# Patient Record
Sex: MALE | Race: WHITE | Employment: OTHER | URBAN - METROPOLITAN AREA
[De-identification: names, ages, dates, MRNs, and addresses within clinical notes are randomized per-mention and may not be internally consistent; named-entity substitution may affect disease eponyms.]

---

## 2023-12-08 ENCOUNTER — HOSPITAL ENCOUNTER (INPATIENT)
Facility: HOSPITAL | Age: 73
LOS: 2 days | Discharge: HOME | DRG: 194 | End: 2023-12-10
Attending: EMERGENCY MEDICINE | Admitting: INTERNAL MEDICINE
Payer: MEDICARE

## 2023-12-08 ENCOUNTER — OFFICE VISIT (OUTPATIENT)
Dept: URGENT CARE | Facility: CLINIC | Age: 73
End: 2023-12-08
Payer: MEDICARE

## 2023-12-08 ENCOUNTER — APPOINTMENT (EMERGENCY)
Dept: RADIOLOGY | Facility: HOSPITAL | Age: 73
DRG: 194 | End: 2023-12-08
Payer: MEDICARE

## 2023-12-08 VITALS
HEART RATE: 85 BPM | RESPIRATION RATE: 18 BRPM | OXYGEN SATURATION: 97 % | DIASTOLIC BLOOD PRESSURE: 59 MMHG | TEMPERATURE: 96.7 F | SYSTOLIC BLOOD PRESSURE: 80 MMHG

## 2023-12-08 DIAGNOSIS — R55 SYNCOPE: Primary | ICD-10-CM

## 2023-12-08 DIAGNOSIS — R68.89 FLU-LIKE SYMPTOMS: Primary | ICD-10-CM

## 2023-12-08 DIAGNOSIS — A41.9 SEPTIC SHOCK (HCC): ICD-10-CM

## 2023-12-08 DIAGNOSIS — E86.0 DEHYDRATION: ICD-10-CM

## 2023-12-08 DIAGNOSIS — R65.21 SEPTIC SHOCK (HCC): ICD-10-CM

## 2023-12-08 DIAGNOSIS — N17.9 AKI (ACUTE KIDNEY INJURY) (HCC): ICD-10-CM

## 2023-12-08 DIAGNOSIS — J10.1 INFLUENZA A: ICD-10-CM

## 2023-12-08 PROBLEM — J11.1 FLU: Status: ACTIVE | Noted: 2023-12-08

## 2023-12-08 LAB
2HR DELTA HS TROPONIN: -1 NG/L
ALBUMIN SERPL BCP-MCNC: 3.8 G/DL (ref 3.5–5)
ALP SERPL-CCNC: 67 U/L (ref 34–104)
ALT SERPL W P-5'-P-CCNC: 50 U/L (ref 7–52)
ANION GAP SERPL CALCULATED.3IONS-SCNC: 11 MMOL/L
AST SERPL W P-5'-P-CCNC: 85 U/L (ref 13–39)
BASOPHILS # BLD AUTO: 0.01 THOUSANDS/ÂΜL (ref 0–0.1)
BASOPHILS NFR BLD AUTO: 0 % (ref 0–1)
BILIRUB SERPL-MCNC: 0.59 MG/DL (ref 0.2–1)
BUN SERPL-MCNC: 57 MG/DL (ref 5–25)
CALCIUM SERPL-MCNC: 8.6 MG/DL (ref 8.4–10.2)
CARDIAC TROPONIN I PNL SERPL HS: 11 NG/L
CARDIAC TROPONIN I PNL SERPL HS: 12 NG/L
CHLORIDE SERPL-SCNC: 103 MMOL/L (ref 96–108)
CO2 SERPL-SCNC: 23 MMOL/L (ref 21–32)
CREAT SERPL-MCNC: 2.39 MG/DL (ref 0.6–1.3)
EOSINOPHIL # BLD AUTO: 0.02 THOUSAND/ÂΜL (ref 0–0.61)
EOSINOPHIL NFR BLD AUTO: 0 % (ref 0–6)
ERYTHROCYTE [DISTWIDTH] IN BLOOD BY AUTOMATED COUNT: 13.3 % (ref 11.6–15.1)
FLUAV RNA RESP QL NAA+PROBE: POSITIVE
FLUBV RNA RESP QL NAA+PROBE: NEGATIVE
GFR SERPL CREATININE-BSD FRML MDRD: 26 ML/MIN/1.73SQ M
GLUCOSE SERPL-MCNC: 53 MG/DL (ref 65–140)
GLUCOSE SERPL-MCNC: 69 MG/DL (ref 65–140)
HCT VFR BLD AUTO: 41.2 % (ref 36.5–49.3)
HGB BLD-MCNC: 13.7 G/DL (ref 12–17)
IMM GRANULOCYTES # BLD AUTO: 0.01 THOUSAND/UL (ref 0–0.2)
IMM GRANULOCYTES NFR BLD AUTO: 0 % (ref 0–2)
LYMPHOCYTES # BLD AUTO: 1.1 THOUSANDS/ÂΜL (ref 0.6–4.47)
LYMPHOCYTES NFR BLD AUTO: 18 % (ref 14–44)
MAGNESIUM SERPL-MCNC: 2 MG/DL (ref 1.9–2.7)
MCH RBC QN AUTO: 29.5 PG (ref 26.8–34.3)
MCHC RBC AUTO-ENTMCNC: 33.3 G/DL (ref 31.4–37.4)
MCV RBC AUTO: 89 FL (ref 82–98)
MONOCYTES # BLD AUTO: 0.69 THOUSAND/ÂΜL (ref 0.17–1.22)
MONOCYTES NFR BLD AUTO: 11 % (ref 4–12)
NEUTROPHILS # BLD AUTO: 4.21 THOUSANDS/ÂΜL (ref 1.85–7.62)
NEUTS SEG NFR BLD AUTO: 71 % (ref 43–75)
NRBC BLD AUTO-RTO: 0 /100 WBCS
PLATELET # BLD AUTO: 176 THOUSANDS/UL (ref 149–390)
PMV BLD AUTO: 10 FL (ref 8.9–12.7)
POTASSIUM SERPL-SCNC: 3.4 MMOL/L (ref 3.5–5.3)
PROT SERPL-MCNC: 7.4 G/DL (ref 6.4–8.4)
RBC # BLD AUTO: 4.64 MILLION/UL (ref 3.88–5.62)
RSV RNA RESP QL NAA+PROBE: NEGATIVE
SARS-COV-2 AG UPPER RESP QL IA: NEGATIVE
SARS-COV-2 RNA RESP QL NAA+PROBE: NEGATIVE
SL AMB POCT GLUCOSE BLD: 109
SODIUM SERPL-SCNC: 137 MMOL/L (ref 135–147)
VALID CONTROL: NORMAL
WBC # BLD AUTO: 6.04 THOUSAND/UL (ref 4.31–10.16)

## 2023-12-08 PROCEDURE — 87811 SARS-COV-2 COVID19 W/OPTIC: CPT | Performed by: FAMILY MEDICINE

## 2023-12-08 PROCEDURE — 71045 X-RAY EXAM CHEST 1 VIEW: CPT

## 2023-12-08 PROCEDURE — G1004 CDSM NDSC: HCPCS

## 2023-12-08 PROCEDURE — 36415 COLL VENOUS BLD VENIPUNCTURE: CPT | Performed by: EMERGENCY MEDICINE

## 2023-12-08 PROCEDURE — 85025 COMPLETE CBC W/AUTO DIFF WBC: CPT | Performed by: EMERGENCY MEDICINE

## 2023-12-08 PROCEDURE — 87636 SARSCOV2 & INF A&B AMP PRB: CPT | Performed by: FAMILY MEDICINE

## 2023-12-08 PROCEDURE — 70450 CT HEAD/BRAIN W/O DYE: CPT

## 2023-12-08 PROCEDURE — 96360 HYDRATION IV INFUSION INIT: CPT

## 2023-12-08 PROCEDURE — 99285 EMERGENCY DEPT VISIT HI MDM: CPT | Performed by: EMERGENCY MEDICINE

## 2023-12-08 PROCEDURE — 84484 ASSAY OF TROPONIN QUANT: CPT | Performed by: EMERGENCY MEDICINE

## 2023-12-08 PROCEDURE — 99222 1ST HOSP IP/OBS MODERATE 55: CPT | Performed by: INTERNAL MEDICINE

## 2023-12-08 PROCEDURE — 93005 ELECTROCARDIOGRAM TRACING: CPT

## 2023-12-08 PROCEDURE — 80053 COMPREHEN METABOLIC PANEL: CPT | Performed by: EMERGENCY MEDICINE

## 2023-12-08 PROCEDURE — 82948 REAGENT STRIP/BLOOD GLUCOSE: CPT

## 2023-12-08 PROCEDURE — 83735 ASSAY OF MAGNESIUM: CPT | Performed by: EMERGENCY MEDICINE

## 2023-12-08 PROCEDURE — 0241U HB NFCT DS VIR RESP RNA 4 TRGT: CPT | Performed by: EMERGENCY MEDICINE

## 2023-12-08 PROCEDURE — 99285 EMERGENCY DEPT VISIT HI MDM: CPT

## 2023-12-08 PROCEDURE — 99213 OFFICE O/P EST LOW 20 MIN: CPT | Performed by: FAMILY MEDICINE

## 2023-12-08 PROCEDURE — 82948 REAGENT STRIP/BLOOD GLUCOSE: CPT | Performed by: FAMILY MEDICINE

## 2023-12-08 RX ORDER — ONDANSETRON 2 MG/ML
4 INJECTION INTRAMUSCULAR; INTRAVENOUS ONCE
Status: COMPLETED | OUTPATIENT
Start: 2023-12-08 | End: 2023-12-08

## 2023-12-08 RX ORDER — ACETAMINOPHEN 325 MG/1
650 TABLET ORAL EVERY 6 HOURS PRN
Status: DISCONTINUED | OUTPATIENT
Start: 2023-12-08 | End: 2023-12-10 | Stop reason: HOSPADM

## 2023-12-08 RX ORDER — OSELTAMIVIR PHOSPHATE 6 MG/ML
75 FOR SUSPENSION ORAL EVERY 12 HOURS SCHEDULED
Status: DISCONTINUED | OUTPATIENT
Start: 2023-12-08 | End: 2023-12-08

## 2023-12-08 RX ORDER — HEPARIN SODIUM 5000 [USP'U]/ML
5000 INJECTION, SOLUTION INTRAVENOUS; SUBCUTANEOUS EVERY 8 HOURS SCHEDULED
Status: DISCONTINUED | OUTPATIENT
Start: 2023-12-08 | End: 2023-12-10 | Stop reason: HOSPADM

## 2023-12-08 RX ORDER — OSELTAMIVIR PHOSPHATE 30 MG/1
30 CAPSULE ORAL EVERY 12 HOURS SCHEDULED
Status: DISCONTINUED | OUTPATIENT
Start: 2023-12-08 | End: 2023-12-10 | Stop reason: HOSPADM

## 2023-12-08 RX ORDER — SODIUM CHLORIDE 9 MG/ML
100 INJECTION, SOLUTION INTRAVENOUS CONTINUOUS
Status: DISCONTINUED | OUTPATIENT
Start: 2023-12-08 | End: 2023-12-09

## 2023-12-08 RX ORDER — MAGNESIUM HYDROXIDE/ALUMINUM HYDROXICE/SIMETHICONE 120; 1200; 1200 MG/30ML; MG/30ML; MG/30ML
30 SUSPENSION ORAL EVERY 6 HOURS PRN
Status: DISCONTINUED | OUTPATIENT
Start: 2023-12-08 | End: 2023-12-10 | Stop reason: HOSPADM

## 2023-12-08 RX ORDER — ONDANSETRON 2 MG/ML
4 INJECTION INTRAMUSCULAR; INTRAVENOUS EVERY 6 HOURS PRN
Status: DISCONTINUED | OUTPATIENT
Start: 2023-12-08 | End: 2023-12-10 | Stop reason: HOSPADM

## 2023-12-08 RX ADMIN — ONDANSETRON 4 MG: 2 INJECTION INTRAMUSCULAR; INTRAVENOUS at 20:45

## 2023-12-08 RX ADMIN — OSELTAMIVIR PHOSPHATE 30 MG: 30 CAPSULE ORAL at 21:56

## 2023-12-08 RX ADMIN — SODIUM CHLORIDE 75 ML/HR: 0.9 INJECTION, SOLUTION INTRAVENOUS at 23:49

## 2023-12-08 RX ADMIN — HEPARIN SODIUM 5000 UNITS: 5000 INJECTION INTRAVENOUS; SUBCUTANEOUS at 23:48

## 2023-12-08 RX ADMIN — SODIUM CHLORIDE 1000 ML: 0.9 INJECTION, SOLUTION INTRAVENOUS at 18:38

## 2023-12-08 NOTE — PROGRESS NOTES
[facility]        NAME: Cony Lopez is a 68 y.o. male  : 1950    MRN: [id number]  DATE: 2023  TIME: 6:47 PM    Assessment and Plan   Flu-like symptoms [R68.89]  1. Flu-like symptoms  Covid/Flu-Office Collect    Poct Covid 19 Rapid Antigen Test      2. Septic shock (HCC)  POCT blood glucose    Transfer to other facility        Likely experiencing pneumonia versus viral infection such as COVID or influenza which is currently affecting his mentation; very fatigued with lapsing consciousness. Had a syncopal episode in the office during his evaluation. As such, will transfer to the ED via EMS for further evaluation and management. Blood sugar 109 in the setting of DM2. BP check prior to transfer was 80/59. Patient Instructions     Follow up with PCP in 3-5 days. Proceed to  ER if symptoms worsen. Chief Complaint     Chief Complaint   Patient presents with    Cold Like Symptoms     Patient reports flu like symptoms that began Monday. Has been having fevers with severe fatigue and body aches. Also reports some nausea with diarrhea. History of Present Illness     60-year-old male with DM2 presents today with about 4 days of flulike symptoms including fatigue, chills, generalized myalgias, dizziness, headaches, nausea and a dry cough. Denies any nasal symptoms, chest pain, dyspnea or abdominal pain. His wife was diagnosed with acute bronchitis prior to the onset of his symptoms. Review of Systems   Review of Systems   Constitutional:  Positive for chills and fatigue. Negative for fever. HENT:  Negative for congestion and rhinorrhea. Respiratory:  Positive for cough (dry). Negative for shortness of breath. Cardiovascular:  Negative for chest pain. Gastrointestinal:  Positive for nausea. Negative for abdominal pain. Musculoskeletal:  Positive for myalgias. Neurological:  Positive for dizziness and headaches.      Current Medications     No current facility-administered medications for this visit. No current outpatient medications on file. Facility-Administered Medications Ordered in Other Visits:     sodium chloride 0.9 % bolus 1,000 mL, 1,000 mL, Intravenous, Once, Zeina Brar DO, Last Rate: 1,000 mL/hr at 12/08/23 1838, 1,000 mL at 12/08/23 0977    Current Allergies     Allergies as of 12/08/2023    (No Known Allergies)            The following portions of the patient's history were reviewed and updated as appropriate: allergies, current medications, past family history, past medical history, past social history, past surgical history and problem list.     Past Medical History:   Diagnosis Date    Diabetes mellitus ([address])     Hypertension        History reviewed. No pertinent surgical history. History reviewed. No pertinent family history. Medications have been verified. Objective   BP (!) 80/59   Pulse 85   Temp (!) 96.7 °F (35.9 °C)   Resp 18   SpO2 97%   No LMP for male patient. Physical Exam     Physical Exam  Vitals and nursing note reviewed. Constitutional:       General: He is in acute distress. Appearance: He is obese. He is ill-appearing. He is not diaphoretic. Comments: Very lethargic   HENT:      Head: Normocephalic and atraumatic. Eyes:      General:         Right eye: No discharge. Left eye: No discharge. Conjunctiva/sclera: Conjunctivae normal.   Cardiovascular:      Rate and Rhythm: Normal rate and regular rhythm. Pulmonary:      Effort: No respiratory distress. Breath sounds: Normal breath sounds. No wheezing, rhonchi or rales. Skin:     General: Skin is warm.

## 2023-12-09 PROBLEM — J32.9 SINUSITIS: Status: ACTIVE | Noted: 2023-12-09

## 2023-12-09 PROBLEM — I10 PRIMARY HYPERTENSION: Status: ACTIVE | Noted: 2023-12-09

## 2023-12-09 PROBLEM — E11.9 DM (DIABETES MELLITUS) (HCC): Status: ACTIVE | Noted: 2023-12-09

## 2023-12-09 LAB
ALBUMIN SERPL BCP-MCNC: 3.2 G/DL (ref 3.5–5)
ALP SERPL-CCNC: 59 U/L (ref 34–104)
ALT SERPL W P-5'-P-CCNC: 37 U/L (ref 7–52)
ANION GAP SERPL CALCULATED.3IONS-SCNC: 8 MMOL/L
APTT PPP: 37 SECONDS (ref 23–37)
AST SERPL W P-5'-P-CCNC: 60 U/L (ref 13–39)
ATRIAL RATE: 81 BPM
BACTERIA UR QL AUTO: NORMAL /HPF
BASOPHILS # BLD AUTO: 0 THOUSANDS/ÂΜL (ref 0–0.1)
BASOPHILS NFR BLD AUTO: 0 % (ref 0–1)
BILIRUB SERPL-MCNC: 0.62 MG/DL (ref 0.2–1)
BILIRUB UR QL STRIP: NEGATIVE
BUN SERPL-MCNC: 51 MG/DL (ref 5–25)
CALCIUM ALBUM COR SERPL-MCNC: 8.3 MG/DL (ref 8.3–10.1)
CALCIUM SERPL-MCNC: 7.7 MG/DL (ref 8.4–10.2)
CHLORIDE SERPL-SCNC: 104 MMOL/L (ref 96–108)
CLARITY UR: CLEAR
CO2 SERPL-SCNC: 21 MMOL/L (ref 21–32)
COLOR UR: YELLOW
CREAT SERPL-MCNC: 1.78 MG/DL (ref 0.6–1.3)
EOSINOPHIL # BLD AUTO: 0.01 THOUSAND/ÂΜL (ref 0–0.61)
EOSINOPHIL NFR BLD AUTO: 0 % (ref 0–6)
ERYTHROCYTE [DISTWIDTH] IN BLOOD BY AUTOMATED COUNT: 13.2 % (ref 11.6–15.1)
FINE GRAN CASTS URNS QL MICRO: NORMAL /LPF
FLUAV RNA RESP QL NAA+PROBE: POSITIVE
FLUBV RNA RESP QL NAA+PROBE: NEGATIVE
GFR SERPL CREATININE-BSD FRML MDRD: 37 ML/MIN/1.73SQ M
GLUCOSE SERPL-MCNC: 143 MG/DL (ref 65–140)
GLUCOSE SERPL-MCNC: 146 MG/DL (ref 65–140)
GLUCOSE SERPL-MCNC: 212 MG/DL (ref 65–140)
GLUCOSE SERPL-MCNC: 218 MG/DL (ref 65–140)
GLUCOSE UR STRIP-MCNC: NEGATIVE MG/DL
HCT VFR BLD AUTO: 34.2 % (ref 36.5–49.3)
HGB BLD-MCNC: 11.6 G/DL (ref 12–17)
HGB UR QL STRIP.AUTO: NEGATIVE
IMM GRANULOCYTES # BLD AUTO: 0.01 THOUSAND/UL (ref 0–0.2)
IMM GRANULOCYTES NFR BLD AUTO: 0 % (ref 0–2)
INR PPP: 1.06 (ref 0.84–1.19)
KETONES UR STRIP-MCNC: NEGATIVE MG/DL
LEUKOCYTE ESTERASE UR QL STRIP: NEGATIVE
LIPASE SERPL-CCNC: 35 U/L (ref 11–82)
LYMPHOCYTES # BLD AUTO: 1.28 THOUSANDS/ÂΜL (ref 0.6–4.47)
LYMPHOCYTES NFR BLD AUTO: 24 % (ref 14–44)
MAGNESIUM SERPL-MCNC: 1.8 MG/DL (ref 1.9–2.7)
MCH RBC QN AUTO: 29.4 PG (ref 26.8–34.3)
MCHC RBC AUTO-ENTMCNC: 33.9 G/DL (ref 31.4–37.4)
MCV RBC AUTO: 87 FL (ref 82–98)
MONOCYTES # BLD AUTO: 0.57 THOUSAND/ÂΜL (ref 0.17–1.22)
MONOCYTES NFR BLD AUTO: 11 % (ref 4–12)
NEUTROPHILS # BLD AUTO: 3.48 THOUSANDS/ÂΜL (ref 1.85–7.62)
NEUTS SEG NFR BLD AUTO: 65 % (ref 43–75)
NITRITE UR QL STRIP: NEGATIVE
NON-SQ EPI CELLS URNS QL MICRO: NORMAL /HPF
NRBC BLD AUTO-RTO: 0 /100 WBCS
P AXIS: -5 DEGREES
PH UR STRIP.AUTO: 5 [PH]
PHOSPHATE SERPL-MCNC: 3 MG/DL (ref 2.3–4.1)
PLATELET # BLD AUTO: 152 THOUSANDS/UL (ref 149–390)
PMV BLD AUTO: 9.4 FL (ref 8.9–12.7)
POTASSIUM SERPL-SCNC: 3.7 MMOL/L (ref 3.5–5.3)
PR INTERVAL: 168 MS
PROT SERPL-MCNC: 5.9 G/DL (ref 6.4–8.4)
PROT UR STRIP-MCNC: ABNORMAL MG/DL
PROTHROMBIN TIME: 14.1 SECONDS (ref 11.6–14.5)
QRS AXIS: -56 DEGREES
QRSD INTERVAL: 88 MS
QT INTERVAL: 360 MS
QTC INTERVAL: 418 MS
RBC # BLD AUTO: 3.94 MILLION/UL (ref 3.88–5.62)
RBC #/AREA URNS AUTO: NORMAL /HPF
SARS-COV-2 RNA RESP QL NAA+PROBE: NEGATIVE
SODIUM SERPL-SCNC: 133 MMOL/L (ref 135–147)
SP GR UR STRIP.AUTO: 1.02 (ref 1–1.03)
T WAVE AXIS: 11 DEGREES
UROBILINOGEN UR QL STRIP.AUTO: 0.2 E.U./DL
VENTRICULAR RATE: 81 BPM
WBC # BLD AUTO: 5.35 THOUSAND/UL (ref 4.31–10.16)
WBC #/AREA URNS AUTO: NORMAL /HPF

## 2023-12-09 PROCEDURE — 81001 URINALYSIS AUTO W/SCOPE: CPT | Performed by: INTERNAL MEDICINE

## 2023-12-09 PROCEDURE — NC001 PR NO CHARGE: Performed by: INTERNAL MEDICINE

## 2023-12-09 PROCEDURE — 83735 ASSAY OF MAGNESIUM: CPT | Performed by: INTERNAL MEDICINE

## 2023-12-09 PROCEDURE — 83690 ASSAY OF LIPASE: CPT | Performed by: INTERNAL MEDICINE

## 2023-12-09 PROCEDURE — 99223 1ST HOSP IP/OBS HIGH 75: CPT | Performed by: INTERNAL MEDICINE

## 2023-12-09 PROCEDURE — 85610 PROTHROMBIN TIME: CPT | Performed by: INTERNAL MEDICINE

## 2023-12-09 PROCEDURE — 85025 COMPLETE CBC W/AUTO DIFF WBC: CPT | Performed by: INTERNAL MEDICINE

## 2023-12-09 PROCEDURE — 82948 REAGENT STRIP/BLOOD GLUCOSE: CPT

## 2023-12-09 PROCEDURE — 85730 THROMBOPLASTIN TIME PARTIAL: CPT | Performed by: INTERNAL MEDICINE

## 2023-12-09 PROCEDURE — 99232 SBSQ HOSP IP/OBS MODERATE 35: CPT

## 2023-12-09 PROCEDURE — 80053 COMPREHEN METABOLIC PANEL: CPT | Performed by: INTERNAL MEDICINE

## 2023-12-09 PROCEDURE — 84100 ASSAY OF PHOSPHORUS: CPT | Performed by: INTERNAL MEDICINE

## 2023-12-09 RX ORDER — POTASSIUM CHLORIDE 20 MEQ/1
20 TABLET, EXTENDED RELEASE ORAL ONCE
Status: COMPLETED | OUTPATIENT
Start: 2023-12-09 | End: 2023-12-09

## 2023-12-09 RX ORDER — INSULIN LISPRO 100 [IU]/ML
1-5 INJECTION, SOLUTION INTRAVENOUS; SUBCUTANEOUS
Status: DISCONTINUED | OUTPATIENT
Start: 2023-12-09 | End: 2023-12-10 | Stop reason: HOSPADM

## 2023-12-09 RX ORDER — INSULIN LISPRO 100 [IU]/ML
1-6 INJECTION, SOLUTION INTRAVENOUS; SUBCUTANEOUS
Status: DISCONTINUED | OUTPATIENT
Start: 2023-12-09 | End: 2023-12-10 | Stop reason: HOSPADM

## 2023-12-09 RX ORDER — LANOLIN ALCOHOL/MO/W.PET/CERES
400 CREAM (GRAM) TOPICAL 2 TIMES DAILY
Status: DISCONTINUED | OUTPATIENT
Start: 2023-12-09 | End: 2023-12-10 | Stop reason: HOSPADM

## 2023-12-09 RX ORDER — GUAIFENESIN 600 MG/1
600 TABLET, EXTENDED RELEASE ORAL EVERY 12 HOURS SCHEDULED
Status: DISCONTINUED | OUTPATIENT
Start: 2023-12-09 | End: 2023-12-10 | Stop reason: HOSPADM

## 2023-12-09 RX ORDER — SODIUM CHLORIDE, SODIUM GLUCONATE, SODIUM ACETATE, POTASSIUM CHLORIDE, MAGNESIUM CHLORIDE, SODIUM PHOSPHATE, DIBASIC, AND POTASSIUM PHOSPHATE .53; .5; .37; .037; .03; .012; .00082 G/100ML; G/100ML; G/100ML; G/100ML; G/100ML; G/100ML; G/100ML
100 INJECTION, SOLUTION INTRAVENOUS CONTINUOUS
Status: DISCONTINUED | OUTPATIENT
Start: 2023-12-09 | End: 2023-12-10

## 2023-12-09 RX ADMIN — OSELTAMIVIR PHOSPHATE 30 MG: 30 CAPSULE ORAL at 09:04

## 2023-12-09 RX ADMIN — HEPARIN SODIUM 5000 UNITS: 5000 INJECTION INTRAVENOUS; SUBCUTANEOUS at 14:10

## 2023-12-09 RX ADMIN — POTASSIUM CHLORIDE 20 MEQ: 1500 TABLET, EXTENDED RELEASE ORAL at 06:45

## 2023-12-09 RX ADMIN — GUAIFENESIN 600 MG: 600 TABLET, EXTENDED RELEASE ORAL at 11:02

## 2023-12-09 RX ADMIN — INSULIN LISPRO 2 UNITS: 100 INJECTION, SOLUTION INTRAVENOUS; SUBCUTANEOUS at 17:28

## 2023-12-09 RX ADMIN — Medication 400 MG: at 17:34

## 2023-12-09 RX ADMIN — INSULIN LISPRO 2 UNITS: 100 INJECTION, SOLUTION INTRAVENOUS; SUBCUTANEOUS at 22:32

## 2023-12-09 RX ADMIN — HEPARIN SODIUM 5000 UNITS: 5000 INJECTION INTRAVENOUS; SUBCUTANEOUS at 22:32

## 2023-12-09 RX ADMIN — HEPARIN SODIUM 5000 UNITS: 5000 INJECTION INTRAVENOUS; SUBCUTANEOUS at 06:45

## 2023-12-09 RX ADMIN — GUAIFENESIN 600 MG: 600 TABLET, EXTENDED RELEASE ORAL at 20:26

## 2023-12-09 RX ADMIN — OSELTAMIVIR PHOSPHATE 30 MG: 30 CAPSULE ORAL at 20:26

## 2023-12-09 RX ADMIN — Medication 400 MG: at 09:45

## 2023-12-09 RX ADMIN — SODIUM CHLORIDE, SODIUM GLUCONATE, SODIUM ACETATE, POTASSIUM CHLORIDE, MAGNESIUM CHLORIDE, SODIUM PHOSPHATE, DIBASIC, AND POTASSIUM PHOSPHATE 100 ML/HR: .53; .5; .37; .037; .03; .012; .00082 INJECTION, SOLUTION INTRAVENOUS at 09:38

## 2023-12-09 NOTE — ED PROVIDER NOTES
History  Chief Complaint   Patient presents with    Syncope     Pt. Was at [facility]   to get checked for covid or flu he states been sick since Monday with chills, body aches and diarrhea. Pt. Was about to get up to go to bathroom and began feeling lightheaded and dizzy  fell backwards  on to buttocks and then said slid on wall and blacked out. Flu Symptoms     31-year-old male with past history of diabetes, hypertension, presents to the ED from a local urgent care after a syncopal episode. Patient states that he has had increasing weakness, diarrhea and intermittent nausea/vomiting over the past 5 days. Patient felt very dehydrated and weak. Patient went to a local urgent care to be tested for influenza and COVID. While waiting in the urgent care waiting room, patient started to get dizzy and when he went to get up he felt lightheaded and fell on his buttocks and then lowered himself to the floor. Patient denies hitting his head. Patient is extremely weak appearing upon arrival to the emergency department via EMS. Patient states that he has not been able to eat anything over the past 5 days and feels like he is dehydrated. History provided by:  EMS personnel and patient  Syncope  Associated symptoms: fever, nausea and vomiting    Associated symptoms: no chest pain, no palpitations, no seizures and no shortness of breath    Flu Symptoms  Presenting symptoms: diarrhea, fatigue, fever, nausea and vomiting    Presenting symptoms: no cough, no shortness of breath and no sore throat    Associated symptoms: chills    Associated symptoms: no ear pain        None       Past Medical History:   Diagnosis Date    Diabetes mellitus ([address])     Hypertension        History reviewed. No pertinent surgical history. History reviewed. No pertinent family history. I have reviewed and agree with the history as documented.     E-Cigarette/Vaping    E-Cigarette Use Never User      E-Cigarette/Vaping Substances Social History     Tobacco Use    Smoking status: Never    Smokeless tobacco: Never   Vaping Use    Vaping Use: Never used   Substance Use Topics    Alcohol use: Yes     Comment: occasionally    Drug use: Never       Review of Systems   Constitutional:  Positive for chills, fatigue and fever. HENT:  Negative for ear pain and sore throat. Eyes:  Negative for pain and visual disturbance. Respiratory:  Negative for cough and shortness of breath. Cardiovascular:  Positive for syncope. Negative for chest pain and palpitations. Gastrointestinal:  Positive for diarrhea, nausea and vomiting. Negative for abdominal pain. Genitourinary:  Negative for dysuria and hematuria. Musculoskeletal:  Negative for arthralgias and back pain. Skin:  Negative for color change and rash. Neurological:  Negative for seizures and syncope. All other systems reviewed and are negative. Physical Exam  Physical Exam  Vitals and nursing note reviewed. Constitutional:       General: He is not in acute distress. Appearance: He is well-developed. HENT:      Head: Normocephalic and atraumatic. Mouth/Throat:      Comments: Mucous membranes are very dry. Eyes:      Conjunctiva/sclera: Conjunctivae normal.   Cardiovascular:      Rate and Rhythm: Normal rate and regular rhythm. Heart sounds: No murmur heard. Pulmonary:      Effort: Pulmonary effort is normal. No respiratory distress. Breath sounds: Normal breath sounds. Abdominal:      Palpations: Abdomen is soft. Tenderness: There is no abdominal tenderness. Musculoskeletal:         General: No swelling. Cervical back: Neck supple. Skin:     General: Skin is warm and dry. Capillary Refill: Capillary refill takes less than 2 seconds. Neurological:      General: No focal deficit present. Mental Status: He is alert and oriented to person, place, and time.       Comments: Generalized weakness and ill-appearing without any focal neurodeficits. Psychiatric:         Mood and Affect: Mood normal.         Vital Signs  ED Triage Vitals [12/08/23 1757]   Temperature Pulse Respirations Blood Pressure SpO2   98.5 °F (36.9 °C) 81 18 140/83 95 %      Temp Source Heart Rate Source Patient Position - Orthostatic VS BP Location FiO2 (%)   Oral -- -- -- --      Pain Score       --           Vitals:    12/08/23 1757   BP: 140/83   Pulse: 81         Visual Acuity      ED Medications  Medications   oseltamivir (TAMIFLU) oral suspension 75 mg (has no administration in time range)   ondansetron (ZOFRAN) injection 4 mg (has no administration in time range)   sodium chloride 0.9 % bolus 1,000 mL (1,000 mL Intravenous New Bag 12/8/23 1838)       Diagnostic Studies  Results Reviewed       Procedure Component Value Units Date/Time    HS Troponin I 4hr [[id number]]     Lab Status: No result Specimen: Blood     FLU/RSV/COVID - if FLU/RSV clinically relevant [[id number]]  (Abnormal) Collected: 12/08/23 1908    Lab Status: Final result Specimen: Nares from Nose Updated: 12/08/23 1957     SARS-CoV-2 Negative     INFLUENZA A PCR Positive     INFLUENZA B PCR Negative     RSV PCR Negative    Narrative:      FOR PEDIATRIC PATIENTS - copy/paste COVID Guidelines URL to browser: [email or web address]    SARS-CoV-2 assay is a Nucleic Acid Amplification assay intended for the  qualitative detection of nucleic acid from SARS-CoV-2 in nasopharyngeal  swabs. Results are for the presumptive identification of SARS-CoV-2 RNA. Positive results are indicative of infection with SARS-CoV-2, the virus  causing COVID-19, but do not rule out bacterial infection or co-infection  with other viruses. Laboratories within the [facility] and its  territories are required to report all positive results to the appropriate  public health authorities.  Negative results do not preclude SARS-CoV-2  infection and should not be used as the sole basis for treatment or other  patient management decisions. Negative results must be combined with  clinical observations, patient history, and epidemiological information. This test has not been FDA cleared or approved. This test has been authorized by FDA under an Emergency Use Authorization  (EUA). This test is only authorized for the duration of time the  declaration that circumstances exist justifying the authorization of the  emergency use of an in vitro diagnostic tests for detection of SARS-CoV-2  virus and/or diagnosis of COVID-19 infection under section 564(b)(1) of  the Act, 21 U. S.C. 114DVF-3(R)(0), unless the authorization is terminated  or revoked sooner. The test has been validated but independent review by FDA  and CLIA is pending. Test performed using [employer]: This RT-PCR assay targets N2,  a region unique to SARS-CoV-2. A conserved region in the E-gene was chosen  for pan-Sarbecovirus detection which includes SARS-CoV-2. According to CMS-2020-01-R, this platform meets the definition of high-throughput technology.     HS Troponin I 2hr [[id number]]     Lab Status: No result Specimen: Blood     HS Troponin 0hr (reflex protocol) [[id number]]  (Normal) Collected: 12/08/23 1908    Lab Status: Final result Specimen: Blood from Arm, Right Updated: 12/08/23 1936     hs TnI 0hr 12 ng/L     Comprehensive metabolic panel [[id number]]  (Abnormal) Collected: 12/08/23 1908    Lab Status: Final result Specimen: Blood from Arm, Right Updated: 12/08/23 1930     Sodium 137 mmol/L      Potassium 3.4 mmol/L      Chloride 103 mmol/L      CO2 23 mmol/L      ANION GAP 11 mmol/L      BUN 57 mg/dL      Creatinine 2.39 mg/dL      Glucose 53 mg/dL      Calcium 8.6 mg/dL      AST 85 U/L      ALT 50 U/L      Alkaline Phosphatase 67 U/L      Total Protein 7.4 g/dL      Albumin 3.8 g/dL      Total Bilirubin 0.59 mg/dL      eGFR 26 ml/min/1.73sq m     Narrative:      [facility] guidelines for Chronic Kidney Disease (CKD):     Stage 1 with normal or high GFR (GFR > 90 mL/min/1.73 square meters)    Stage 2 Mild CKD (GFR = 60-89 mL/min/1.73 square meters)    Stage 3A Moderate CKD (GFR = 45-59 mL/min/1.73 square meters)    Stage 3B Moderate CKD (GFR = 30-44 mL/min/1.73 square meters)    Stage 4 Severe CKD (GFR = 15-29 mL/min/1.73 square meters)    Stage 5 End Stage CKD (GFR <15 mL/min/1.73 square meters)  Note: GFR calculation is accurate only with a steady state creatinine    Magnesium [[id number]]  (Normal) Collected: 12/08/23 1908    Lab Status: Final result Specimen: Blood from Arm, Right Updated: 12/08/23 1930     Magnesium 2.0 mg/dL     CBC and differential [[id number]] Collected: 12/08/23 1908    Lab Status: Final result Specimen: Blood from Arm, Right Updated: 12/08/23 1915     WBC 6.04 Thousand/uL      RBC 4.64 Million/uL      Hemoglobin 13.7 g/dL      Hematocrit 41.2 %      MCV 89 fL      MCH 29.5 pg      MCHC 33.3 g/dL      RDW 13.3 %      MPV 10.0 fL      Platelets 096 Thousands/uL      nRBC 0 /100 WBCs      Neutrophils Relative 71 %      Immat GRANS % 0 %      Lymphocytes Relative 18 %      Monocytes Relative 11 %      Eosinophils Relative 0 %      Basophils Relative 0 %      Neutrophils Absolute 4.21 Thousands/µL      Immature Grans Absolute 0.01 Thousand/uL      Lymphocytes Absolute 1.10 Thousands/µL      Monocytes Absolute 0.69 Thousand/µL      Eosinophils Absolute 0.02 Thousand/µL      Basophils Absolute 0.01 Thousands/µL     UA w Reflex to Microscopic w Reflex to Culture [[id number]]     Lab Status: No result Specimen: Urine                    CT head without contrast   Final Result by Andres Martin DO (12/08 1915)      No acute intracranial abnormality. Advanced mucosal thickening within the paranasal sinuses. Poor dentition with multiple cavities and periapical lucencies.                   Workstation performed: [id number]         XR chest 1 view portable    (Results Pending)              Procedures  ECG 12 Lead Documentation Only    Date/Time: 12/8/2023 6:11 PM    Performed by: Elida Rather, DO  Authorized by: Elida Rather, DO    Indications / Diagnosis:  Syncope  ECG reviewed by me, the ED Provider: yes    Patient location:  ED  Previous ECG:     Previous ECG:  Unavailable    Comparison to cardiac monitor: Yes    Interpretation:     Interpretation: abnormal    Comments:      Sinus rhythm, rate 81, left axis deviation noted, left anterior fascicular block noted, Q waves noted in lead II, 3, aVF suggesting inferior infarct of undetermined age, no acute ST elevations noted, no previous EKG available for comparison. ED Course                               SBIRT 22yo+      Flowsheet Row Most Recent Value   Initial Alcohol Screen: US AUDIT-C     1. How often do you have a drink containing alcohol? 0 Filed at: 12/08/2023 1759   2. How many drinks containing alcohol do you have on a typical day you are drinking? 0 Filed at: 12/08/2023 1759   3a. Male UNDER 65: How often do you have five or more drinks on one occasion? 0 Filed at: 12/08/2023 1759   3b. FEMALE Any Age, or MALE 65+: How often do you have 4 or more drinks on one occassion? 0 Filed at: 12/08/2023 1759   Audit-C Score 0 Filed at: 12/08/2023 1759   KATHY: How many times in the past year have you. .. Used an illegal drug or used a prescription medication for non-medical reasons? Never Filed at: 12/08/2023 1759                      Medical Decision Making  Obtain blood work, EKG, CT head, viral swabs  Give IV fluids, antiemetics and continue to monitor patient for any worsening symptoms. Tested positive for influenza A. Blood work showed elevated BUN and creatinine suggesting dehydration and MOJGAN. Tamiflu as well as IV fluid hydration started in the ED. At this time patient will be admitted for further evaluation and management. CT head did not show any acute intracranial abnormalities.   Patient agrees with admission plans. Amount and/or Complexity of Data Reviewed  Labs: ordered. Decision-making details documented in ED Course. Radiology: ordered. Decision-making details documented in ED Course. ECG/medicine tests: ordered and independent interpretation performed. Decision-making details documented in ED Course. Risk  Prescription drug management. Decision regarding hospitalization. Disposition  Final diagnoses:   Syncope   Influenza A   Dehydration   MOJGAN (acute kidney injury) ([address])     Time reflects when diagnosis was documented in both MDM as applicable and the Disposition within this note       Time User Action Codes Description Comment    12/8/2023  8:05 PM Tl Gambino Add [R55] Syncope     12/8/2023  8:05 PM Rocky Brar Add [J10.1] Influenza A     12/8/2023  8:05 PM Tl Gambino Add [E86.0] Dehydration     12/8/2023  8:06 PM Rocky Brar Add [N17.9] MOJGAN (acute kidney injury) [facility])           ED Disposition       ED Disposition   Admit    Condition   Stable    Date/Time   Fri Dec 8, 2023 2005    Comment   Case was discussed with Dr. Nicholas Castellon and the patient's admission status was agreed to be Admission Status: inpatient status to the service of Dr. Nicholas Castellon . Follow-up Information    None         Patient's Medications    No medications on file       No discharge procedures on file.     PDMP Review       None            ED Provider  Electronically Signed by             Tl Gambino DO  12/08/23 2032

## 2023-12-09 NOTE — CONSULTS
[address] 68 y.o. male MRN: [id number]  Unit/Bed#: [address] [place] [address] Encounter: [id number]    ASSESSMENT and PLAN:  Acute kidney injury (POA):  Unclear baseline creatinine. Creatinine was 1.19 back in November 2017. Admission creatinine of 2.39 on 12/8/23  Given history of poor oral intake, nausea, vomiting, and diarrhea in the setting of valsartan and HCTZ use, I suspect the etiology is prerenal azotemia complicated by autoregulatory failure. He does have a intermittent history of heavy NSAID use. His urinalysis is bland but does have fine granular casts suggestive of possible ATN. Continue IV fluids but change to Isolyte at 100 cc/h. Trend creatinine. If no improvement, will need to check renal ultrasound. Will need to obtain records from Dr. Mirta Moss regarding baseline creatinine. Hypertension:  BP is acceptable  Home medications: Valsartan 320 mg once a day, Hydrochlorothiazide 25 mg once a day  Continue to hold valsartan and HCTZ. Monitor BP. Hypokalemia:  K 3.4 on admission. Received potassium replacement. Follow-up labs today. Syncope: Likely volume depletion. Influenza A: per SLIM. Diabetes mellitus: per SLIM. SUMMARY OF RECOMMENDATIONS:  Change IVF to Isolyte at 100 cc/h. Follow-up labs today. Avoid NSAIDs. If no improvement in renal function, will need to check renal ultrasound. Will need to obtain records from Dr. Mirta Moss regarding baseline creatinine. The highlighted and/or bolded points in my assessment, plan, and disposition were discussed with [facility] and they agree with those points and the plan. Previous records were personally reviewed by me to obtain a baseline creatinine.    The images (CXR) were personally reviewed by me in PACS    HISTORY OF PRESENT ILLNESS:  Requesting Physician: Erum Benitez MD  Reason for Consult: MOJGAN Escobar is a 68 y.o. male who was [facility] to [facility] on 12/8/23 after presenting with a syncopal episose at urgent care. A renal consultation is requested today for assistance in the management of MOJGAN. Marty Isaac has a history of hypertension, diabetes mellitus, hyperlipidemia. He is unaware of a history of chronic kidney disease. Unfortunately, there is a paucity of labs in the [facility]. Ko's system as well is in [facility]. From what I can gather, his creatinine was 1.19 back in November 2017. He reports that he follows with Dr. Chantelle Gee in [place], [place] and had labs done through him about 6 months ago. He now presents to [address] [place] [address] after experiencing a syncopal episode while at urgent care. He reports that he started having symptoms of cough, congestion, and flulike symptoms roughly about a week ago. His symptoms were associated with nausea, vomiting, chills, and diarrhea. He reports his oral intake has been poor over the past week. Due to worsening symptoms, he went to urgent care and experienced a syncopal episode while he was there. As such, he was sent to the hospital and was subsequently admitted. He ruled in for influenza A. On admission, his creatinine was noted to be 2.39 and he was started on intravenous fluids. We are now being consulted for assistance with management of MOJGAN. He reports that he intermittently takes ibuprofen 200 mg 4 tablets a day if he has pain. He reports that he has not done this over the past 2 weeks. His outpatient med list includes the following:  Metformin 1000 mg twice a day  Valsartan 320 mg once a day  Hydrochlorothiazide 25 mg once a day  Glipizide 10 mg twice a day  Gabapentin 300 mg twice a day  Atorvastatin 80 mg once a day  Zolpidem 10 mg as needed  Lantus 80 units twice a day  NovoLog 10 units with meals  Mounjaro. He denied any chest pain, fever, abdominal pain.     PAST MEDICAL HISTORY:  Past Medical History:   Diagnosis Date    Diabetes mellitus ([address])     Hypertension      PAST SURGICAL HISTORY:  History reviewed. No pertinent surgical history. ALLERGIES:  No Known Allergies    SOCIAL HISTORY:  Social History     Substance and Sexual Activity   Alcohol Use Yes    Comment: occasionally     Social History     Substance and Sexual Activity   Drug Use Never     Social History     Tobacco Use   Smoking Status Never   Smokeless Tobacco Never     FAMILY HISTORY:  History reviewed. No pertinent family history. MEDICATIONS:    Current Facility-Administered Medications:     acetaminophen (TYLENOL) tablet 650 mg, 650 mg, Oral, Q6H PRN, Nany Callejas MD    aluminum-magnesium hydroxide-simethicone (MAALOX) oral suspension 30 mL, 30 mL, Oral, Q6H PRN, Nany Callejas MD    heparin (porcine) subcutaneous injection 5,000 Units, 5,000 Units, Subcutaneous, Q8H [address], Nany Callejas MD, 5,000 Units at 12/09/23 0645    magnesium hydroxide (MILK OF MAGNESIA) oral suspension 30 mL, 30 mL, Oral, Daily PRN, Nany Callejas MD    ondansetron [facility]) injection 4 mg, 4 mg, Intravenous, Q6H PRN, Nany Callejas MD    oseltamivir (TAMIFLU) capsule 30 mg, 30 mg, Oral, Q12H [address], Nany Callejas MD, 30 mg at 12/08/23 2156    sodium chloride 0.9 % infusion, 100 mL/hr, Intravenous, Continuous, Nany Callejas MD, Last Rate: 100 mL/hr at 12/09/23 0645, 100 mL/hr at 12/09/23 0645    REVIEW OF SYSTEMS:  All the systems were reviewed and were negative except as documented on the HPI.     PHYSICAL EXAM:  Current Weight: Weight - Scale: 129 kg (284 lb 6.3 oz)  First Weight: Weight - Scale: 129 kg (285 lb)  Vitals:    12/08/23 2100 12/08/23 2200 12/08/23 2257 12/09/23 0333   BP: 125/84  (!) 108/48 142/94   BP Location: Right arm      Pulse: 76 74 77 74   Resp: 20   18   Temp:  98.1 °F (36.7 °C) 97.9 °F (36.6 °C) 98.3 °F (36.8 °C)   TempSrc:  Oral     SpO2: 92% 95% 94% 93%   Weight:  129 kg (284 lb 6.3 oz)     Height:  6' 1" (1.854 m)         Intake/Output Summary (Last 24 hours) at 12/9/2023 0814  Last data filed at 12/9/2023 0401  Gross per 24 hour Intake --   Output 300 ml   Net -300 ml     Physical Exam  General: conscious, coherent, cooperative, not in distress. Skin: warm, dry, good turgor. Eyes: pink conjunctivae, no scleral icterus. ENT: moist lips and mucous membranes. Respiratory: equal chest expansion, clear breath sounds. Cardiovascular: distinct heart sounds, normal rate, regular rhythm, no rub  Abdomen: soft, non-tender, non-distended, normoactive bowel sounds  Extremities: no edema. Genitourinary: no rachel catheter. Neuro: awake, alert, oriented to time, place and person. Psych: appropriate affect. Lab Results:   Results from last 7 days   Lab Units 12/08/23  1908   WBC Thousand/uL 6.04   HEMOGLOBIN g/dL 13.7   HEMATOCRIT % 41.2   PLATELETS Thousands/uL 176   POTASSIUM mmol/L 3.4*   CHLORIDE mmol/L 103   CO2 mmol/L 23   BUN mg/dL 57*   CREATININE mg/dL 2.39*   CALCIUM mg/dL 8.6   MAGNESIUM mg/dL 2.0   ALK PHOS U/L 67   ALT U/L 50   AST U/L 85*     Other Studies:   Chest x-ray personally reviewed by me showed no vascular congestion.

## 2023-12-09 NOTE — ASSESSMENT & PLAN NOTE
- monitor vitals - no anti hypertensive needed as patient dehydration   - no hypotension noted since admission Patient

## 2023-12-09 NOTE — ASSESSMENT & PLAN NOTE
- likely pre renal - not hypotensive - denies NSAID use   - cr 2.39 and potassium 3.4    - cw IV hydration as ordered and replace potassium     - Intensive monitoring of renal function, including BUN, creatinine, and electrolytes. - Consider nephrology consultation if no renal function improvement post-hydration.

## 2023-12-09 NOTE — PLAN OF CARE
Problem: PAIN - ADULT  Goal: Verbalizes/displays adequate comfort level or baseline comfort level  Description: Interventions:  - Encourage patient to monitor pain and request assistance  - Assess pain using appropriate pain scale  - Administer analgesics based on type and severity of pain and evaluate response  - Implement non-pharmacological measures as appropriate and evaluate response  - Consider cultural and social influences on pain and pain management  - Notify physician/advanced practitioner if interventions unsuccessful or patient reports new pain  Outcome: Progressing     Problem: INFECTION - ADULT  Goal: Absence or prevention of progression during hospitalization  Description: INTERVENTIONS:  - Assess and monitor for signs and symptoms of infection  - Monitor lab/diagnostic results  - Monitor all insertion sites, i.e. indwelling lines, tubes, and drains  - Monitor endotracheal if appropriate and nasal secretions for changes in amount and color  - [place] appropriate cooling/warming therapies per order  - Administer medications as ordered  - Instruct and encourage patient and family to use good hand hygiene technique  - Identify and instruct in appropriate isolation precautions for identified infection/condition  Outcome: Progressing  Goal: Absence of fever/infection during neutropenic period  Description: INTERVENTIONS:  - Monitor WBC    Outcome: Progressing     Problem: SAFETY ADULT  Goal: Patient will remain free of falls  Description: INTERVENTIONS:  - Educate patient/family on patient safety including physical limitations  - Instruct patient to call for assistance with activity   - Consult OT/PT to assist with strengthening/mobility   - Keep Call bell within reach  - Keep bed low and locked with side rails adjusted as appropriate  - Keep care items and personal belongings within reach  - Initiate and maintain comfort rounds  - Make Fall Risk Sign visible to staff  - Offer Toileting every 2 Hours, in advance of need  - Initiate/Maintain bed alarm  - Obtain necessary fall risk management equipment: call bell  - Apply yellow socks and bracelet for high fall risk patients  - Consider moving patient to room near nurses station  Outcome: Progressing  Goal: Maintain or return to baseline ADL function  Description: INTERVENTIONS:  -  Assess patient's ability to carry out ADLs; assess patient's baseline for ADL function and identify physical deficits which impact ability to perform ADLs (bathing, care of mouth/teeth, toileting, grooming, dressing, etc.)  - Assess/evaluate cause of self-care deficits   - Assess range of motion  - Assess patient's mobility; develop plan if impaired  - Assess patient's need for assistive devices and provide as appropriate  - Encourage maximum independence but intervene and supervise when necessary  - Involve family in performance of ADLs  - Assess for home care needs following discharge   - Consider OT consult to assist with ADL evaluation and planning for discharge  - Provide patient education as appropriate  Outcome: Progressing  Goal: Maintains/Returns to pre admission functional level  Description: INTERVENTIONS:  - Perform AM-PAC 6 Click Basic Mobility/ Daily Activity assessment daily.  - Set and communicate daily mobility goal to care team and patient/family/caregiver. - Collaborate with rehabilitation services on mobility goals if consulted  - Perform Range of Motion 2 times a day. - Reposition patient every 2 hours.   - Dangle patient 2 times a day  - Stand patient 2 times a day  - Ambulate patient 2 times a day  - Out of bed to chair 2 times a day   - Out of bed for meals 2 times a day  - Out of bed for toileting  - Record patient progress and toleration of activity level   Outcome: Progressing     Problem: DISCHARGE PLANNING  Goal: Discharge to home or other facility with appropriate resources  Description: INTERVENTIONS:  - Identify barriers to discharge w/patient and caregiver  - Arrange for needed discharge resources and transportation as appropriate  - Identify discharge learning needs (meds, wound care, etc.)  - Arrange for interpretive services to assist at discharge as needed  - Refer to Case Management Department for coordinating discharge planning if the patient needs post-hospital services based on physician/advanced practitioner order or complex needs related to functional status, cognitive ability, or social support system  Outcome: Progressing     Problem: Knowledge Deficit  Goal: Patient/family/caregiver demonstrates understanding of disease process, treatment plan, medications, and discharge instructions  Description: Complete learning assessment and assess knowledge base.   Interventions:  - Provide teaching at level of understanding  - Provide teaching via preferred learning methods  Outcome: Progressing

## 2023-12-09 NOTE — ASSESSMENT & PLAN NOTE
- monitor vitals - no anti hypertensive needed as patient dehydration   - no hypotension noted since admission

## 2023-12-09 NOTE — ASSESSMENT & PLAN NOTE
- likely pre renal - not hypotensive - denies NSAID use   - cr 2.39 and potassium 3.4    - Continue IV hydration with Isolyte at 100 cc/h  Hold valsartan and HCTZ  Avoid NSAIDs  - Intensive monitoring of renal function, including BUN, creatinine, and electrolytes.   Nephrology consulted, recommendations appreciated

## 2023-12-09 NOTE — ASSESSMENT & PLAN NOTE
No results found for: "HGBA1C"    Recent Labs     12/08/23 2129 12/09/23  1100   POCGLU 69 143*         Blood Sugar Average: Last 72 hrs:  (P) 106    Accu-Cheks ACHS  SSI  Carbohydrate diet  Hypoglycemic protocol

## 2023-12-09 NOTE — ASSESSMENT & PLAN NOTE
Influenza Management:     - Continue administration of Tamiflu as ordered even though it is more than 72 hours since symptoms started      - Vigilant monitoring for symptom exacerbation or development of complications.      - IV hydration , labs daily to monitor creatinine and elevated AST, supportive care with anti emetics and respiratory isolation

## 2023-12-09 NOTE — PLAN OF CARE
Problem: PAIN - ADULT  Goal: Verbalizes/displays adequate comfort level or baseline comfort level  Description: Interventions:  - Encourage patient to monitor pain and request assistance  - Assess pain using appropriate pain scale  - Administer analgesics based on type and severity of pain and evaluate response  - Implement non-pharmacological measures as appropriate and evaluate response  - Consider cultural and social influences on pain and pain management  - Notify physician/advanced practitioner if interventions unsuccessful or patient reports new pain  Outcome: Progressing     Problem: INFECTION - ADULT  Goal: Absence or prevention of progression during hospitalization  Description: INTERVENTIONS:  - Assess and monitor for signs and symptoms of infection  - Monitor lab/diagnostic results  - Monitor all insertion sites, i.e. indwelling lines, tubes, and drains  - Monitor endotracheal if appropriate and nasal secretions for changes in amount and color  - [place] appropriate cooling/warming therapies per order  - Administer medications as ordered  - Instruct and encourage patient and family to use good hand hygiene technique  - Identify and instruct in appropriate isolation precautions for identified infection/condition  Outcome: Progressing  Goal: Absence of fever/infection during neutropenic period  Description: INTERVENTIONS:  - Monitor WBC    Outcome: Progressing     Problem: SAFETY ADULT  Goal: Patient will remain free of falls  Description: INTERVENTIONS:  - Educate patient/family on patient safety including physical limitations  - Instruct patient to call for assistance with activity   - Consult OT/PT to assist with strengthening/mobility   - Keep Call bell within reach  - Keep bed low and locked with side rails adjusted as appropriate  - Keep care items and personal belongings within reach  - Initiate and maintain comfort rounds  - Make Fall Risk Sign visible to staff  - Offer Toileting every  Hours, in advance of need  - Initiate/Maintain alarm  - Obtain necessary fall risk management equipment:   - Apply yellow socks and bracelet for high fall risk patients  - Consider moving patient to room near nurses station  Outcome: Progressing  Goal: Maintain or return to baseline ADL function  Description: INTERVENTIONS:  -  Assess patient's ability to carry out ADLs; assess patient's baseline for ADL function and identify physical deficits which impact ability to perform ADLs (bathing, care of mouth/teeth, toileting, grooming, dressing, etc.)  - Assess/evaluate cause of self-care deficits   - Assess range of motion  - Assess patient's mobility; develop plan if impaired  - Assess patient's need for assistive devices and provide as appropriate  - Encourage maximum independence but intervene and supervise when necessary  - Involve family in performance of ADLs  - Assess for home care needs following discharge   - Consider OT consult to assist with ADL evaluation and planning for discharge  - Provide patient education as appropriate  Outcome: Progressing  Goal: Maintains/Returns to pre admission functional level  Description: INTERVENTIONS:  - Perform AM-PAC 6 Click Basic Mobility/ Daily Activity assessment daily.  - Set and communicate daily mobility goal to care team and patient/family/caregiver. - Collaborate with rehabilitation services on mobility goals if consulted  - Perform Range of Motion  times a day. - Reposition patient every  hours.   - Dangle patient  times a day  - Stand patient  times a day  - Ambulate patient  times a day  - Out of bed to chair  times a day   - Out of bed for meals  times a day  - Out of bed for toileting  - Record patient progress and toleration of activity level   Outcome: Progressing     Problem: DISCHARGE PLANNING  Goal: Discharge to home or other facility with appropriate resources  Description: INTERVENTIONS:  - Identify barriers to discharge w/patient and caregiver  - Arrange for needed discharge resources and transportation as appropriate  - Identify discharge learning needs (meds, wound care, etc.)  - Arrange for interpretive services to assist at discharge as needed  - Refer to Case Management Department for coordinating discharge planning if the patient needs post-hospital services based on physician/advanced practitioner order or complex needs related to functional status, cognitive ability, or social support system  Outcome: Progressing     Problem: Knowledge Deficit  Goal: Patient/family/caregiver demonstrates understanding of disease process, treatment plan, medications, and discharge instructions  Description: Complete learning assessment and assess knowledge base.   Interventions:  - Provide teaching at level of understanding  - Provide teaching via preferred learning methods  Outcome: Progressing

## 2023-12-09 NOTE — ASSESSMENT & PLAN NOTE
- sec to dehydration   - CT head negative   - further work up based on clinical course and recurrence of syncope

## 2023-12-09 NOTE — ASSESSMENT & PLAN NOTE
No results found for: "HGBA1C"    Recent Labs     12/08/23 2129   POCGLU 69       Blood Sugar Average: Last 72 hrs:  (P) 69    - ISS and frequent fingerstick for 24 hours due to episode of hypoglycemia

## 2023-12-09 NOTE — PROGRESS NOTES
[id number] [facility]  Progress Note  Name: Angel Christopher  MRN: [id number]  Unit/Bed#: 9250 [facility] 743 I Date of Admission: 12/8/2023   Date of Service: 12/9/2023 I Hospital Day: 1    Assessment/Plan   * Influenza A  Assessment & Plan  Influenza Management:     - Continue administration of Tamiflu as ordered even though it is more than 72 hours since symptoms started      - Vigilant monitoring for symptom exacerbation or development of complications. - IV hydration , labs daily to monitor creatinine and elevated AST, supportive care with anti emetics and respiratory isolation    MOJGAN (acute kidney injury) ([address])  Assessment & Plan   - likely pre renal - not hypotensive - denies NSAID use   - cr 2.39 and potassium 3.4    - Continue IV hydration with Isolyte at 100 cc/h  Hold valsartan and HCTZ  Avoid NSAIDs  - Intensive monitoring of renal function, including BUN, creatinine, and electrolytes. Nephrology consulted, recommendations appreciated      Dehydration  Assessment & Plan  Dehydration sec to intractable nasuea vomitng sec to flu-Management:     - Initiate hydration therapy and daily labs      - Diligent monitoring of fluid intake and output. - Periodic reassessment of hydration status. Primary hypertension  Assessment & Plan  - monitor vitals - no anti hypertensive needed as patient dehydration   - no hypotension noted since admission     DM (diabetes mellitus) ([address])  Assessment & Plan  No results found for: "HGBA1C"    Recent Labs     12/08/23 2129 12/09/23  1100   POCGLU 69 143*         Blood Sugar Average: Last 72 hrs:  (P) 106    Accu-Cheks ACHS  SSI  Carbohydrate diet  Hypoglycemic protocol    Sinusitis  Assessment & Plan  CT head incidental finding - paranasal advanced mucosal thickening.   Augmentin ordered     Syncope  Assessment & Plan  - sec to dehydration   - CT head negative   - further work up based on clinical course and recurrence of syncope               VTE Pharmacologic Prophylaxis: VTE Score: 4 Moderate Risk (Score 3-4) - Pharmacological DVT Prophylaxis Ordered: heparin. Mobility:   Basic Mobility Inpatient Raw Score: 23  JH-HLM Goal: 7: Walk 25 feet or more  JH-HLM Achieved: 6: Walk 10 steps or more  HLM Goal achieved. Continue to encourage appropriate mobility. Patient Centered Rounds: I performed bedside rounds with nursing staff today. Discussions with Specialists or Other Care Team Provider: RN nephrology    Education and Discussions with Family / Patient: Patient declined call to . Total Time Spent on Date of Encounter in care of patient: 35 mins. This time was spent on one or more of the following: performing physical exam; counseling and coordination of care; obtaining or reviewing history; documenting in the medical record; reviewing/ordering tests, medications or procedures; communicating with other healthcare professionals and discussing with patient's family/caregivers. Current Length of Stay: 1 day(s)  Current Patient Status: Inpatient   Certification Statement: The patient will continue to require additional inpatient hospital stay due to MOJGAN  Discharge Plan: Anticipate discharge in 24-48 hrs to home. Code Status: Level 1 - Full Code    Subjective:   Patient seen and examined at bedside. Reports improvement in symptoms and no overnight acute event. Denies any pain, shortness of breath, lightheadedness, dizziness, nausea or vomiting. Objective:     Vitals:   Temp (24hrs), Av °F (36.7 °C), Min:96.7 °F (35.9 °C), Max:98.5 °F (36.9 °C)    Temp:  [96.7 °F (35.9 °C)-98.5 °F (36.9 °C)] 98.2 °F (36.8 °C)  HR:  [74-85] 78  Resp:  [18-20] 18  BP: ()/(48-94) 119/56  SpO2:  [90 %-97 %] 90 %  Body mass index is 37.52 kg/m². Input and Output Summary (last 24 hours):      Intake/Output Summary (Last 24 hours) at 2023 1405  Last data filed at 2023 1001  Gross per 24 hour   Intake --   Output 600 ml   Net -600 ml Physical Exam:   Physical Exam  Vitals and nursing note reviewed. Constitutional:       General: He is not in acute distress. Appearance: He is well-developed. He is obese. HENT:      Head: Normocephalic. Nose: No congestion. Eyes:      Conjunctiva/sclera: Conjunctivae normal.   Cardiovascular:      Rate and Rhythm: Normal rate and regular rhythm. Heart sounds: No murmur heard. Pulmonary:      Effort: Pulmonary effort is normal. No respiratory distress. Abdominal:      General: Bowel sounds are normal.      Palpations: Abdomen is soft. Tenderness: There is no abdominal tenderness. Musculoskeletal:         General: Normal range of motion. Cervical back: Normal range of motion and neck supple. Skin:     General: Skin is warm and dry. Capillary Refill: Capillary refill takes less than 2 seconds. Neurological:      Mental Status: He is alert and oriented to person, place, and time.    Psychiatric:         Mood and Affect: Mood normal.          Additional Data:     Labs:  Results from last 7 days   Lab Units 12/09/23  0841   WBC Thousand/uL 5.35   HEMOGLOBIN g/dL 11.6*   HEMATOCRIT % 34.2*   PLATELETS Thousands/uL 152   NEUTROS PCT % 65   LYMPHS PCT % 24   MONOS PCT % 11   EOS PCT % 0     Results from last 7 days   Lab Units 12/09/23  0841   SODIUM mmol/L 133*   POTASSIUM mmol/L 3.7   CHLORIDE mmol/L 104   CO2 mmol/L 21   BUN mg/dL 51*   CREATININE mg/dL 1.78*   ANION GAP mmol/L 8   CALCIUM mg/dL 7.7*   ALBUMIN g/dL 3.2*   TOTAL BILIRUBIN mg/dL 0.62   ALK PHOS U/L 59   ALT U/L 37   AST U/L 60*   GLUCOSE RANDOM mg/dL 146*     Results from last 7 days   Lab Units 12/09/23  0841   INR  1.06     Results from last 7 days   Lab Units 12/09/23  1100 12/08/23  2129   POC GLUCOSE mg/dl 143* 69               Lines/Drains:  Invasive Devices       Peripheral Intravenous Line  Duration             Peripheral IV 12/08/23 Right;Upper;Ventral (anterior) Arm <1 day Imaging: No pertinent imaging reviewed. Recent Cultures (last 7 days):         Last 24 Hours Medication List:   Current Facility-Administered Medications   Medication Dose Route Frequency Provider Last Rate    acetaminophen  650 mg Oral Q6H PRN Jarvis Mistry MD      aluminum-magnesium hydroxide-simethicone  30 mL Oral Q6H PRN Jarvis Mistry MD      guaiFENesin  600 mg Oral Q12H [address] LISA Messer      heparin (porcine)  5,000 Units Subcutaneous [facility] Jarvis Mistry MD      insulin lispro  1-5 Units Subcutaneous HS LISA Hays      insulin lispro  1-6 Units Subcutaneous TID AC LISA Hays      magnesium hydroxide  30 mL Oral Daily PRN Jarvis Mistry MD      magnesium Oxide  400 mg Oral BID LISA Messer      multi-electrolyte  100 mL/hr Intravenous Continuous Yesica MD Narinder 100 mL/hr (12/09/23 8255)    ondansetron  4 mg Intravenous Q6H PRN Jarvis Mistry MD      oseltamivir  30 mg Oral Q12H Bonnie Long MD          Today, Patient Was Seen By: LISA Messer    **Please Note: This note may have been constructed using a voice recognition system. **

## 2023-12-09 NOTE — ASSESSMENT & PLAN NOTE
Dehydration sec to intractable nasuea vomitng sec to flu-Management:     - Initiate hydration therapy and daily labs      - Diligent monitoring of fluid intake and output. - Periodic reassessment of hydration status.

## 2023-12-09 NOTE — H&P
[id number] [facility]  H&P  Name: David Looney 68 y.o. male I MRN: [id number]  Unit/Bed#: [facility] [place] I Date of Admission: 12/8/2023   Date of Service: 12/9/2023 I Hospital Day: 1      Assessment/Plan   * Influenza A  Assessment & Plan  Influenza Management:     - Continue administration of Tamiflu as ordered even though it is more than 72 hours since symptoms started      - Vigilant monitoring for symptom exacerbation or development of complications. - IV hydration , labs daily to monitor creatinine and elevated AST, supportive care with anti emetics and respiratory isolation    Dehydration  Assessment & Plan  Dehydration sec to intractable nasuea vomitng sec to flu-Management:     - Initiate hydration therapy and daily labs      - Diligent monitoring of fluid intake and output. - Periodic reassessment of hydration status. MOJGAN (acute kidney injury) ([address])  Assessment & Plan   - likely pre renal - not hypotensive - denies NSAID use   - cr 2.39 and potassium 3.4    - cw IV hydration as ordered and replace potassium     - Intensive monitoring of renal function, including BUN, creatinine, and electrolytes. - Consider nephrology consultation if no renal function improvement post-hydration. Syncope  Assessment & Plan  - sec to dehydration   - CT head negative   - further work up based on clinical course and recurrence of syncope    Primary hypertension  Assessment & Plan  - monitor vitals - no anti hypertensive needed as patient dehydration   - no hypotension noted since admission     DM (diabetes mellitus) ([address])  Assessment & Plan  No results found for: "HGBA1C"    Recent Labs     12/08/23  2129   POCGLU 69       Blood Sugar Average: Last 72 hrs:  (P) 69    - ISS and frequent fingerstick for 24 hours due to episode of hypoglycemia        Sinusitis  Assessment & Plan  CT head incidental finding - paranasal advanced mucosal thickening.   Augmentin ordered            VTE Pharmacologic Prophylaxis: VTE Score: 4 Moderate Risk (Score 3-4) - Pharmacological DVT Prophylaxis Ordered: heparin. Code Status: Level 1 - Full Code    Discussion with family: Patient declined call to . Anticipated Length of Stay: Patient will be admitted on an observation basis with an anticipated length of stay of less than 2 midnights secondary to dehydration sec to flu . Total Time Spent on Date of Encounter in care of patient: 45 mins. This time was spent on one or more of the following: performing physical exam; counseling and coordination of care; obtaining or reviewing history; documenting in the medical record; reviewing/ordering tests, medications or procedures; communicating with other healthcare professionals and discussing with patient's family/caregivers. Chief Complaint: Flu like symptoms, fatigue and syncope in urgent care    History of Present Illness:  Manjeet Dia is a 68 y.o. male     The patient, Manjeet Dia, a 36-year-old male, presented with a chief complaint of persistent nausea, vomiting, non bloody diarrhea, and flu-like symptoms for the past week. He notes that while he has had the flu previously, the severity this time is notably worse. He reports not taking flu shot this year. Shruthi Polk has a significant past medical history, including HTN, CAD, DM, Dyslipidemia, obesity - ex smoker, successful smoking cessation 40 years prior and minimal alcohol consumption. Patient went to ER and tested postiive for FLU. He had an episode of dizzines and syncope in urgent care and hence sent to ER. The episode of syncope lasted for few seconds and being attributed to dehydration and hypoglycemia as evidenced by his labs in ER and fingerstick with low BS. Patient’s last meal was last Sunday. He did not hit his head. He overall feels very fatigued. Patient is being admitted for dehydration secondary to flu and evaluation of syncope and MOJGAN.     Review of Systems:  Review of Systems Constitutional:  Positive for fatigue. Respiratory:  Positive for cough and shortness of breath. Gastrointestinal:  Positive for diarrhea, nausea and vomiting. All other systems reviewed and are negative. Past Medical and Surgical History:   Past Medical History:   Diagnosis Date    Diabetes mellitus ([address])     Hypertension        History reviewed. No pertinent surgical history. Meds/Allergies:  Prior to Admission medications    Not on File     I have reviewed home medications using recent Epic encounter. Allergies: No Known Allergies    Social History:  Marital Status: /Civil Union      Patient Pre-hospital Living Situation: Home  Patient Pre-hospital Level of Mobility: walks     Substance Use History:   Social History     Substance and Sexual Activity   Alcohol Use Yes    Comment: occasionally     Social History     Tobacco Use   Smoking Status Never   Smokeless Tobacco Never     Social History     Substance and Sexual Activity   Drug Use Never       Family History:  History reviewed. No pertinent family history. Physical Exam:     Vitals:   Blood Pressure: 142/94 (12/09/23 0333)  Pulse: 74 (12/09/23 0333)  Temperature: 98.3 °F (36.8 °C) (12/09/23 0333)  Temp Source: Oral (12/08/23 2200)  Respirations: 18 (12/09/23 0333)  Height: 6' 1" (185.4 cm) (12/08/23 2200)  Weight - Scale: 129 kg (284 lb 6.3 oz) (12/08/23 2200)  SpO2: 93 % (12/09/23 0333)    Physical Exam  Constitutional:       Appearance: Normal appearance. He is obese. HENT:      Head: Normocephalic and atraumatic. Mouth/Throat:      Mouth: Mucous membranes are moist.   Eyes:      Extraocular Movements: Extraocular movements intact. Pupils: Pupils are equal, round, and reactive to light. Cardiovascular:      Rate and Rhythm: Normal rate. Heart sounds: Normal heart sounds. Pulmonary:      Effort: Pulmonary effort is normal.      Breath sounds: Normal breath sounds.    Musculoskeletal:         General: Normal range of motion. Cervical back: Normal range of motion and neck supple. Skin:     General: Skin is warm. Neurological:      General: No focal deficit present. Mental Status: He is alert and oriented to person, place, and time. Mental status is at baseline. Additional Data:     Lab Results:  Results from last 7 days   Lab Units 12/08/23  1908   WBC Thousand/uL 6.04   HEMOGLOBIN g/dL 13.7   HEMATOCRIT % 41.2   PLATELETS Thousands/uL 176   NEUTROS PCT % 71   LYMPHS PCT % 18   MONOS PCT % 11   EOS PCT % 0     Results from last 7 days   Lab Units 12/08/23  1908   SODIUM mmol/L 137   POTASSIUM mmol/L 3.4*   CHLORIDE mmol/L 103   CO2 mmol/L 23   BUN mg/dL 57*   CREATININE mg/dL 2.39*   ANION GAP mmol/L 11   CALCIUM mg/dL 8.6   ALBUMIN g/dL 3.8   TOTAL BILIRUBIN mg/dL 0.59   ALK PHOS U/L 67   ALT U/L 50   AST U/L 85*   GLUCOSE RANDOM mg/dL 53*         Results from last 7 days   Lab Units 12/08/23  2129   POC GLUCOSE mg/dl 69               Lines/Drains:  Invasive Devices       Peripheral Intravenous Line  Duration             Peripheral IV 12/08/23 Right Hand 1 day    Peripheral IV 12/08/23 Right;Upper;Ventral (anterior) Arm <1 day                        Imaging: Reviewed radiology reports from this admission including: CT head  CT head without contrast   Final Result by Andres Paulnio DO (12/08 1915)      No acute intracranial abnormality. Advanced mucosal thickening within the paranasal sinuses. Poor dentition with multiple cavities and periapical lucencies. Workstation performed: [id number]         XR chest 1 view portable    (Results Pending)       EKG and Other Studies Reviewed on Admission:   EKG: NSR. HR 88.    ** Please Note: This note has been constructed using a voice recognition system.  **

## 2023-12-10 VITALS
DIASTOLIC BLOOD PRESSURE: 73 MMHG | HEART RATE: 79 BPM | BODY MASS INDEX: 37.69 KG/M2 | HEIGHT: 73 IN | SYSTOLIC BLOOD PRESSURE: 152 MMHG | OXYGEN SATURATION: 90 % | RESPIRATION RATE: 16 BRPM | WEIGHT: 284.39 LBS | TEMPERATURE: 97.5 F

## 2023-12-10 LAB
ANION GAP SERPL CALCULATED.3IONS-SCNC: 7 MMOL/L
BUN SERPL-MCNC: 31 MG/DL (ref 5–25)
CALCIUM SERPL-MCNC: 8.3 MG/DL (ref 8.4–10.2)
CHLORIDE SERPL-SCNC: 106 MMOL/L (ref 96–108)
CO2 SERPL-SCNC: 24 MMOL/L (ref 21–32)
CREAT SERPL-MCNC: 1.29 MG/DL (ref 0.6–1.3)
ERYTHROCYTE [DISTWIDTH] IN BLOOD BY AUTOMATED COUNT: 13.1 % (ref 11.6–15.1)
GFR SERPL CREATININE-BSD FRML MDRD: 55 ML/MIN/1.73SQ M
GLUCOSE SERPL-MCNC: 132 MG/DL (ref 65–140)
GLUCOSE SERPL-MCNC: 142 MG/DL (ref 65–140)
GLUCOSE SERPL-MCNC: 196 MG/DL (ref 65–140)
HCT VFR BLD AUTO: 36.9 % (ref 36.5–49.3)
HGB BLD-MCNC: 12.5 G/DL (ref 12–17)
MAGNESIUM SERPL-MCNC: 2 MG/DL (ref 1.9–2.7)
MCH RBC QN AUTO: 29.1 PG (ref 26.8–34.3)
MCHC RBC AUTO-ENTMCNC: 33.9 G/DL (ref 31.4–37.4)
MCV RBC AUTO: 86 FL (ref 82–98)
PLATELET # BLD AUTO: 141 THOUSANDS/UL (ref 149–390)
PMV BLD AUTO: 9.5 FL (ref 8.9–12.7)
POTASSIUM SERPL-SCNC: 4.3 MMOL/L (ref 3.5–5.3)
RBC # BLD AUTO: 4.29 MILLION/UL (ref 3.88–5.62)
SODIUM SERPL-SCNC: 137 MMOL/L (ref 135–147)
WBC # BLD AUTO: 4.39 THOUSAND/UL (ref 4.31–10.16)

## 2023-12-10 PROCEDURE — 82948 REAGENT STRIP/BLOOD GLUCOSE: CPT

## 2023-12-10 PROCEDURE — 99239 HOSP IP/OBS DSCHRG MGMT >30: CPT

## 2023-12-10 PROCEDURE — 80048 BASIC METABOLIC PNL TOTAL CA: CPT | Performed by: INTERNAL MEDICINE

## 2023-12-10 PROCEDURE — 85027 COMPLETE CBC AUTOMATED: CPT

## 2023-12-10 PROCEDURE — 99232 SBSQ HOSP IP/OBS MODERATE 35: CPT | Performed by: INTERNAL MEDICINE

## 2023-12-10 PROCEDURE — 83735 ASSAY OF MAGNESIUM: CPT

## 2023-12-10 RX ORDER — VALSARTAN 320 MG/1
320 TABLET ORAL DAILY
COMMUNITY

## 2023-12-10 RX ORDER — ATORVASTATIN CALCIUM 80 MG/1
40 TABLET, FILM COATED ORAL DAILY
COMMUNITY

## 2023-12-10 RX ORDER — INSULIN GLARGINE 100 [IU]/ML
80 INJECTION, SOLUTION SUBCUTANEOUS
COMMUNITY

## 2023-12-10 RX ORDER — GUAIFENESIN 600 MG/1
600 TABLET, EXTENDED RELEASE ORAL EVERY 12 HOURS SCHEDULED
Qty: 14 TABLET | Refills: 0 | Status: SHIPPED | OUTPATIENT
Start: 2023-12-10 | End: 2023-12-17

## 2023-12-10 RX ORDER — HYDROCHLOROTHIAZIDE 25 MG/1
25 TABLET ORAL DAILY
COMMUNITY

## 2023-12-10 RX ORDER — GLIPIZIDE 10 MG/1
10 TABLET ORAL
COMMUNITY

## 2023-12-10 RX ORDER — ZOLPIDEM TARTRATE 12.5 MG/1
10 TABLET, FILM COATED, EXTENDED RELEASE ORAL
COMMUNITY

## 2023-12-10 RX ORDER — GABAPENTIN 300 MG/1
300 CAPSULE ORAL 2 TIMES DAILY
COMMUNITY

## 2023-12-10 RX ORDER — OSELTAMIVIR PHOSPHATE 30 MG/1
30 CAPSULE ORAL EVERY 12 HOURS SCHEDULED
Qty: 6 CAPSULE | Refills: 0 | Status: SHIPPED | OUTPATIENT
Start: 2023-12-10 | End: 2023-12-13

## 2023-12-10 RX ADMIN — GUAIFENESIN 600 MG: 600 TABLET, EXTENDED RELEASE ORAL at 08:02

## 2023-12-10 RX ADMIN — SODIUM CHLORIDE, SODIUM GLUCONATE, SODIUM ACETATE, POTASSIUM CHLORIDE, MAGNESIUM CHLORIDE, SODIUM PHOSPHATE, DIBASIC, AND POTASSIUM PHOSPHATE 100 ML/HR: .53; .5; .37; .037; .03; .012; .00082 INJECTION, SOLUTION INTRAVENOUS at 02:25

## 2023-12-10 RX ADMIN — HEPARIN SODIUM 5000 UNITS: 5000 INJECTION INTRAVENOUS; SUBCUTANEOUS at 06:32

## 2023-12-10 RX ADMIN — INSULIN LISPRO 2 UNITS: 100 INJECTION, SOLUTION INTRAVENOUS; SUBCUTANEOUS at 12:57

## 2023-12-10 RX ADMIN — Medication 400 MG: at 08:02

## 2023-12-10 RX ADMIN — OSELTAMIVIR PHOSPHATE 30 MG: 30 CAPSULE ORAL at 08:43

## 2023-12-10 NOTE — ASSESSMENT & PLAN NOTE
Influenza Management:     - Continue administration of Tamiflu as ordered       - Vigilant monitoring for symptom exacerbation or development of complications.      - IV hydration , labs daily to monitor creatinine and elevated AST, supportive care with anti emetics and respiratory isolation

## 2023-12-10 NOTE — ASSESSMENT & PLAN NOTE
- likely pre renal - not hypotensive - denies NSAID use   - cr 2.39 and potassium 3.4  Renal function improved.   Creatinine 1.29 today   Discontinue IVF  Resume valsartan and HCTZ upon discharge  Avoid NSAIDs  - Nephrology consulted, recommendations appreciated

## 2023-12-10 NOTE — ASSESSMENT & PLAN NOTE
No results found for: "HGBA1C"    Recent Labs     12/09/23  1100 12/09/23  1555 12/09/23  2033 12/10/23  0711   POCGLU 143* 212* 218* 142*         Blood Sugar Average: Last 72 hrs:  (P) 156.8  Resume home blood glucose medications

## 2023-12-10 NOTE — NURSING NOTE
Jaylen Du LPN review discharge instruction with pt and wife. IV and Masimo discontinue. Pt discharge with personal belonging.

## 2023-12-10 NOTE — PLAN OF CARE
Problem: PAIN - ADULT  Goal: Verbalizes/displays adequate comfort level or baseline comfort level  Description: Interventions:  - Encourage patient to monitor pain and request assistance  - Assess pain using appropriate pain scale  - Administer analgesics based on type and severity of pain and evaluate response  - Implement non-pharmacological measures as appropriate and evaluate response  - Consider cultural and social influences on pain and pain management  - Notify physician/advanced practitioner if interventions unsuccessful or patient reports new pain  12/10/2023 0224 by Wendy Barrientos RN  Outcome: Progressing  12/10/2023 0223 by Wendy Barrientos RN  Outcome: Progressing     Problem: INFECTION - ADULT  Goal: Absence or prevention of progression during hospitalization  Description: INTERVENTIONS:  - Assess and monitor for signs and symptoms of infection  - Monitor lab/diagnostic results  - Monitor all insertion sites, i.e. indwelling lines, tubes, and drains  - Monitor endotracheal if appropriate and nasal secretions for changes in amount and color  - [place] appropriate cooling/warming therapies per order  - Administer medications as ordered  - Instruct and encourage patient and family to use good hand hygiene technique  - Identify and instruct in appropriate isolation precautions for identified infection/condition  12/10/2023 0224 by Wendy Barrientos RN  Outcome: Progressing  12/10/2023 0223 by Wendy Barrientos RN  Outcome: Progressing  Goal: Absence of fever/infection during neutropenic period  Description: INTERVENTIONS:  - Monitor WBC    12/10/2023 0224 by Wendy Barrientos RN  Outcome: Progressing  12/10/2023 0223 by eWndy Barrientos RN  Outcome: Progressing     Problem: SAFETY ADULT  Goal: Patient will remain free of falls  Description: INTERVENTIONS:  - Educate patient/family on patient safety including physical limitations  - Instruct patient to call for assistance with activity   - Consult OT/PT to assist with strengthening/mobility   - Keep Call bell within reach  - Keep bed low and locked with side rails adjusted as appropriate  - Keep care items and personal belongings within reach  - Initiate and maintain comfort rounds  - Make Fall Risk Sign visible to staff  - Offer Toileting every 2 Hours, in advance of need  - Initiate/Maintain bed alarm  - Obtain necessary fall risk management equipment: socks  - Apply yellow socks and bracelet for high fall risk patients  - Consider moving patient to room near nurses station  12/10/2023 0224 by Moira Rosa RN  Outcome: Progressing  12/10/2023 0223 by Moira Rosa RN  Outcome: Progressing  Goal: Maintain or return to baseline ADL function  Description: INTERVENTIONS:  -  Assess patient's ability to carry out ADLs; assess patient's baseline for ADL function and identify physical deficits which impact ability to perform ADLs (bathing, care of mouth/teeth, toileting, grooming, dressing, etc.)  - Assess/evaluate cause of self-care deficits   - Assess range of motion  - Assess patient's mobility; develop plan if impaired  - Assess patient's need for assistive devices and provide as appropriate  - Encourage maximum independence but intervene and supervise when necessary  - Involve family in performance of ADLs  - Assess for home care needs following discharge   - Consider OT consult to assist with ADL evaluation and planning for discharge  - Provide patient education as appropriate  12/10/2023 0224 by Moira Rosa RN  Outcome: Progressing  12/10/2023 0223 by Moira Rosa RN  Outcome: Progressing  Goal: Maintains/Returns to pre admission functional level  Description: INTERVENTIONS:  - Perform AM-PAC 6 Click Basic Mobility/ Daily Activity assessment daily.  - Set and communicate daily mobility goal to care team and patient/family/caregiver.    - Collaborate with rehabilitation services on mobility goals if consulted  - Perform Range of Motion 4 times a day. - Reposition patient every 2 hours. - Dangle patient 4 times a day  - Stand patient 4 times a day  - Ambulate patient 4 times a day  - Out of bed to chair 3 times a day   - Out of bed for meals 3 times a day  - Out of bed for toileting  - Record patient progress and toleration of activity level   12/10/2023 0224 by Aayush Alcala RN  Outcome: Progressing  12/10/2023 0223 by Aayush Alcala RN  Outcome: Progressing     Problem: DISCHARGE PLANNING  Goal: Discharge to home or other facility with appropriate resources  Description: INTERVENTIONS:  - Identify barriers to discharge w/patient and caregiver  - Arrange for needed discharge resources and transportation as appropriate  - Identify discharge learning needs (meds, wound care, etc.)  - Arrange for interpretive services to assist at discharge as needed  - Refer to Case Management Department for coordinating discharge planning if the patient needs post-hospital services based on physician/advanced practitioner order or complex needs related to functional status, cognitive ability, or social support system  12/10/2023 0224 by Aayush Alcala RN  Outcome: Progressing  12/10/2023 0223 by Aayush Alcala RN  Outcome: Progressing     Problem: Knowledge Deficit  Goal: Patient/family/caregiver demonstrates understanding of disease process, treatment plan, medications, and discharge instructions  Description: Complete learning assessment and assess knowledge base.   Interventions:  - Provide teaching at level of understanding  - Provide teaching via preferred learning methods  12/10/2023 0224 by Aayush Alcala RN  Outcome: Progressing  12/10/2023 0223 by Aayush Alcala RN  Outcome: Progressing

## 2023-12-10 NOTE — PLAN OF CARE
Problem: INFECTION - ADULT  Goal: Absence or prevention of progression during hospitalization  Description: INTERVENTIONS:  - Assess and monitor for signs and symptoms of infection  - Monitor lab/diagnostic results  - Monitor all insertion sites, i.e. indwelling lines, tubes, and drains  - Monitor endotracheal if appropriate and nasal secretions for changes in amount and color  - [place] appropriate cooling/warming therapies per order  - Administer medications as ordered  - Instruct and encourage patient and family to use good hand hygiene technique  - Identify and instruct in appropriate isolation precautions for identified infection/condition  Outcome: Progressing     Problem: SAFETY ADULT  Goal: Maintains/Returns to pre admission functional level  Description: INTERVENTIONS:  - Perform AM-PAC 6 Click Basic Mobility/ Daily Activity assessment daily.  - Set and communicate daily mobility goal to care team and patient/family/caregiver. - Collaborate with rehabilitation services on mobility goals if consulted  - Perform Range of Motion 4 times a day. - Reposition patient every 2 hours.   - Dangle patient 4 times a day  - Stand patient 4 times a day  - Ambulate patient 4 times a day  - Out of bed to chair 3 times a day   - Out of bed for meals 3 times a day  - Out of bed for toileting  - Record patient progress and toleration of activity level   Outcome: Progressing     Problem: DISCHARGE PLANNING  Goal: Discharge to home or other facility with appropriate resources  Description: INTERVENTIONS:  - Identify barriers to discharge w/patient and caregiver  - Arrange for needed discharge resources and transportation as appropriate  - Identify discharge learning needs (meds, wound care, etc.)  - Arrange for interpretive services to assist at discharge as needed  - Refer to Case Management Department for coordinating discharge planning if the patient needs post-hospital services based on physician/advanced practitioner order or complex needs related to functional status, cognitive ability, or social support system  Outcome: Progressing     Problem: Knowledge Deficit  Goal: Patient/family/caregiver demonstrates understanding of disease process, treatment plan, medications, and discharge instructions  Description: Complete learning assessment and assess knowledge base.   Interventions:  - Provide teaching at level of understanding  - Provide teaching via preferred learning methods  Outcome: Progressing

## 2023-12-10 NOTE — PROGRESS NOTES
[address] [facility] NOTE   Hung Martin 68 y.o. male MRN: [id number]  Unit/Bed#: [facility] [place] Encounter: [id number]  Reason for Consult: MOJGAN    ASSESSMENT and PLAN:  Acute kidney injury (POA):  Unclear baseline creatinine. Creatinine was 1.19 back in November 2017. Admission creatinine of 2.39 on 12/8/23. Etiology is prerenal azotemia complicated by autoreg failure. He does have a intermittent history of heavy NSAID use. UA is bland. Renal function improved. SCr 1.29 today. Stop IVF. Hypertension:  BP is slightly high. Home medications: Valsartan 320 mg once a day, Hydrochlorothiazide 25 mg once a day. Valsartan and HCT are on hold. Ok to restart BP meds on discharge. Hypokalemia:  K 3.4 on admission. Resolved. K 4.3. Syncope: Likely volume depletion. Influenza A: per SLIM. Diabetes mellitus: per SLIM. DISPOSITION:  Resolving MOJGAN. Stop IVF. May be discharged from renal standpoint. May restart BP medications on discharge. Check BMP 1 week after discharge and follow up with PCP. SUBJECTIVE / 24H INTERVAL HISTORY:  Feeling better. Appetite is improved. No CP or SOB. OBJECTIVE:  Current Weight: Weight - Scale: 129 kg (284 lb 6.3 oz)  Vitals:    12/09/23 0956 12/09/23 2022 12/09/23 2238 12/10/23 0812   BP: 119/56 149/72 155/76 152/73   BP Location: Right arm      Pulse: 78 78 82 79   Resp: 18 18 18 16   Temp: 98.2 °F (36.8 °C) 98.4 °F (36.9 °C) 98.6 °F (37 °C) 97.5 °F (36.4 °C)   TempSrc: Oral      SpO2: 90% 93% 94% 90%   Weight:       Height:           Intake/Output Summary (Last 24 hours) at 12/10/2023 0814  Last data filed at 12/10/2023 7151  Gross per 24 hour   Intake 1631.67 ml   Output 3055 ml   Net -1423.33 ml     General: conscious, cooperative, no distress  Skin: dry  Eyes: pink conjunctivae  ENT: moist mucous membranes  Respiratory: equal chest expansion, clear breath sounds.   Cardiovascular: distinct heart sounds, normal rate, regular rhythm, no rub  Abdomen: soft, non tender, non distended, normal bowel sounds  Extremities: no edema. Genitourinary: no rachel catheter. Neuro: awake, alert.    Psych: appropriate affect    Medications:    Current Facility-Administered Medications:     acetaminophen (TYLENOL) tablet 650 mg, 650 mg, Oral, Q6H PRN, Glen Headley MD    aluminum-magnesium hydroxide-simethicone (MAALOX) oral suspension 30 mL, 30 mL, Oral, Q6H PRN, Glen Headley MD    guaiFENesin (MUCINEX) 12 hr tablet 600 mg, 600 mg, Oral, Q12H [facility], LISA Hays, 600 mg at 12/10/23 0802    heparin (porcine) subcutaneous injection 5,000 Units, 5,000 Units, Subcutaneous, Q8H [facility], Glen Headley MD, 5,000 Units at 12/10/23 8041    insulin lispro (HumaLOG) 100 units/mL subcutaneous injection 1-5 Units, 1-5 Units, Subcutaneous, HS, LISA Hays, 2 Units at 12/09/23 2232    insulin lispro (HumaLOG) 100 units/mL subcutaneous injection 1-6 Units, 1-6 Units, Subcutaneous, TID AC, 2 Units at 12/09/23 1728 **AND** Fingerstick Glucose (POCT), , , TID AC, LISA Hays    magnesium hydroxide (MILK OF MAGNESIA) oral suspension 30 mL, 30 mL, Oral, Daily PRN, Glen Headley MD    magnesium Oxide (MAG-OX) tablet 400 mg, 400 mg, Oral, BID, LISA Hays, 400 mg at 12/10/23 0802    multi-electrolyte (PLASMALYTE-A/ISOLYTE-S PH 7.4) IV solution, 100 mL/hr, Intravenous, Continuous, Radha Sheppard MD, Last Rate: 100 mL/hr at 12/10/23 0225, 100 mL/hr at 12/10/23 0225    ondansetron (ZOFRAN) injection 4 mg, 4 mg, Intravenous, Q6H PRN, Glen Headley MD    oseltamivir (TAMIFLU) capsule 30 mg, 30 mg, Oral, Q12H [facility], Glen Headley MD, 30 mg at 12/09/23 2026    Laboratory Results:  Results from last 7 days   Lab Units 12/10/23  0618 12/09/23  0841 12/08/23  1908   WBC Thousand/uL 4.39 5.35 6.04   HEMOGLOBIN g/dL 12.5 11.6* 13.7   HEMATOCRIT % 36.9 34.2* 41.2   PLATELETS Thousands/uL 141* 152 176   POTASSIUM mmol/L 4.3 3.7 3.4*   CHLORIDE mmol/L 106 104 103   CO2 mmol/L 24 21 23   BUN mg/dL 31* 51* 57*   CREATININE mg/dL 1.29 1.78* 2.39*   CALCIUM mg/dL 8.3* 7.7* 8.6   MAGNESIUM mg/dL 2.0 1.8* 2.0   PHOSPHORUS mg/dL  --  3.0  --

## 2023-12-10 NOTE — DISCHARGE SUMMARY
[id number] [facility]  Discharge- Richi Escobar 1950, 68 y.o. male MRN: [id number]  Unit/Bed#: [address] [place] [address] Encounter: [id number]  Primary Care Provider: No primary care provider on file. Date and time admitted to hospital: 12/8/2023  5:55 PM    * Influenza A  Assessment & Plan  Influenza Management:     - Continue administration of Tamiflu as ordered       - Vigilant monitoring for symptom exacerbation or development of complications. - IV hydration , labs daily to monitor creatinine and elevated AST, supportive care with anti emetics and respiratory isolation    MOJGAN (acute kidney injury) ([address])  Assessment & Plan   - likely pre renal - not hypotensive - denies NSAID use   - cr 2.39 and potassium 3.4  Renal function improved. Creatinine 1.29 today   Discontinue IVF  Resume valsartan and HCTZ upon discharge  Avoid NSAIDs  - Nephrology consulted, recommendations appreciated      Dehydration  Assessment & Plan  Dehydration sec to intractable nasuea vomitng sec to flu-Management:     - Initiate hydration therapy and daily labs      - Diligent monitoring of fluid intake and output. - Periodic reassessment of hydration status. Primary hypertension  Assessment & Plan  - monitor vitals - no anti hypertensive needed as patient dehydration   Resume PTA BP medication    DM (diabetes mellitus) ([address])  Assessment & Plan  No results found for: "HGBA1C"    Recent Labs     12/09/23  1100 12/09/23  1555 12/09/23  2033 12/10/23  0711   POCGLU 143* 212* 218* 142*         Blood Sugar Average: Last 72 hrs:  (P) 156.8  Resume home blood glucose medications      Sinusitis  Assessment & Plan  CT head incidental finding - paranasal advanced mucosal thickening.   Augmentin ordered     Syncope  Assessment & Plan  - sec to dehydration   - CT head negative   Follow-up with PCP upon discharge        Medical Problems       Resolved Problems  Date Reviewed: 12/10/2023   None       Discharging Physician / Practitioner: LISA Jimenez  PCP: No primary care provider on file. Admission Date:   Admission Orders (From admission, onward)       Ordered        12/08/23 2008  INPATIENT ADMISSION  Once                          Discharge Date: 12/10/23    Consultations During Hospital Stay:  Nephrology    Procedures Performed:   CT head without contrast: Advanced mucosal thickening within the paranasal sinuses  Chest x-ray    Significant Findings / Test Results:   As noted above        Reason for Admission: Flulike symptoms, fatigue and syncope    Hospital Course: Silvano Gil is a 68 y.o. male patient who originally presented to the hospital on 12/8/2023 due to above. Tested positive for flu and was started on Tamiflu. MOJGAN noted on admission with creatinine of 2.39. Nephrology was consulted for management. Received IVF with renal function improving to 1.29. Also noted with potassium of 3.4 which resolved and was 4.3 prior to discharge. Patient is being discharged home in stable condition to follow-up with PCP          Condition at Discharge: stable    Discharge Day Visit / Exam:   Subjective: Offers no complaints    Vitals: Blood Pressure: 152/73 (12/10/23 0812)  Pulse: 79 (12/10/23 0812)  Temperature: 97.5 °F (36.4 °C) (12/10/23 0812)  Temp Source: Oral (12/10/23 1515)  Respirations: 16 (12/10/23 0812)  Height: 6' 1" (185.4 cm) (12/08/23 2200)  Weight - Scale: 129 kg (284 lb 6.3 oz) (12/08/23 2200)  SpO2: 90 % (12/10/23 5358)    Exam:   Physical Exam  Vitals and nursing note reviewed. Constitutional:       General: He is not in acute distress. Appearance: He is well-developed. He is obese. HENT:      Head: Normocephalic. Nose: No congestion. Eyes:      Conjunctiva/sclera: Conjunctivae normal.   Cardiovascular:      Rate and Rhythm: Normal rate and regular rhythm. Heart sounds: No murmur heard. Pulmonary:      Effort: Pulmonary effort is normal. No respiratory distress.    Abdominal: General: Bowel sounds are normal.      Palpations: Abdomen is soft. Tenderness: There is no abdominal tenderness. Musculoskeletal:         General: Normal range of motion. Cervical back: Normal range of motion and neck supple. Skin:     General: Skin is warm and dry. Capillary Refill: Capillary refill takes less than 2 seconds. Neurological:      Mental Status: He is alert and oriented to person, place, and time. Psychiatric:         Mood and Affect: Mood normal.     Discussion with Family: Patient    Discharge instructions/Information to patient and family:   See after visit summary for information provided to patient and family. Provisions for Follow-Up Care:  See after visit summary for information related to follow-up care and any pertinent home health orders. Disposition:   Home    Planned Readmission: No     Discharge Statement:  I spent 35 minutes discharging the patient. This time was spent on the day of discharge. I had direct contact with the patient on the day of discharge. Greater than 50% of the total time was spent examining patient, answering all patient questions, arranging and discussing plan of care with patient as well as directly providing post-discharge instructions. Additional time then spent on discharge activities. Discharge Medications:  See after visit summary for reconciled discharge medications provided to patient and/or family.       **Please Note: This note may have been constructed using a voice recognition system**

## 2024-01-03 LAB — HBA1C MFR BLD HPLC: 9.1 %

## 2024-02-06 PROBLEM — E86.0 DEHYDRATION: Status: RESOLVED | Noted: 2023-12-08 | Resolved: 2024-02-06

## 2024-02-07 PROBLEM — J32.9 SINUSITIS: Status: RESOLVED | Noted: 2023-12-09 | Resolved: 2024-02-07

## 2024-02-07 PROBLEM — J10.1 INFLUENZA A: Status: RESOLVED | Noted: 2023-12-08 | Resolved: 2024-02-07

## 2024-05-14 ENCOUNTER — OFFICE VISIT (OUTPATIENT)
Dept: FAMILY MEDICINE CLINIC | Facility: CLINIC | Age: 74
End: 2024-05-14
Payer: MEDICARE

## 2024-05-14 VITALS
SYSTOLIC BLOOD PRESSURE: 90 MMHG | OXYGEN SATURATION: 95 % | WEIGHT: 271.9 LBS | TEMPERATURE: 97.7 F | BODY MASS INDEX: 36.04 KG/M2 | DIASTOLIC BLOOD PRESSURE: 58 MMHG | HEART RATE: 94 BPM | HEIGHT: 73 IN | RESPIRATION RATE: 16 BRPM

## 2024-05-14 DIAGNOSIS — S60.512A ABRASION OF LEFT HAND, INITIAL ENCOUNTER: ICD-10-CM

## 2024-05-14 DIAGNOSIS — E11.9 TYPE 2 DIABETES MELLITUS WITHOUT COMPLICATION, WITH LONG-TERM CURRENT USE OF INSULIN (HCC): Primary | ICD-10-CM

## 2024-05-14 DIAGNOSIS — E87.8 ELECTROLYTE ABNORMALITY: ICD-10-CM

## 2024-05-14 DIAGNOSIS — R55 SYNCOPE, UNSPECIFIED SYNCOPE TYPE: ICD-10-CM

## 2024-05-14 DIAGNOSIS — N18.31 STAGE 3A CHRONIC KIDNEY DISEASE (HCC): ICD-10-CM

## 2024-05-14 DIAGNOSIS — Z13.29 THYROID DISORDER SCREENING: ICD-10-CM

## 2024-05-14 DIAGNOSIS — Z13.0 SCREENING, IRON DEFICIENCY ANEMIA: ICD-10-CM

## 2024-05-14 DIAGNOSIS — E66.01 OBESITY, MORBID (HCC): ICD-10-CM

## 2024-05-14 DIAGNOSIS — M54.2 CERVICAL PAIN (NECK): ICD-10-CM

## 2024-05-14 DIAGNOSIS — E78.2 MIXED HYPERLIPIDEMIA: ICD-10-CM

## 2024-05-14 DIAGNOSIS — Z79.4 TYPE 2 DIABETES MELLITUS WITHOUT COMPLICATION, WITH LONG-TERM CURRENT USE OF INSULIN (HCC): Primary | ICD-10-CM

## 2024-05-14 DIAGNOSIS — T14.8XXA SKIN ABRASION: ICD-10-CM

## 2024-05-14 DIAGNOSIS — M25.532 ACUTE PAIN OF LEFT WRIST: ICD-10-CM

## 2024-05-14 DIAGNOSIS — Z12.5 PROSTATE CANCER SCREENING: ICD-10-CM

## 2024-05-14 DIAGNOSIS — I10 PRIMARY HYPERTENSION: ICD-10-CM

## 2024-05-14 DIAGNOSIS — Z23 ENCOUNTER FOR IMMUNIZATION: ICD-10-CM

## 2024-05-14 DIAGNOSIS — D69.6 PLATELETS DECREASED (HCC): ICD-10-CM

## 2024-05-14 PROBLEM — N17.9 AKI (ACUTE KIDNEY INJURY) (HCC): Status: RESOLVED | Noted: 2023-12-08 | Resolved: 2024-05-14

## 2024-05-14 PROCEDURE — 90715 TDAP VACCINE 7 YRS/> IM: CPT | Performed by: STUDENT IN AN ORGANIZED HEALTH CARE EDUCATION/TRAINING PROGRAM

## 2024-05-14 PROCEDURE — 99204 OFFICE O/P NEW MOD 45 MIN: CPT | Performed by: STUDENT IN AN ORGANIZED HEALTH CARE EDUCATION/TRAINING PROGRAM

## 2024-05-14 PROCEDURE — 90471 IMMUNIZATION ADMIN: CPT | Performed by: STUDENT IN AN ORGANIZED HEALTH CARE EDUCATION/TRAINING PROGRAM

## 2024-05-14 RX ORDER — INSULIN ASPART 100 [IU]/ML
INJECTION, SOLUTION INTRAVENOUS; SUBCUTANEOUS
COMMUNITY

## 2024-05-14 RX ORDER — INSULIN GLARGINE 100 [IU]/ML
INJECTION, SOLUTION SUBCUTANEOUS
COMMUNITY
Start: 2024-03-30

## 2024-05-14 RX ORDER — PEN NEEDLE, DIABETIC 32GX 5/32"
NEEDLE, DISPOSABLE MISCELLANEOUS
COMMUNITY
Start: 2024-03-27

## 2024-05-14 NOTE — PROGRESS NOTES
Clinic Visit Note  Donovan Burnette 74 y.o. male   MRN: [id number]    Assessment and Plan      Diagnoses and all orders for this visit:    Type 2 diabetes mellitus without complication, with long-term current use of insulin (HCC)  Continue insulin therapy as directed, follow-up hemoglobin A1c, based on hemoglobin A1c diabetic management recommendations pending.  -     insulin aspart (NovoLOG FlexPen ReliOn) 100 UNIT/ML injection pen  -     BD Pen Needle Bernice 2nd Gen 32G X 4 MM MISC; use 1 PEN NEEDLE to inject MEDICATION subcutaneously four times a day  -     Lantus SoloStar 100 units/mL SOPN; ADMINISTER 80 UNITS UNDER THE SKIN TWICE DAILY  -     Hemoglobin A1C; Future    Encounter for immunization  -     Tdap Vaccine greater than or equal to 8yo    Skin abrasion  -     Tdap Vaccine greater than or equal to 8yo    Abrasion of left hand, initial encounter  -     Tdap Vaccine greater than or equal to 8yo    Primary hypertension  Blood pressure slightly low, continue to monitor blood pressure in the ambulatory setting, if systolic number consistently lower than 120, plan to reduce hydrochlorothiazide and possible discontinuation, following with cardiology as well.    Syncope, unspecified syncope type  Asymptomatic in office today, continue to monitor    Mixed hyperlipidemia  Continue high intensity statin therapy  -     Lipid panel; Future    Thyroid disorder screening  -     TSH, 3rd generation with Free T4 reflex; Future    Electrolyte abnormality  -     Comprehensive metabolic panel; Future    Screening, iron deficiency anemia  -     CBC and differential; Future    Prostate cancer screening  -     PSA, Total Screen; Future    Obesity, morbid (HCC)  Continue Abdelrahman, patient is currently on 10 mg weekly    Platelets decreased (HCC)  Repeat CBC    Stage 3a chronic kidney disease (HCC)  -     Comprehensive metabolic panel; Future    Cervical pain (neck)  MSK cervical trapezius hypertonicity bilaterally, recommend  physical therapy, stretching/strengthening exercises heat to area.  -     Ambulatory Referral to Physical Therapy; Future    Acute pain of left wrist  Vascular, sensation, muscle strength intact, recommend x-ray imaging to rule out acute pathology, follow-up telephone call  -     XR wrist 3+ vw left; Future    4-week follow-up Medicare annual wellness.    My impressions and treatment recommendations were discussed in detail with the patient who verbalized understanding and had no further questions.  Discharge instructions were provided.    Subjective     Chief Complaint: NP    History of Present Illness:    Patient is a pleasant 74-year-old male coming in for new patient encounter, medications reviewed, medical history reviewed, patient has been following up with cardiologist.    The following portions of the patient's history were reviewed and updated as appropriate: allergies, current medications, past family history, past medical history, past social history, past surgical history and problem list.    REVIEW OF SYSTEMS:  A complete 12-point review of systems is negative other than that noted in the HPI.    Review of Systems   Constitutional:  Negative for chills, fatigue and fever.   HENT:  Negative for congestion and sore throat.    Eyes:  Negative for pain and visual disturbance.   Respiratory:  Negative for shortness of breath and wheezing.    Cardiovascular:  Negative for chest pain and palpitations.   Gastrointestinal:  Negative for abdominal pain, constipation, diarrhea, nausea and vomiting.   Genitourinary:  Negative for dysuria and frequency.   Musculoskeletal:  Negative for back pain and neck pain.   Skin:  Negative for color change and rash.   Neurological:  Negative for dizziness and headaches.   Psychiatric/Behavioral:  Negative for agitation and confusion.    All other systems reviewed and are negative.        Current Outpatient Medications:   •  atorvastatin (LIPITOR) 80 mg tablet, Take 40 mg by  mouth daily, Disp: , Rfl:   •  BD Pen Needle Bernice 2nd Gen 32G X 4 MM MISC, use 1 PEN NEEDLE to inject MEDICATION subcutaneously four times a day, Disp: , Rfl:   •  gabapentin (NEURONTIN) 300 mg capsule, Take 300 mg by mouth 2 (two) times a day, Disp: , Rfl:   •  glipiZIDE (GLUCOTROL) 10 mg tablet, Take 10 mg by mouth 2 (two) times a day before meals, Disp: , Rfl:   •  hydrochlorothiazide (HYDRODIURIL) 25 mg tablet, Take 25 mg by mouth daily, Disp: , Rfl:   •  insulin aspart (NovoLOG FlexPen ReliOn) 100 UNIT/ML injection pen, , Disp: , Rfl:   •  Lantus SoloStar 100 units/mL SOPN, ADMINISTER 80 UNITS UNDER THE SKIN TWICE DAILY, Disp: , Rfl:   •  metFORMIN (GLUCOPHAGE) 1000 MG tablet, Take 1,000 mg by mouth 2 (two) times a day with meals, Disp: , Rfl:   •  tirzepatide (Mounjaro) 7.5 MG/0.5ML, Inject 10 mg under the skin every 7 days, Disp: , Rfl:   •  valsartan (DIOVAN) 320 MG tablet, Take 320 mg by mouth daily, Disp: , Rfl:   •  zolpidem (AMBIEN CR) 12.5 MG CR tablet, Take 10 mg by mouth daily at bedtime as needed for sleep, Disp: , Rfl:   Past Medical History:   Diagnosis Date   • Arthritis    • Diabetes mellitus (HCC)    • Hypertension    • Kidney stone      Past Surgical History:   Procedure Laterality Date   • CHOLECYSTECTOMY     • HERNIA REPAIR     • SHOULDER SURGERY Bilateral    • TONSILLECTOMY       Social History     Socioeconomic History   • Marital status: /Civil Union     Spouse name: Not on file   • Number of children: Not on file   • Years of education: Not on file   • Highest education level: Not on file   Occupational History   • Not on file   Tobacco Use   • Smoking status: Former     Types: Cigarettes     Start date: 1970   • Smokeless tobacco: Never   Vaping Use   • Vaping status: Never Used   Substance and Sexual Activity   • Alcohol use: Yes     Alcohol/week: 2.0 standard drinks of alcohol     Types: 1 Glasses of wine, 1 Cans of beer per week     Comment: occasional   • Drug use: Never   •  "Sexual activity: Not on file   Other Topics Concern   • Not on file   Social History Narrative   • Not on file     Social Determinants of Health     Financial Resource Strain: Not on file   Food Insecurity: Not on file   Transportation Needs: Not on file   Physical Activity: Not on file   Stress: Not on file   Social Connections: Not on file   Intimate Partner Violence: Not on file   Housing Stability: Not on file     Family History   Problem Relation Age of Onset   • Cancer Mother      No Known Allergies    Objective     Vitals:    05/14/24 1351   BP: 90/58   BP Location: Right arm   Patient Position: Sitting   Cuff Size: Large   Pulse: 94   Resp: 16   Temp: 97.7 °F (36.5 °C)   SpO2: 95%   Weight: 123 kg (271 lb 14.4 oz)   Height: 6' 1\" (1.854 m)       Physical Exam:     GENERAL: NAD, pleasant   HEENT:  NC/AT, PERRL, EOMI, no scleral icterus  CARDIAC:  RRR, +S1/S2, no S3/S4 appreciated, no m/g/r  PULMONARY:  CTA B/L, no wheezing/rales/rhonci, non-labored breathing  ABDOMEN:  Soft, NT/ND, no rebound/guarding/rigidity  Extremities:. No edema, cyanosis, or clubbing  Musculoskeletal:  Full range of motion intact in all extremities   NEUROLOGIC: Grossly intact, no focal deficits  SKIN:  No rashes or erythema noted on exposed skin  Psych: Normal affect, mood stable    ==  PLEASE NOTE:  This encounter was completed utilizing the [employer] Speech Voice Recognition Software. Grammatical errors, random word insertions, pronoun errors and incomplete sentences are occasional consequences of the system due to software limitations, ambient noise and hardware issues.These may be missed by proof reading prior to affixing electronic signature. Any questions or concerns about the content, text or information contained within the body of this dictation should be directly addressed to the physician for clarification. Please do not hesitate to call me directly if you have any any questions or concerns.    Luis Miguel Buchanan, "   [facility]   [facility]

## 2024-05-15 ENCOUNTER — TELEPHONE (OUTPATIENT)
Dept: ADMINISTRATIVE | Facility: OTHER | Age: 74
End: 2024-05-15

## 2024-05-15 NOTE — TELEPHONE ENCOUNTER
Upon review of the In Basket request and the patient's chart, initial outreach has been made via telephone call to facility. Please see Contacts section for details.     Thank you  Bernard Willis MA

## 2024-05-15 NOTE — TELEPHONE ENCOUNTER
----- Message from Rebeca DONAHUE sent at 5/14/2024  2:07 PM EDT -----  Regarding: care gap request  05/14/24 2:07 PM    Hello, our patient attached above has had Diabetic Foot Exam, Glomerular Filtration Rate (GFR), Hemoglobin A1c, and Urine Albumin/Creatinine Ratio completed/performed. Please assist in updating the patient chart by making an External outreach to Dr Tina Rodriguez  facility located in [facility]. The date of service is 202.    Thank you,  Rebeca RAYO

## 2024-05-16 ENCOUNTER — APPOINTMENT (OUTPATIENT)
Dept: LAB | Facility: CLINIC | Age: 74
End: 2024-05-16
Payer: MEDICARE

## 2024-05-16 ENCOUNTER — APPOINTMENT (OUTPATIENT)
Dept: RADIOLOGY | Facility: CLINIC | Age: 74
End: 2024-05-16
Payer: MEDICARE

## 2024-05-16 DIAGNOSIS — N18.31 STAGE 3A CHRONIC KIDNEY DISEASE (HCC): ICD-10-CM

## 2024-05-16 DIAGNOSIS — Z13.29 THYROID DISORDER SCREENING: ICD-10-CM

## 2024-05-16 DIAGNOSIS — E11.9 TYPE 2 DIABETES MELLITUS WITHOUT COMPLICATION, WITH LONG-TERM CURRENT USE OF INSULIN (HCC): ICD-10-CM

## 2024-05-16 DIAGNOSIS — E87.8 ELECTROLYTE ABNORMALITY: ICD-10-CM

## 2024-05-16 DIAGNOSIS — E78.2 MIXED HYPERLIPIDEMIA: ICD-10-CM

## 2024-05-16 DIAGNOSIS — M25.532 ACUTE PAIN OF LEFT WRIST: ICD-10-CM

## 2024-05-16 DIAGNOSIS — Z12.5 PROSTATE CANCER SCREENING: ICD-10-CM

## 2024-05-16 DIAGNOSIS — Z13.0 SCREENING, IRON DEFICIENCY ANEMIA: ICD-10-CM

## 2024-05-16 DIAGNOSIS — Z79.4 TYPE 2 DIABETES MELLITUS WITHOUT COMPLICATION, WITH LONG-TERM CURRENT USE OF INSULIN (HCC): ICD-10-CM

## 2024-05-16 LAB
ALBUMIN SERPL BCP-MCNC: 4.2 G/DL (ref 3.5–5)
ALP SERPL-CCNC: 80 U/L (ref 34–104)
ALT SERPL W P-5'-P-CCNC: 23 U/L (ref 7–52)
ANION GAP SERPL CALCULATED.3IONS-SCNC: 9 MMOL/L (ref 4–13)
AST SERPL W P-5'-P-CCNC: 27 U/L (ref 13–39)
BASOPHILS # BLD AUTO: 0.04 THOUSANDS/ÂΜL (ref 0–0.1)
BASOPHILS NFR BLD AUTO: 0 % (ref 0–1)
BILIRUB SERPL-MCNC: 0.58 MG/DL (ref 0.2–1)
BUN SERPL-MCNC: 20 MG/DL (ref 5–25)
CALCIUM SERPL-MCNC: 8.9 MG/DL (ref 8.4–10.2)
CHLORIDE SERPL-SCNC: 102 MMOL/L (ref 96–108)
CHOLEST SERPL-MCNC: 97 MG/DL
CO2 SERPL-SCNC: 27 MMOL/L (ref 21–32)
CREAT SERPL-MCNC: 1.09 MG/DL (ref 0.6–1.3)
EOSINOPHIL # BLD AUTO: 0.3 THOUSAND/ÂΜL (ref 0–0.61)
EOSINOPHIL NFR BLD AUTO: 3 % (ref 0–6)
ERYTHROCYTE [DISTWIDTH] IN BLOOD BY AUTOMATED COUNT: 13.4 % (ref 11.6–15.1)
EST. AVERAGE GLUCOSE BLD GHB EST-MCNC: 192 MG/DL
GFR SERPL CREATININE-BSD FRML MDRD: 66 ML/MIN/1.73SQ M
GLUCOSE P FAST SERPL-MCNC: 198 MG/DL (ref 65–99)
HBA1C MFR BLD: 8.3 %
HCT VFR BLD AUTO: 39.5 % (ref 36.5–49.3)
HDLC SERPL-MCNC: 37 MG/DL
HGB BLD-MCNC: 12.5 G/DL (ref 12–17)
IMM GRANULOCYTES # BLD AUTO: 0.03 THOUSAND/UL (ref 0–0.2)
IMM GRANULOCYTES NFR BLD AUTO: 0 % (ref 0–2)
LDLC SERPL CALC-MCNC: 35 MG/DL (ref 0–100)
LYMPHOCYTES # BLD AUTO: 2.46 THOUSANDS/ÂΜL (ref 0.6–4.47)
LYMPHOCYTES NFR BLD AUTO: 22 % (ref 14–44)
MCH RBC QN AUTO: 28.5 PG (ref 26.8–34.3)
MCHC RBC AUTO-ENTMCNC: 31.6 G/DL (ref 31.4–37.4)
MCV RBC AUTO: 90 FL (ref 82–98)
MONOCYTES # BLD AUTO: 0.6 THOUSAND/ÂΜL (ref 0.17–1.22)
MONOCYTES NFR BLD AUTO: 5 % (ref 4–12)
NEUTROPHILS # BLD AUTO: 7.71 THOUSANDS/ÂΜL (ref 1.85–7.62)
NEUTS SEG NFR BLD AUTO: 70 % (ref 43–75)
NONHDLC SERPL-MCNC: 60 MG/DL
NRBC BLD AUTO-RTO: 0 /100 WBCS
PLATELET # BLD AUTO: 224 THOUSANDS/UL (ref 149–390)
PMV BLD AUTO: 10.3 FL (ref 8.9–12.7)
POTASSIUM SERPL-SCNC: 5.3 MMOL/L (ref 3.5–5.3)
PROT SERPL-MCNC: 7.3 G/DL (ref 6.4–8.4)
PSA SERPL-MCNC: 3.62 NG/ML (ref 0–4)
RBC # BLD AUTO: 4.39 MILLION/UL (ref 3.88–5.62)
SODIUM SERPL-SCNC: 138 MMOL/L (ref 135–147)
TRIGL SERPL-MCNC: 123 MG/DL
TSH SERPL DL<=0.05 MIU/L-ACNC: 1.75 UIU/ML (ref 0.45–4.5)
WBC # BLD AUTO: 11.14 THOUSAND/UL (ref 4.31–10.16)

## 2024-05-16 PROCEDURE — 84443 ASSAY THYROID STIM HORMONE: CPT

## 2024-05-16 PROCEDURE — 73110 X-RAY EXAM OF WRIST: CPT

## 2024-05-16 PROCEDURE — 80053 COMPREHEN METABOLIC PANEL: CPT

## 2024-05-16 PROCEDURE — 83036 HEMOGLOBIN GLYCOSYLATED A1C: CPT

## 2024-05-16 PROCEDURE — 85025 COMPLETE CBC W/AUTO DIFF WBC: CPT

## 2024-05-16 PROCEDURE — 36415 COLL VENOUS BLD VENIPUNCTURE: CPT

## 2024-05-16 PROCEDURE — G0103 PSA SCREENING: HCPCS

## 2024-05-16 PROCEDURE — 80061 LIPID PANEL: CPT

## 2024-05-17 ENCOUNTER — TELEPHONE (OUTPATIENT)
Dept: FAMILY MEDICINE CLINIC | Facility: CLINIC | Age: 74
End: 2024-05-17

## 2024-05-17 NOTE — TELEPHONE ENCOUNTER
----- Message from Luis Miguel Buchanan DO sent at 5/17/2024  7:13 AM EDT -----  Please let patient know labs are stable, hemoglobin A1c of 8.3%, continue medication as indicated.  White blood cell count was slightly elevated, we will just repeat next visit.  Cholesterol panel appropriate, electrolytes, kidney function, liver function, prostate levels, thyroid levels all within range.    Please confirm insulin dosages, Lantus and short acting for me to review if there is any changes that need to be made.

## 2024-05-21 ENCOUNTER — TELEPHONE (OUTPATIENT)
Dept: FAMILY MEDICINE CLINIC | Facility: CLINIC | Age: 74
End: 2024-05-21

## 2024-05-21 NOTE — TELEPHONE ENCOUNTER
Upon review of the In Basket request we  tried to reach out and now states number not in service.     Any additional questions or concerns should be emailed to the Practice Liaisons via the appropriate education email address, please do not reply via In Basket.    Thank you  Bernard Willis MA

## 2024-06-06 ENCOUNTER — EVALUATION (OUTPATIENT)
Dept: PHYSICAL THERAPY | Facility: CLINIC | Age: 74
End: 2024-06-06
Payer: MEDICARE

## 2024-06-06 DIAGNOSIS — R20.2 NUMBNESS AND TINGLING OF BOTH UPPER EXTREMITIES: ICD-10-CM

## 2024-06-06 DIAGNOSIS — M25.511 RIGHT SHOULDER PAIN, UNSPECIFIED CHRONICITY: ICD-10-CM

## 2024-06-06 DIAGNOSIS — R20.0 NUMBNESS AND TINGLING OF BOTH UPPER EXTREMITIES: ICD-10-CM

## 2024-06-06 DIAGNOSIS — M43.6 STIFFNESS OF CERVICAL SPINE: ICD-10-CM

## 2024-06-06 DIAGNOSIS — M54.2 CERVICAL PAIN (NECK): Primary | ICD-10-CM

## 2024-06-06 PROCEDURE — 97161 PT EVAL LOW COMPLEX 20 MIN: CPT

## 2024-06-06 PROCEDURE — 97110 THERAPEUTIC EXERCISES: CPT

## 2024-06-06 NOTE — PROGRESS NOTES
PT Evaluation     Today's date: 2024  Patient name: Donovan Burnette  : 1950  MRN: [id number]  Referring provider: Luis Miguel Buchanan*  Dx:   Encounter Diagnosis     ICD-10-CM    1. Cervical pain (neck)  M54.2 Ambulatory Referral to Physical Therapy      2. Numbness and tingling of both upper extremities  R20.0     R20.2       3. Stiffness of cervical spine  M43.6                      Assessment  Impairments: abnormal or restricted ROM, activity intolerance, impaired physical strength, lacks appropriate home exercise program, pain with function, scapular dyskinesis and poor posture   Functional limitations: difficulty turning head, looking up, and reaching overhead    Assessment details: 2024: Donovan Burnette is a 74 y.o. male who presents with R neck and proximal shoulder pain. On examination, pt demonstrates tenderness to palpation along R upper trapexius impaired cervical AROM, R shoulder AROM compared to contralateral UE, L shoulder strength compared to contralateral side, periscapular strength bilaterally, functional mobility, activity tolerance, muscular endurance, and posture. Due to these impairments, patient has difficulty standing, reaching, lifting, and performing overhead activity affecting his participation in house maintenance tasks including power washing, walking his dog, and moving into his new home. Pt does not demonstrate directional preference at this time, however, will benefit from continued mechanical assessment in upcoming session. Patient's clinical presentation is consistent with their referring diagnosis of Cervical pain (neck)  (primary encounter diagnosis), as well as Numbness and tingling of both upper extremities, Stiffness of cervical spine, and Right shoulder pain, unspecified chronicity. Patient has been educated in pathology, review of impairements, prognosis, activity modification, POC, and HEP. Pt was educated in spine anatomy and concept of peripheralization vs  centralization. Patient would benefit from skilled physical therapy services to address their aforementioned functional limitations and progress towards prior level of function and independence with home exercise program.     Plan: Ambulatory Referral to Physical Therapy        Goals  Short Term Goals to be met in 4 weeks (7/4/2024)  1. Pt to be independent w/ prelimary HEP to demonstrate active participation in recovery.  2. Improve cervical AROM to no greater than mod see for improved ability to look up and turn head needed for driving.   3. AROM R shoulder to be within 10 degrees of L shoulder for improved ability to reach arm overhead for house maintenance tasks.   4. L shoulder strength equal to or greater than 4/5 for improved ability to lift with LUE for less RUE stress with ADLs.     Long Term Goals to be met in 12 weeks (8/29/2024)  1. Pt will demo adherence to updated HEP to demonstrate ability to discharge to independent management of condition.   2. Improve cervical AROM to no greater than min see for improved ability to rotate neck and withstand 3 hr car ride without pain.   3. R shoulder to be within 5 degrees of L shoulder w/o pain to allow ease w/ ADL's and IADL's.   4. Improve BUE strength to greater than or equal to 4+/5 to allow lifting, reaching and carrying for walking his dog, household maintance tasks, and moving furniture/ boxes.   5. Pt will report no greater than 2/10 R neck pain with ADLs for progress in return to PLOF.       Plan  Patient would benefit from: PT eval and skilled physical therapy  Planned modality interventions: cryotherapy, thermotherapy: hydrocollator packs and unattended electrical stimulation    Planned therapy interventions: manual therapy, neuromuscular re-education, therapeutic exercise, therapeutic activities, home exercise program, stretching, patient education and postural training    Frequency: 2-3x/week.  Duration in weeks: 12  Plan of Care beginning date:  2024  Plan of Care expiration date: 2024  Treatment plan discussed with: patient  Plan details: HEP development and progression, monitor patients adherence to activity modification to ensure adequate healing time/ recovery. Implement stretching, A/AA/PROM, joint mobilizations, posture education, STM/MI as needed to reduce muscle tension, muscle reeducation. Progress strengthening for return to ADLs. PLOC discussed and agreed upon with patient.           Subjective Evaluation    History of Present Illness  Mechanism of injury: 2024: Pt states that he has limited cervical range of motion limiting him from rotating his head. He states that the muscle between his neck and shoulder appears to be swollen. Denies mechanism of injury. Stiffness and pain began greater than 6 months ago. He does have numbness and tingling along muscle from R side of neck to shoulder. When asked by PT, he states that he does have tingling into bilateral UE which goes into the fingers starting approximately 3 months ago; pt unable to recall when this started. Pt denies numbness and tingling in BLE. He suspects shoulder involvement. He has difficulty reaching high or keeping arm above his head. He has difficulty driving due to difficulty turning; he feels increase in pain with 2.5 hours of driving. He has difficulty keeping his head up for a while. A typical day consists of walking the dog, moving into his new home (moving furniture/ boxes),and house maintenance tasks including power washing. Pt has a hx of shoulder surgery bilaterally 25 years ago. Pt has hx of diabetes and HTN which is being treat by cardiologist.   Patient Goals  Patient goals for therapy: increased motion and decreased pain  Patient goal: improve mobility in neck  Pain  Current pain ratin  At best pain ratin  At worst pain ratin (standing, holding his head up)  Location: R upper trap ache; R shoulder pain  Quality: dull ache  Aggravating factors:  "overhead activity, lifting and standing (bending)    Hand dominance: right      Diagnostic Tests  No diagnostic tests performed  Treatments  Current treatment: physical therapy      Objective  Objective:   Cervical Screen: cervical AROM limitations    6/6/2024  Flexion  nil  Extension Max*  R rotation Max*  L rotation Mod*  R sidebend Mod*  L sidebend Mod  Retraction Mod*    AROM: standing  R   L      6/6/2024 6/6/2024  Shoulder flexion  122*(shoulder) 135*(R UT)  Shoulder abduction  115*   145  Shoulder ER@90  70*   75  Shoulder IR   L1   T11    STRENGTH:    R   L      6/6/2024 6/6/2024     Shoulder flexion  5/5   4/5  Shoulder abduction  4/5   5/5  Shoulder ER at trunk  4+/5   3+/5  Shoulder IR at trunk  4+/5   4-/5  Middle trap (standing)  4/5   4/5  Lower trap/ latissimus  NT   NT  *pt unable to lie prone for periscapular strength testing    Posture:   6/6/2024 Pt's sitting posture is poor. Forward head posture. Shoulders are anteriorly rounded.     Mechanical assessment:  6/6/2024 pre-test symptoms = 5/10 R UT pain; numbness and tingling present in bilateral UE.   Repeated cervical retraction 5\" x 10 = increase, no worse  Repeated cervical flexion 1 x 10 = increase, no worse   Repeated cervical extension 1 x 10 = NE  Repeated cervical and thoracic extension = increase, worse    Special Tests:   6/6/2024    Empty can R (+ for pain), L (- for pain)    Palpation  6/6/2024:    R UT = TTP from occiput along length of UT to acromion process   L UT = Denies TTP   Central cervical spine = denies pain w/ palpation to spinous process to level of C7   Screen for scapular dyskinesis = R: positive for mild to moderate scapular winging with shoulder abduction, L: no scapular winging noted with shoulder abduction     Precautions:   Past Medical History:   Diagnosis Date    Arthritis     Diabetes mellitus (HCC)     Hypertension     Kidney stone      Past Surgical History:   Procedure Laterality Date    CHOLECYSTECTOMY      " "HERNIA REPAIR      SHOULDER SURGERY Bilateral     TONSILLECTOMY         SOC: 6/6/2024  FOTO: 6/6/2024  POC Expiration: 8/29/2024  Daily Treatment Log  Date: Initial Evaluation  6/6/2024 Next session         Visit#/ auth: 1           Objective Measures              Mechanical assessment    Assess         Manuals                                                                     Neuro Re-Ed             Scapular retraction  5\" x 10           Scap set w/ ER              Low row              Shoulder ext             ER/IR cables (L shoulder)                                         Ther Ex  10'           Cervical extension  1 x 10            SNAG   Add depending on mechanical assessment      Supine cane flex        Pec stretch         SL open books (modified)        wall slides             table slide abd             Shoulder IR w/ cane                                                       EDUCATION: Pathology, review of impairements, prognosis, activity modification, POC, and HEP  KE           Ther Activity                                         Gait Training                                         Modalities                                         HEP:  Access Code: [email or web address]  URL: [email or web address]  Date: 06/06/2024  Prepared by: Kelly Bob    Exercises  - Seated Cervical Extension AROM  - 2 x daily - 1 sets - 10 reps  - Seated Scapular Retraction  - 2 x daily - 1 sets - 10 reps         "

## 2024-06-10 ENCOUNTER — TELEPHONE (OUTPATIENT)
Dept: PHYSICAL THERAPY | Facility: CLINIC | Age: 74
End: 2024-06-10

## 2024-06-10 ENCOUNTER — OFFICE VISIT (OUTPATIENT)
Dept: PHYSICAL THERAPY | Facility: CLINIC | Age: 74
End: 2024-06-10
Payer: MEDICARE

## 2024-06-10 DIAGNOSIS — R20.0 NUMBNESS AND TINGLING OF BOTH UPPER EXTREMITIES: ICD-10-CM

## 2024-06-10 DIAGNOSIS — M43.6 STIFFNESS OF CERVICAL SPINE: ICD-10-CM

## 2024-06-10 DIAGNOSIS — M25.511 RIGHT SHOULDER PAIN, UNSPECIFIED CHRONICITY: ICD-10-CM

## 2024-06-10 DIAGNOSIS — M54.2 CERVICAL PAIN (NECK): Primary | ICD-10-CM

## 2024-06-10 DIAGNOSIS — R20.2 NUMBNESS AND TINGLING OF BOTH UPPER EXTREMITIES: ICD-10-CM

## 2024-06-10 PROCEDURE — 97112 NEUROMUSCULAR REEDUCATION: CPT

## 2024-06-10 PROCEDURE — 97110 THERAPEUTIC EXERCISES: CPT

## 2024-06-10 NOTE — PROGRESS NOTES
Daily Note     Today's date: 6/10/2024  Patient name: Donovan Burnette  : 1950  MRN: [id number]  Referring provider: Luis Miguel Buchanan*  Dx:   Encounter Diagnosis     ICD-10-CM    1. Cervical pain (neck)  M54.2       2. Numbness and tingling of both upper extremities  R20.0     R20.2       3. Stiffness of cervical spine  M43.6       4. Right shoulder pain, unspecified chronicity  M25.511                      Subjective: Pt has been doing his home exercises 2 times per day. Pt states that he is sore across the upper back; feels like it is harder to lift his head up after the exercises. Numbness and tingling has not changed. On arrival to session pt has discomfort along superior aspect of R shoulder. Pt states that he takes his blood pressure 2 times per day; states that it is usually in the green. He believes systolic BP is usually 130. He does have a cardiologist. He states that he took his BP medication just before PT session.       Objective: See treatment diary below      Assessment: Pt reports familiar dizziness following SNAG; states that dizziness typically occurs with physical exertion. Dizziness subsides with rest, however does not fully resolve. Vitals taken and WNL. Pt reports return to baseline. Pt instructed to monitor dizziness and to seek medical attention if it persists. Pt states that MD is aware of this sensation. Pt reports stretching with exercises implemented today due to unfamiliarity with movements; denies pain. Tolerated treatment well. Patient would benefit from continued PT.       Plan: Continue per plan of care.  Progress treatment as tolerated.       Precautions:   Past Medical History:   Diagnosis Date    Arthritis     Diabetes mellitus (HCC)     Hypertension     Kidney stone      Past Surgical History:   Procedure Laterality Date    CHOLECYSTECTOMY      HERNIA REPAIR      SHOULDER SURGERY Bilateral     TONSILLECTOMY         SOC: 2024  FOTO: 2024  POC Expiration:  "8/29/2024  Daily Treatment Log  Date: Initial Evaluation  6/6/2024 6/10/2024  Next session       Visit#/ auth: 1  2         Objective Measures             Mechanical assessment             Vitals   End of session:  HR 85 bpm  SpO2 93%  /60 mmHg    End of session 5 min after initial reading:  BP: 110/60 mmHg      Manuals                                                                     Neuro Re-Ed    10'         Scapular retraction  5\" x 10  5\" x 10         Scap set w/ ER   1 x 10 YTB          Low row   1 x 15 YTB           Shoulder ext             ER/IR cables (L shoulder)                                         Ther Ex  10'  35'         Cervical extension  1 x 10  1 x 10 AROM         UT stretch  10\" x 5      Cervical rotation w/ towel for AAROM  5\" x 5 R/L (Instructed to perform for light stretch; pain-free) - dizziness reported post exercise HOLD     Supine cane flex  5\" x 10 B/L       Pec stretch   20\" x 3 doorway      SL open books (modified)  1 x 10 R/L  Perform in standing next session     wall slides   1 x 10 B/L         table slide abd   1 x 10 HOB elevated w/ physio ball R/L         Shoulder IR w/ cane   1 x 10 B/L                                                   EDUCATION: Pathology, review of impairements, prognosis, activity modification, POC, and HEP  KE   Updated this session       Ther Activity                                         Gait Training                                         Modalities                                         HEP:  Access Code: [email or web address]  URL: [email or web address]  Date: 06/06/2024  Prepared by: Kelly Bob    Exercises  - Seated Cervical Extension AROM  - 2 x daily - 1 sets - 10 reps  - Seated Scapular Retraction  - 2 x daily - 1 sets - 10 reps         "

## 2024-06-10 NOTE — TELEPHONE ENCOUNTER
Called pt to check on his status due to report of dizziness in session. Pt states that he is no longer symptomatic.     Kelly Bob, PT, DPT

## 2024-06-13 ENCOUNTER — OFFICE VISIT (OUTPATIENT)
Dept: PHYSICAL THERAPY | Facility: CLINIC | Age: 74
End: 2024-06-13
Payer: MEDICARE

## 2024-06-13 DIAGNOSIS — M43.6 STIFFNESS OF CERVICAL SPINE: ICD-10-CM

## 2024-06-13 DIAGNOSIS — M25.511 RIGHT SHOULDER PAIN, UNSPECIFIED CHRONICITY: ICD-10-CM

## 2024-06-13 DIAGNOSIS — M54.2 CERVICAL PAIN (NECK): Primary | ICD-10-CM

## 2024-06-13 DIAGNOSIS — R20.2 NUMBNESS AND TINGLING OF BOTH UPPER EXTREMITIES: ICD-10-CM

## 2024-06-13 DIAGNOSIS — R20.0 NUMBNESS AND TINGLING OF BOTH UPPER EXTREMITIES: ICD-10-CM

## 2024-06-13 PROCEDURE — 97110 THERAPEUTIC EXERCISES: CPT

## 2024-06-13 PROCEDURE — 97140 MANUAL THERAPY 1/> REGIONS: CPT

## 2024-06-13 PROCEDURE — 97112 NEUROMUSCULAR REEDUCATION: CPT

## 2024-06-13 NOTE — PROGRESS NOTES
"Daily Note     Today's date: 2024  Patient name: Donovan Burnette  : 1950  MRN: [id number]  Referring provider: Luis Miguel Buchanan*  Dx:   Encounter Diagnosis     ICD-10-CM    1. Cervical pain (neck)  M54.2       2. Numbness and tingling of both upper extremities  R20.0     R20.2       3. Stiffness of cervical spine  M43.6       4. Right shoulder pain, unspecified chronicity  M25.511                      Subjective: pt reports he has not noted much change in his symptoms with PT thus far. He does not report any dizziness on arrival and reports this is something that he does experience on and off with change of position at home.       Objective: See treatment diary below      Assessment: Tolerated treatment well. Pt tolerated additional reps of postural strengthening today with no complaints. Cont with B/L shoulder ERP. Pt dem mild increase in cerv ext ROM post manual therapy, no change in pain post session. Patient would benefit from continued PT      Plan: Continue per plan of care.      Precautions:   Past Medical History:   Diagnosis Date    Arthritis     Diabetes mellitus (HCC)     Hypertension     Kidney stone      Past Surgical History:   Procedure Laterality Date    CHOLECYSTECTOMY      HERNIA REPAIR      SHOULDER SURGERY Bilateral     TONSILLECTOMY         SOC: 2024  FOTO: 2024  POC Expiration: 2024  Daily Treatment Log  Date: Initial Evaluation  2024 6/10/2024 2024       Visit#/ auth: 1  2  3        Objective Measures             Mechanical assessment             Vitals   End of session:  HR 85 bpm  SpO2 93%  /60 mmHg    End of session 5 min after initial reading:  BP: 110/60 mmHg      Manuals              IASTM B/L cerv      10'                                                  Neuro Re-Ed    10'  10'        Scapular retraction  5\" x 10  5\" x 10         Scap set w/ ER   1 x 10 YTB          Low row   1 x 15 YTB   red tubing 2x10         Shoulder ext      red tubing " "2x10        ER/IR cables (L shoulder)                                         Ther Ex  10'  35'  20'        Cervical extension  1 x 10  1 x 10 AROM  AROM 1x10        UT stretch  10\" x 5      Cervical rotation w/ towel for AAROM  5\" x 5 R/L (Instructed to perform for light stretch; pain-free) - dizziness reported post exercise HOLD      Supine cerv AROM rotation 1x10 R/L      Supine cerv retract w/ self OP    3\"x5   Inc soreness      Supine cane flex  5\" x 10 B/L  5\"x10      Pec stretch   20\" x 3 doorway 20\" x 3 doorway     SL open books (modified)  1 x 10 R/L  1x10 R/L      wall slides   1 x 10 B/L  3\"x10 B/L        table slide abd   1 x 10 HOB elevated w/ physio ball R/L         Shoulder IR w/ cane   1 x 10 B/L  1x10 B/L                                                  EDUCATION: Pathology, review of impairements, prognosis, activity modification, POC, and HEP  KE         Ther Activity                                         Gait Training                                         Modalities                                         HEP:  Access Code: [email or web address]  URL: [email or web address]  Date: 06/06/2024  Prepared by: Kelly Bob    Exercises  - Seated Cervical Extension AROM  - 2 x daily - 1 sets - 10 reps  - Seated Scapular Retraction  - 2 x daily - 1 sets - 10 reps           "

## 2024-06-17 ENCOUNTER — OFFICE VISIT (OUTPATIENT)
Dept: PHYSICAL THERAPY | Facility: CLINIC | Age: 74
End: 2024-06-17
Payer: MEDICARE

## 2024-06-17 ENCOUNTER — OFFICE VISIT (OUTPATIENT)
Dept: FAMILY MEDICINE CLINIC | Facility: CLINIC | Age: 74
End: 2024-06-17
Payer: MEDICARE

## 2024-06-17 VITALS
HEIGHT: 73 IN | BODY MASS INDEX: 38.28 KG/M2 | TEMPERATURE: 98.2 F | OXYGEN SATURATION: 97 % | DIASTOLIC BLOOD PRESSURE: 60 MMHG | SYSTOLIC BLOOD PRESSURE: 120 MMHG | HEART RATE: 78 BPM | WEIGHT: 288.8 LBS | RESPIRATION RATE: 18 BRPM

## 2024-06-17 DIAGNOSIS — M54.2 CERVICAL PAIN (NECK): Primary | ICD-10-CM

## 2024-06-17 DIAGNOSIS — R20.0 NUMBNESS AND TINGLING OF BOTH UPPER EXTREMITIES: ICD-10-CM

## 2024-06-17 DIAGNOSIS — E11.9 TYPE 2 DIABETES MELLITUS WITHOUT COMPLICATION, WITH LONG-TERM CURRENT USE OF INSULIN (HCC): ICD-10-CM

## 2024-06-17 DIAGNOSIS — M54.2 CERVICAL PAIN (NECK): ICD-10-CM

## 2024-06-17 DIAGNOSIS — R20.2 NUMBNESS AND TINGLING OF BOTH UPPER EXTREMITIES: ICD-10-CM

## 2024-06-17 DIAGNOSIS — M25.532 LEFT WRIST PAIN: ICD-10-CM

## 2024-06-17 DIAGNOSIS — R55 SYNCOPE, UNSPECIFIED SYNCOPE TYPE: ICD-10-CM

## 2024-06-17 DIAGNOSIS — H91.93 BILATERAL HEARING LOSS, UNSPECIFIED HEARING LOSS TYPE: ICD-10-CM

## 2024-06-17 DIAGNOSIS — M25.511 CHRONIC RIGHT SHOULDER PAIN: ICD-10-CM

## 2024-06-17 DIAGNOSIS — E66.01 OBESITY, MORBID (HCC): ICD-10-CM

## 2024-06-17 DIAGNOSIS — Z79.4 TYPE 2 DIABETES MELLITUS WITHOUT COMPLICATION, WITH LONG-TERM CURRENT USE OF INSULIN (HCC): ICD-10-CM

## 2024-06-17 DIAGNOSIS — M43.6 STIFFNESS OF CERVICAL SPINE: ICD-10-CM

## 2024-06-17 DIAGNOSIS — I10 PRIMARY HYPERTENSION: ICD-10-CM

## 2024-06-17 DIAGNOSIS — Z12.12 ENCOUNTER FOR COLORECTAL CANCER SCREENING: ICD-10-CM

## 2024-06-17 DIAGNOSIS — G89.29 CHRONIC RIGHT SHOULDER PAIN: ICD-10-CM

## 2024-06-17 DIAGNOSIS — N18.31 STAGE 3A CHRONIC KIDNEY DISEASE (HCC): ICD-10-CM

## 2024-06-17 DIAGNOSIS — Z00.00 MEDICARE ANNUAL WELLNESS VISIT, SUBSEQUENT: Primary | ICD-10-CM

## 2024-06-17 DIAGNOSIS — Z12.11 ENCOUNTER FOR COLORECTAL CANCER SCREENING: ICD-10-CM

## 2024-06-17 DIAGNOSIS — M25.511 RIGHT SHOULDER PAIN, UNSPECIFIED CHRONICITY: ICD-10-CM

## 2024-06-17 DIAGNOSIS — D69.6 PLATELETS DECREASED (HCC): ICD-10-CM

## 2024-06-17 PROCEDURE — 97110 THERAPEUTIC EXERCISES: CPT

## 2024-06-17 PROCEDURE — 99214 OFFICE O/P EST MOD 30 MIN: CPT | Performed by: STUDENT IN AN ORGANIZED HEALTH CARE EDUCATION/TRAINING PROGRAM

## 2024-06-17 PROCEDURE — G0439 PPPS, SUBSEQ VISIT: HCPCS | Performed by: STUDENT IN AN ORGANIZED HEALTH CARE EDUCATION/TRAINING PROGRAM

## 2024-06-17 RX ORDER — TIRZEPATIDE 10 MG/.5ML
10 INJECTION, SOLUTION SUBCUTANEOUS
COMMUNITY
Start: 2024-04-08

## 2024-06-17 NOTE — PROGRESS NOTES
Daily Note     Today's date: 2024  Patient name: Donovan Burnette  : 1950  MRN: [id number]  Referring provider: Luis Miguel Buchanan*  Dx:   Encounter Diagnosis     ICD-10-CM    1. Cervical pain (neck)  M54.2       2. Numbness and tingling of both upper extremities  R20.0     R20.2       3. Stiffness of cervical spine  M43.6       4. Right shoulder pain, unspecified chronicity  M25.511                      Subjective: Pt states that he has a follow up appointment with referring provider today at 2:30 pm. He attributes pain to swelling along right upper trap. Pt states that since onset of R shoulder swelling, his pain has been the same. He does not think PT will help to decrease the swelling. Pt states that he transitioned to using R hand to hold dog leash due to L wrist discomfort but this was after onset of R neck/ shoulder discomfort. Pt states that he experiences familiar dizziness with transitioning from supine to sit.       Objective: See treatment diary below      Assessment: Instructed pt to perform cervical AROM within pain-free range of motion. Pt with tendency to flex cervical spine with standing wall slides; instructed pt to keep head upright. Pt instructed to discuss symptoms of familiar dizziness with supine to sit transfer, numbness and tingling in BUE, and R neck discomfort at follow-up appt with MD. Added wall slides to HEP. Plan to add shoulder extension and low rows to HEP once pt demonstrates familiarity with these exercises and less verbal cues are required. Tolerated treatment well. Patient would benefit from continued PT.       Plan: Continue per plan of care.  Progress treatment as tolerated.       Precautions:   Past Medical History:   Diagnosis Date    Arthritis     Diabetes mellitus (HCC)     Hypertension     Kidney stone      Past Surgical History:   Procedure Laterality Date    CHOLECYSTECTOMY      HERNIA REPAIR      SHOULDER SURGERY Bilateral     TONSILLECTOMY         SOC:  "6/6/2024  FOTO: 6/6/2024  POC Expiration: 8/29/2024  Daily Treatment Log  Date: Initial Evaluation  6/6/2024 6/10/2024 6/13/2024  6/17/2024  Next session   Visit#/ auth: 1  2  3   4  NEEDS FOTO   Objective Measures             Mechanical assessment             Vitals   End of session:  HR 85 bpm  SpO2 93%  /60 mmHg    End of session 5 min after initial reading:  BP: 110/60 mmHg  End of session:  HR 78 bpm  SpO2 99%  /58 mmHg    Manuals              IASTM B/L cerv      10'                                                  Neuro Re-Ed    10'  10'   10'     Scapular retraction  5\" x 10  5\" x 10    5\" x 10     Scap set w/ ER   1 x 10 YTB          Low row   1 x 15 YTB   red tubing 2x10  Red tubing 2x12      Shoulder ext      red tubing 2x10  Red tubing 2x12     ER/IR cables (L shoulder)                                         Ther Ex  10'  35'  20'   25'     Cervical extension  1 x 10  1 x 10 AROM  AROM 1x10  AROM 1x10      UT stretch  10\" x 5      Cervical rotation w/ towel for AAROM  5\" x 5 R/L (Instructed to perform for light stretch; pain-free) - dizziness reported post exercise HOLD      Supine cerv AROM rotation 1x10 R/L      Supine cerv retract w/ self OP    3\"x5   Inc soreness      Supine cane flex  5\" x 10 B/L  5\"x10  5\"x10     Pec stretch   20\" x 3 doorway 20\" x 3 doorway 20\" x 3 doorway    SL open books (modified)  1 x 10 R/L  1x10 R/L  Standing w/ hand on hip 1 x 10     wall slides   1 x 10 B/L  3\"x10 B/L  1 x 10 pball R HOB elevated    3\" x 10 wall slide B/L     table slide abd   1 x 10 HOB elevated w/ physio ball R/L   1 x 10 R HOB elevated w/ physio ball R     Shoulder IR w/ cane   1 x 10 B/L  1x10 B/L  1x10 B/L                                                EDUCATION: Pathology, review of impairements, prognosis, activity modification, POC, and HEP  KE    HEP updated and reviewed     Ther Activity                                         Gait Training                                       "   Modalities                                         HEP:  Access Code: [email or web address]  URL: [email or web address]  Date: 06/17/2024  Prepared by: Kelly Bob    Exercises  - Seated Scapular Retraction  - 2 x daily - 1 sets - 10 reps  - Seated Cervical Extension AROM  - 2 x daily - 1 sets - 10 reps  - Standing shoulder flexion wall slides  - 2 x daily - 1 sets - 10 reps

## 2024-06-17 NOTE — PROGRESS NOTES
Ambulatory Visit  Name: Donovan Burnette      : 1950      MRN: [id number]  Encounter Provider: Luis Miguel Buchanan DO  Encounter Date: 2024   Encounter department: [facility]    Assessment & Plan   1. Medicare annual wellness visit, subsequent  2. Primary hypertension  3. Type 2 diabetes mellitus without complication, with long-term current use of insulin (HCC)  4. Stage 3a chronic kidney disease (HCC)  5. Platelets decreased (HCC)  6. Syncope, unspecified syncope type  7. Obesity, morbid (HCC)  8. Bilateral hearing loss, unspecified hearing loss type  -     Ambulatory Referral to Audiology; Future  9. Cervical pain (neck)  10. Encounter for colorectal cancer screening  -     Cologuard  11. Left wrist pain  -     Ambulatory Referral to Orthopedic Surgery; Future  12. Chronic right shoulder pain    Depression Screening and Follow-up Plan: Patient was screened for depression during today's encounter. They screened negative with a PHQ-2 score of 0.      Patient is a pleasant 74-year-old male coming in for Medicare annual wellness visit.    Type 2 diabetes mellitus with long-term current use of insulin, patient has been getting medication from cardiologist, discussed with patient if he wishes I can take over diabetic medication.  Patient is currently working with insurance for appropriate medication coverage.    Leukocytosis, patient notes he will be following up with routine blood work with cardiologist, if not checked, will recheck for next visit.  No signs of infection.    Primary hypertension, blood pressure well-controlled, continue valsartan/hydrochlorothiazide.    Cervical neck pain with chronic right shoulder pain, continue physical therapy for 4-6 weeks, if no improvement evaluation with orthopedics with advanced imaging recommended.    Patient notes ulnar left wrist pain that is not improving, recommend hand surgery evaluation, x-ray imaging does show severe osteoarthritis  of left first carpometacarpal joint.    Mixed hyperlipidemia, cholesterol panel appropriate.    Continue appropriate medication, continue to control hemoglobin A1c, obtain cardiology evaluation, follow-up next visit in 3-4 months, diabetic check.    Preventive health issues were discussed with patient, and age appropriate screening tests were ordered as noted in patient's After Visit Summary. Personalized health advice and appropriate referrals for health education or preventive services given if needed, as noted in patient's After Visit Summary.    History of Present Illness     HPI   Patient Care Team:  Luis Miguel Buchanan DO as PCP - General (Family Medicine)    Review of Systems   Constitutional:  Negative for chills, fatigue and fever.   HENT:  Negative for congestion and sore throat.    Eyes:  Negative for pain and visual disturbance.   Respiratory:  Negative for shortness of breath and wheezing.    Cardiovascular:  Negative for chest pain and palpitations.   Gastrointestinal:  Negative for abdominal pain, constipation, diarrhea, nausea and vomiting.   Genitourinary:  Negative for dysuria and frequency.   Musculoskeletal:  Positive for back pain, neck pain and neck stiffness.   Skin:  Negative for color change and rash.   Neurological:  Negative for dizziness and headaches.   Psychiatric/Behavioral:  Negative for agitation and confusion.    All other systems reviewed and are negative.    Medical History Reviewed by provider this encounter:       Annual Wellness Visit Questionnaire   Donovan is here for his Subsequent Wellness visit. Last Medicare Wellness visit information reviewed, patient interviewed and updates made to the record today.      Health Risk Assessment:   Patient rates overall health as fair. Patient feels that their physical health rating is same. Patient is satisfied with their life. Eyesight was rated as same. Hearing was rated as slightly worse. Patient feels that their emotional and  mental health rating is same. Patients states they are never, rarely angry. Patient states they are always unusually tired/fatigued. Pain experienced in the last 7 days has been a lot. Patient's pain rating has been 8/10. Patient states that he has experienced weight loss or gain in last 6 months.     Depression Screening:   PHQ-2 Score: 0      Fall Risk Screening:   In the past year, patient has experienced: no history of falling in past year      Home Safety:  Patient does not have trouble with stairs inside or outside of their home. Patient has working smoke alarms and has working carbon monoxide detector. Home safety hazards include: none.     Nutrition:   Current diet is Regular.     Medications:   Patient is currently taking over-the-counter supplements. OTC medications include: see medication list. Patient is able to manage medications.     Activities of Daily Living (ADLs)/Instrumental Activities of Daily Living (IADLs):   Walk and transfer into and out of bed and chair?: Yes  Dress and groom yourself?: Yes    Bathe or shower yourself?: Yes    Feed yourself? Yes  Do your laundry/housekeeping?: Yes  Manage your money, pay your bills and track your expenses?: Yes  Make your own meals?: Yes    Do your own shopping?: Yes    Previous Hospitalizations:   Any hospitalizations or ED visits within the last 12 months?: Yes    How many hospitalizations have you had in the last year?: 1-2    Advance Care Planning:   Living will: No    Durable POA for healthcare: No    Advanced directive: No    Advanced directive counseling given: Yes      Cognitive Screening:   Provider or family/friend/caregiver concerned regarding cognition?: No    PREVENTIVE SCREENINGS      Cardiovascular Screening:    General: History Lipid Disorder and Screening Current      Diabetes Screening:     General: History Diabetes and Screening Current      Colorectal Cancer Screening:     General: Risks and Benefits Discussed    Due for: Balaji       Prostate Cancer Screening:    General: Screening Current      Osteoporosis Screening:    General: Screening Not Indicated      Abdominal Aortic Aneurysm (AAA) Screening:    Risk factors include: age between 65-76 yo and tobacco use        General: Screening Not Indicated      Lung Cancer Screening:     General: Screening Not Indicated      Hepatitis C Screening:    General: Screening Not Indicated    Screening, Brief Intervention, and Referral to Treatment (SBIRT)    Screening  Typical number of drinks in a day: 0  Typical number of drinks in a week: 0  Interpretation: Low risk drinking behavior.    Single Item Drug Screening:  How often have you used an illegal drug (including marijuana) or a prescription medication for non-medical reasons in the past year? never    Single Item Drug Screen Score: 0  Interpretation: Negative screen for possible drug use disorder    Brief Intervention  Alcohol & drug use screenings were reviewed. No concerns regarding substance use disorder identified.     Other Counseling Topics:   Car/seat belt/driving safety, skin self-exam, sunscreen and calcium and vitamin D intake and regular weightbearing exercise.     Social Determinants of Health     Food Insecurity: No Food Insecurity (6/17/2024)    Hunger Vital Sign    • Worried About Running Out of Food in the Last Year: Never true    • Ran Out of Food in the Last Year: Never true   Transportation Needs: No Transportation Needs (6/17/2024)    PRAPARE - Transportation    • Lack of Transportation (Medical): No    • Lack of Transportation (Non-Medical): No   Housing Stability: Low Risk  (6/17/2024)    Housing Stability Vital Sign    • Unable to Pay for Housing in the Last Year: No    • Number of Times Moved in the Last Year: 1    • Homeless in the Last Year: No   Utilities: Not At Risk (6/17/2024)    [facility] Utilities    • Threatened with loss of utilities: No     No results found.    Objective     /60 (BP Location: Left arm, Patient  "Position: Sitting, Cuff Size: Large)   Pulse 78   Temp 98.2 °F (36.8 °C) (Temporal)   Resp 18   Ht 6' 1\" (1.854 m)   Wt 131 kg (288 lb 12.8 oz)   SpO2 97%   BMI 38.10 kg/m²     Physical Exam  Vitals and nursing note reviewed.   Constitutional:       General: He is not in acute distress.     Appearance: He is well-developed.   HENT:      Head: Normocephalic and atraumatic.   Eyes:      General: No scleral icterus.     Conjunctiva/sclera: Conjunctivae normal.   Cardiovascular:      Rate and Rhythm: Normal rate and regular rhythm.      Pulses: Normal pulses.      Heart sounds: No murmur heard.  Pulmonary:      Effort: Pulmonary effort is normal. No respiratory distress.      Breath sounds: Normal breath sounds.   Abdominal:      General: Bowel sounds are normal. There is no distension.      Palpations: Abdomen is soft.      Tenderness: There is no abdominal tenderness.   Musculoskeletal:         General: No swelling. Normal range of motion.      Cervical back: Neck supple.   Skin:     General: Skin is warm and dry.      Capillary Refill: Capillary refill takes less than 2 seconds.   Neurological:      General: No focal deficit present.      Mental Status: He is alert and oriented to person, place, and time. Mental status is at baseline.   Psychiatric:         Mood and Affect: Mood normal.         Behavior: Behavior normal.       Administrative Statements           "

## 2024-06-17 NOTE — PATIENT INSTRUCTIONS
Medicare Preventive Visit Patient Instructions  Thank you for completing your Welcome to Medicare Visit or Medicare Annual Wellness Visit today. Your next wellness visit will be due in one year (6/18/2025).  The screening/preventive services that you may require over the next 5-10 years are detailed below. Some tests may not apply to you based off risk factors and/or age. Screening tests ordered at today's visit but not completed yet may show as past due. Also, please note that scanned in results may not display below.  Preventive Screenings:  Service Recommendations Previous Testing/Comments   Colorectal Cancer Screening  Colonoscopy    Fecal Occult Blood Test (FOBT)/Fecal Immunochemical Test (FIT)  Fecal DNA/Cologuard Test  Flexible Sigmoidoscopy Age: 45-75 years old   Colonoscopy: every 10 years (May be performed more frequently if at higher risk)  OR  FOBT/FIT: every 1 year  OR  Cologuard: every 3 years  OR  Sigmoidoscopy: every 5 years  Screening may be recommended earlier than age 45 if at higher risk for colorectal cancer. Also, an individualized decision between you and your healthcare provider will decide whether screening between the ages of 76-85 would be appropriate. Colonoscopy: Not on file  FOBT/FIT: Not on file  Cologuard: Not on file  Sigmoidoscopy: Not on file          Prostate Cancer Screening Individualized decision between patient and health care provider in men between ages of 55-69   Medicare will cover every 12 months beginning on the day after your 50th birthday PSA: 3.62 ng/mL     Screening Current     Hepatitis C Screening Once for adults born between 1945 and 1965  More frequently in patients at high risk for Hepatitis C Hep C Antibody: Not on file        Diabetes Screening 1-2 times per year if you're at risk for diabetes or have pre-diabetes Fasting glucose: 198 mg/dL (5/16/2024)  A1C: 8.3 % (5/16/2024)  Screening Not Indicated  History Diabetes   Cholesterol Screening Once every 5 years  if you don't have a lipid disorder. May order more often based on risk factors. Lipid panel: 05/16/2024  Screening Not Indicated  History Lipid Disorder      Other Preventive Screenings Covered by Medicare:  Abdominal Aortic Aneurysm (AAA) Screening: covered once if your at risk. You're considered to be at risk if you have a family history of AAA or a male between the age of 65-75 who smoking at least 100 cigarettes in your lifetime.  Lung Cancer Screening: covers low dose CT scan once per year if you meet all of the following conditions: (1) Age 55-77; (2) No signs or symptoms of lung cancer; (3) Current smoker or have quit smoking within the last 15 years; (4) You have a tobacco smoking history of at least 20 pack years (packs per day x number of years you smoked); (5) You get a written order from a healthcare provider.  Glaucoma Screening: covered annually if you're considered high risk: (1) You have diabetes OR (2) Family history of glaucoma OR (3)  aged 50 and older OR (4)  American aged 65 and older  Osteoporosis Screening: covered every 2 years if you meet one of the following conditions: (1) Have a vertebral abnormality; (2) On glucocorticoid therapy for more than 3 months; (3) Have primary hyperparathyroidism; (4) On osteoporosis medications and need to assess response to drug therapy.  HIV Screening: covered annually if you're between the age of 15-65. Also covered annually if you are younger than 15 and older than 65 with risk factors for HIV infection. For pregnant patients, it is covered up to 3 times per pregnancy.    Immunizations:  Immunization Recommendations   Influenza Vaccine Annual influenza vaccination during flu season is recommended for all persons aged >= 6 months who do not have contraindications   Pneumococcal Vaccine   * Pneumococcal conjugate vaccine = PCV13 (Prevnar 13), PCV15 (Vaxneuvance), PCV20 (Prevnar 20)  * Pneumococcal polysaccharide vaccine = PPSV23  (Pneumovax) Adults 19-63 yo with certain risk factors or if 65+ yo  If never received any pneumonia vaccine: recommend Prevnar 20 (PCV20)  Give PCV20 if previously received 1 dose of PCV13 or PPSV23   Hepatitis B Vaccine 3 dose series if at intermediate or high risk (ex: diabetes, end stage renal disease, liver disease)   Respiratory syncytial virus (RSV) Vaccine - COVERED BY MEDICARE PART D  * RSVPreF3 (Arexvy) CDC recommends that adults 60 years of age and older may receive a single dose of RSV vaccine using shared clinical decision-making (SCDM)   Tetanus (Td) Vaccine - COST NOT COVERED BY MEDICARE PART B Following completion of primary series, a booster dose should be given every 10 years to maintain immunity against tetanus. Td may also be given as tetanus wound prophylaxis.   Tdap Vaccine - COST NOT COVERED BY MEDICARE PART B Recommended at least once for all adults. For pregnant patients, recommended with each pregnancy.   Shingles Vaccine (Shingrix) - COST NOT COVERED BY MEDICARE PART B  2 shot series recommended in those 19 years and older who have or will have weakened immune systems or those 50 years and older     Health Maintenance Due:      Topic Date Due   • Hepatitis C Screening  Never done   • Colorectal Cancer Screening  Never done     Immunizations Due:      Topic Date Due   • Pneumococcal Vaccine: 65+ Years (1 of 2 - PCV) Never done   • COVID-19 Vaccine (1 - 2023-24 season) Never done   • Influenza Vaccine (Season Ended) 09/01/2024     Advance Directives   What are advance directives?  Advance directives are legal documents that state your wishes and plans for medical care. These plans are made ahead of time in case you lose your ability to make decisions for yourself. Advance directives can apply to any medical decision, such as the treatments you want, and if you want to donate organs.   What are the types of advance directives?  There are many types of advance directives, and each state has  rules about how to use them. You may choose a combination of any of the following:  Living will:  This is a written record of the treatment you want. You can also choose which treatments you do not want, which to limit, and which to stop at a certain time. This includes surgery, medicine, IV fluid, and tube feedings.   Durable power of  for healthcare (DPAHC):  This is a written record that states who you want to make healthcare choices for you when you are unable to make them for yourself. This person, called a proxy, is usually a family member or a friend. You may choose more than 1 proxy.  Do not resuscitate (DNR) order:  A DNR order is used in case your heart stops beating or you stop breathing. It is a request not to have certain forms of treatment, such as CPR. A DNR order may be included in other types of advance directives.  Medical directive:  This covers the care that you want if you are in a coma, near death, or unable to make decisions for yourself. You can list the treatments you want for each condition. Treatment may include pain medicine, surgery, blood transfusions, dialysis, IV or tube feedings, and a ventilator (breathing machine).  Values history:  This document has questions about your views, beliefs, and how you feel and think about life. This information can help others choose the care that you would choose.  Why are advance directives important?  An advance directive helps you control your care. Although spoken wishes may be used, it is better to have your wishes written down. Spoken wishes can be misunderstood, or not followed. Treatments may be given even if you do not want them. An advance directive may make it easier for your family to make difficult choices about your care.   Weight Management   Why it is important to manage your weight:  Being overweight increases your risk of health conditions such as heart disease, high blood pressure, type 2 diabetes, and certain types of  cancer. It can also increase your risk for osteoarthritis, sleep apnea, and other respiratory problems. Aim for a slow, steady weight loss. Even a small amount of weight loss can lower your risk of health problems.  How to lose weight safely:  A safe and healthy way to lose weight is to eat fewer calories and get regular exercise. You can lose up about 1 pound a week by decreasing the number of calories you eat by 500 calories each day.   Healthy meal plan for weight management:  A healthy meal plan includes a variety of foods, contains fewer calories, and helps you stay healthy. A healthy meal plan includes the following:  Eat whole-grain foods more often.  A healthy meal plan should contain fiber. Fiber is the part of grains, fruits, and vegetables that is not broken down by your body. Whole-grain foods are healthy and provide extra fiber in your diet. Some examples of whole-grain foods are whole-wheat breads and pastas, oatmeal, brown rice, and bulgur.  Eat a variety of vegetables every day.  Include dark, leafy greens such as spinach, kale, gilberto greens, and mustard greens. Eat yellow and orange vegetables such as carrots, sweet potatoes, and winter squash.   Eat a variety of fruits every day.  Choose fresh or canned fruit (canned in its own juice or light syrup) instead of juice. Fruit juice has very little or no fiber.  Eat low-fat dairy foods.  Drink fat-free (skim) milk or 1% milk. Eat fat-free yogurt and low-fat cottage cheese. Try low-fat cheeses such as mozzarella and other reduced-fat cheeses.  Choose meat and other protein foods that are low in fat.  Choose beans or other legumes such as split peas or lentils. Choose fish, skinless poultry (chicken or turkey), or lean cuts of red meat (beef or pork). Before you cook meat or poultry, cut off any visible fat.   Use less fat and oil.  Try baking foods instead of frying them. Add less fat, such as margarine, sour cream, regular salad dressing and  mayonnaise to foods. Eat fewer high-fat foods. Some examples of high-fat foods include french fries, doughnuts, ice cream, and cakes.  Eat fewer sweets.  Limit foods and drinks that are high in sugar. This includes candy, cookies, regular soda, and sweetened drinks.  Exercise:  Exercise at least 30 minutes per day on most days of the week. Some examples of exercise include walking, biking, dancing, and swimming. You can also fit in more physical activity by taking the stairs instead of the elevator or parking farther away from stores. Ask your healthcare provider about the best exercise plan for you.      © Copyright [employer] 2018 Information is for End User's use only and may not be sold, redistributed or otherwise used for commercial purposes. All illustrations and images included in CareNotes® are the copyrighted property of [employer] or [employer]

## 2024-06-18 ENCOUNTER — TELEPHONE (OUTPATIENT)
Age: 74
End: 2024-06-18

## 2024-06-18 NOTE — TELEPHONE ENCOUNTER
Patient is being referred to a orthopedics. Please schedule accordingly.    [facility]   [phone]

## 2024-06-20 ENCOUNTER — OFFICE VISIT (OUTPATIENT)
Dept: PHYSICAL THERAPY | Facility: CLINIC | Age: 74
End: 2024-06-20
Payer: MEDICARE

## 2024-06-20 DIAGNOSIS — M54.2 CERVICAL PAIN (NECK): Primary | ICD-10-CM

## 2024-06-20 DIAGNOSIS — R20.0 NUMBNESS AND TINGLING OF BOTH UPPER EXTREMITIES: ICD-10-CM

## 2024-06-20 DIAGNOSIS — M43.6 STIFFNESS OF CERVICAL SPINE: ICD-10-CM

## 2024-06-20 DIAGNOSIS — M25.511 RIGHT SHOULDER PAIN, UNSPECIFIED CHRONICITY: ICD-10-CM

## 2024-06-20 DIAGNOSIS — R20.2 NUMBNESS AND TINGLING OF BOTH UPPER EXTREMITIES: ICD-10-CM

## 2024-06-20 PROCEDURE — 97112 NEUROMUSCULAR REEDUCATION: CPT

## 2024-06-20 PROCEDURE — 97110 THERAPEUTIC EXERCISES: CPT

## 2024-06-20 NOTE — PROGRESS NOTES
Daily Note     Today's date: 2024  Patient name: Donovan Burnette  : 1950  MRN: [id number]  Referring provider: Luis Miguel Buchanan*  Dx:   Encounter Diagnosis     ICD-10-CM    1. Cervical pain (neck)  M54.2       2. Numbness and tingling of both upper extremities  R20.0     R20.2       3. Stiffness of cervical spine  M43.6       4. Right shoulder pain, unspecified chronicity  M25.511                      Subjective: pt reports that tue morning he put together some deck furniture and over did it, he had a lot of pain and soreness in his back. He had no noted increased pain in his shoulder or neck with this. He feels that his pain has not changes with PT thus far.     Objective: See treatment diary below      Assessment: Tolerated treatment well. Pt edu with his impairments that have been present for many years, we would not expect a drastic change in his pain and ROM with 5 visits. Pt edu that the MD was correct in suggesting some additional appts, pt edu RE are usually scheduled after 10 visits/1month of PT to assess progress. Pt agreeable to this. Patient would benefit from continued PT      Plan: Continue per plan of care.      Precautions:   Past Medical History:   Diagnosis Date    Arthritis     Diabetes mellitus (HCC)     Hypertension     Kidney stone      Past Surgical History:   Procedure Laterality Date    CHOLECYSTECTOMY      HERNIA REPAIR      SHOULDER SURGERY Bilateral     TONSILLECTOMY         SOC: 2024  FOTO: 2024  POC Expiration: 2024  Daily Treatment Log  Date: Initial Evaluation  2024 6/10/2024 2024  2024 2024   Visit#/ auth: 1  2  3   4 5 FOTO    Objective Measures             Mechanical assessment             Vitals   End of session:  HR 85 bpm  SpO2 93%  /60 mmHg    End of session 5 min after initial reading:  BP: 110/60 mmHg  End of session:  HR 78 bpm  SpO2 99%  /58 mmHg    Manuals              IASTM B/L cerv      10'                     "                              Neuro Re-Ed    10'  10'   10'  10'    Scapular retraction  5\" x 10  5\" x 10    5\" x 10     Scap set w/ ER   1 x 10 YTB          Low row   1 x 15 YTB   red tubing 2x10  Red tubing 2x12  Red tubing 2x12    Shoulder ext      red tubing 2x10  Red tubing 2x12  Red tubing 2x12    ER/IR cables (L shoulder)                                         Ther Ex  10'  35'  20'   25'  30'    Cervical extension  1 x 10  1 x 10 AROM  AROM 1x10  AROM 1x10   w/ towel 2x10    UT stretch  10\" x 5      Cervical rotation w/ towel for AAROM  5\" x 5 R/L (Instructed to perform for light stretch; pain-free) - dizziness reported post exercise HOLD      Supine cerv AROM rotation 1x10 R/L   W/ self OP on chin 5\"x10 R/L    Supine cerv retract w/ self OP    3\"x5   Inc soreness      Supine cane flex  5\" x 10 B/L  5\"x10  5\"x10     Pec stretch   20\" x 3 doorway 20\" x 3 doorway 20\" x 3 doorway 20\" x 3 doorway    SL open books (modified)  1 x 10 R/L  1x10 R/L  Standing w/ hand on hip 1 x 10  Standing w/ hand on hip 1 x 10    wall slides   1 x 10 B/L  3\"x10 B/L  1 x 10 pball R HOB elevated    3\" x 10 wall slide B/L  3\"x10    table slide abd   1 x 10 HOB elevated w/ physio ball R/L   1 x 10 R HOB elevated w/ physio ball R     Shoulder IR w/ cane   1 x 10 B/L  1x10 B/L  1x10 B/L                                                EDUCATION: Pathology, review of impairements, prognosis, activity modification, POC, and HEP  KE    HEP updated and reviewed  pt edu 10'   HEP updated    Ther Activity                                         Gait Training                                         Modalities                                         Access Code: [email or web address]  URL: [email or web address]  Date: 06/20/2024  Prepared by: Jaclyn Moon    Exercises  - Seated Scapular Retraction  - 2 x daily - 1 sets - 10 reps  - Standing shoulder flexion wall slides  - 2 x daily - 1 sets - 10 reps  - Mid-Lower Cervical Extension SNAG with " Strap  - 1 x daily - 7 x weekly - 2 sets - 10 reps  - Standing Cervical Rotation AROM with Overpressure  - 1 x daily - 7 x weekly - 10 reps - 5sec hold

## 2024-06-24 ENCOUNTER — OFFICE VISIT (OUTPATIENT)
Dept: PHYSICAL THERAPY | Facility: CLINIC | Age: 74
End: 2024-06-24
Payer: MEDICARE

## 2024-06-24 DIAGNOSIS — M54.2 CERVICAL PAIN (NECK): Primary | ICD-10-CM

## 2024-06-24 DIAGNOSIS — R20.2 NUMBNESS AND TINGLING OF BOTH UPPER EXTREMITIES: ICD-10-CM

## 2024-06-24 DIAGNOSIS — R20.0 NUMBNESS AND TINGLING OF BOTH UPPER EXTREMITIES: ICD-10-CM

## 2024-06-24 DIAGNOSIS — M25.511 RIGHT SHOULDER PAIN, UNSPECIFIED CHRONICITY: ICD-10-CM

## 2024-06-24 DIAGNOSIS — M43.6 STIFFNESS OF CERVICAL SPINE: ICD-10-CM

## 2024-06-24 PROCEDURE — 97110 THERAPEUTIC EXERCISES: CPT

## 2024-06-24 NOTE — PROGRESS NOTES
"Daily Note     Today's date: 2024  Patient name: Donovan Burnette  : 1950  MRN: [id number]  Referring provider: Luis Miguel Buchanan*  Dx:   Encounter Diagnosis     ICD-10-CM    1. Cervical pain (neck)  M54.2       2. Numbness and tingling of both upper extremities  R20.0     R20.2       3. Stiffness of cervical spine  M43.6       4. Right shoulder pain, unspecified chronicity  M25.511                      Subjective: pt reports that he has some mild soreness after last session but not more than usual. Feels his shoulders are the same.   Pt arrived to session 10' late, was accommodated for.      Objective: See treatment diary below      Assessment: Tolerated treatment well. Pt re-edu that any improvement in his cerv ROM, which has been limited for years, is going to take more than a couple visits to improve. His compliance with cont stretching at home will aid in improvement of this. No increased pain post session. Patient would benefit from continued PT      Plan: Continue per plan of care.      Precautions:   Past Medical History:   Diagnosis Date    Arthritis     Diabetes mellitus (HCC)     Hypertension     Kidney stone      Past Surgical History:   Procedure Laterality Date    CHOLECYSTECTOMY      HERNIA REPAIR      SHOULDER SURGERY Bilateral     TONSILLECTOMY         SOC: 2024  FOTO: 2024  POC Expiration: 2024  Daily Treatment Log  Date: 2024   Visit#/ auth: 6   3   4 5 FOTO    Objective Measures            Mechanical assessment            Vitals     End of session:  HR 78 bpm  SpO2 99%  /58 mmHg    Manuals              IASTM B/L cerv      10'                                                  Neuro Re-Ed  10'   10'   10'  10'    Std UBE  3' retro        Scapular retraction      5\" x 10     Scap set w/ ER             Low row Grn tubing 2x10    red tubing 2x10  Red tubing 2x12  Red tubing 2x12    Shoulder ext Grn tubing 2x10    red tubing 2x10  " "Red tubing 2x12  Red tubing 2x12    ER/IR cables (L shoulder)                                        Ther Ex 25'   20'   25'  30'    Cervical extension W/ towel 2x10    AROM 1x10  AROM 1x10   w/ towel 2x10    Seated thoracic ext over MB  1x10        UT stretch        Cervical rotation w/ towel for AAROM W/ self OP on chin 5\"x10 R/L   HOLD      Supine cerv AROM rotation 1x10 R/L   W/ self OP on chin 5\"x10 R/L    Supine cerv retract w/ self OP    3\"x5   Inc soreness      Supine cane flex   5\"x10  5\"x10     Pec stretch  20\"x3 corner stretch   20\" x 3 doorway 20\" x 3 doorway 20\" x 3 doorway    SL open books (modified) 1x10 R/L std   1x10 R/L  Standing w/ hand on hip 1 x 10  Standing w/ hand on hip 1 x 10    wall slides 3\"x10    3\"x10 B/L  1 x 10 pball R HOB elevated    3\" x 10 wall slide B/L  3\"x10    table slide abd     1 x 10 R HOB elevated w/ physio ball R     Shoulder IR w/ cane 1x10 B/L    1x10 B/L  1x10 B/L                                                EDUCATION: Pathology, review of impairements, prognosis, activity modification, POC, and HEP    HEP updated and reviewed  pt edu 10'   HEP updated    Ther Activity                                         Gait Training                                         Modalities                                         Access Code: [email or web address]  URL: [email or web address]  Date: 06/20/2024  Prepared by: Jaclyn Moon    Exercises  - Seated Scapular Retraction  - 2 x daily - 1 sets - 10 reps  - Standing shoulder flexion wall slides  - 2 x daily - 1 sets - 10 reps  - Mid-Lower Cervical Extension SNAG with Strap  - 1 x daily - 7 x weekly - 2 sets - 10 reps  - Standing Cervical Rotation AROM with Overpressure  - 1 x daily - 7 x weekly - 10 reps - 5sec hold                 "

## 2024-06-26 LAB — COLOGUARD RESULT REPORTABLE: NEGATIVE

## 2024-06-27 ENCOUNTER — APPOINTMENT (OUTPATIENT)
Dept: PHYSICAL THERAPY | Facility: CLINIC | Age: 74
End: 2024-06-27
Payer: MEDICARE

## 2024-07-01 ENCOUNTER — OFFICE VISIT (OUTPATIENT)
Dept: PHYSICAL THERAPY | Facility: CLINIC | Age: 74
End: 2024-07-01
Payer: MEDICARE

## 2024-07-01 DIAGNOSIS — M43.6 STIFFNESS OF CERVICAL SPINE: ICD-10-CM

## 2024-07-01 DIAGNOSIS — R20.2 NUMBNESS AND TINGLING OF BOTH UPPER EXTREMITIES: ICD-10-CM

## 2024-07-01 DIAGNOSIS — M54.2 CERVICAL PAIN (NECK): Primary | ICD-10-CM

## 2024-07-01 DIAGNOSIS — R20.0 NUMBNESS AND TINGLING OF BOTH UPPER EXTREMITIES: ICD-10-CM

## 2024-07-01 DIAGNOSIS — M25.511 RIGHT SHOULDER PAIN, UNSPECIFIED CHRONICITY: ICD-10-CM

## 2024-07-01 PROCEDURE — 97110 THERAPEUTIC EXERCISES: CPT

## 2024-07-01 PROCEDURE — 97112 NEUROMUSCULAR REEDUCATION: CPT

## 2024-07-01 NOTE — PROGRESS NOTES
"Daily Note     Today's date: 2024  Patient name: Donovan Burnette  : 1950  MRN: [id number]  Referring provider: Luis Miguel Buchanan*  Dx:   Encounter Diagnosis     ICD-10-CM    1. Cervical pain (neck)  M54.2       2. Numbness and tingling of both upper extremities  R20.0     R20.2       3. Stiffness of cervical spine  M43.6       4. Right shoulder pain, unspecified chronicity  M25.511                      Subjective: pt reports that things feel the same in regard to his neck and shoulder, he does get some soreness after his exercises.       Objective: See treatment diary below      Assessment: Tolerated treatment well.Pt dem fatigue post session but not noted increased pain in cerv or shoulder. Cont to progress cerv/thoracic ROM as able per symptom irritability.  Patient would benefit from continued PT      Plan: Continue per plan of care.  Pt scheduled for RE , advised to call when he has his scheduled to try and get another appt in prior to this.      Precautions:   Past Medical History:   Diagnosis Date    Arthritis     Diabetes mellitus (HCC)     Hypertension     Kidney stone      Past Surgical History:   Procedure Laterality Date    CHOLECYSTECTOMY      HERNIA REPAIR      SHOULDER SURGERY Bilateral     TONSILLECTOMY         SOC: 2024  FOTO: 2024  POC Expiration: 2024  Daily Treatment Log  Date: 2024   Visit#/ auth: 6 7    4 5 FOTO    Objective Measures           Mechanical assessment           Vitals     End of session:  HR 78 bpm  SpO2 99%  /58 mmHg    Manuals             IASTM B/L cerv                                                    Neuro Re-Ed  10' 15'    10'  10'    Std UBE  3' retro  3' retro       Scapular retraction     5\" x 10     Scap set w/ ER   1x10          Low row Grn tubing 2x10  [place] 6# 2x10   Red tubing 2x12  Red tubing 2x12    Shoulder ext Grn tubing 2x10  [place] 6# 2x10   Red tubing 2x12  Red tubing 2x12    ER/IR " "cables (L shoulder)    kellen 2# 2x10 ea                                  Ther Ex 25' 20'    25'  30'    Cervical extension W/ towel 2x10  W/ towel 2x10   AROM 1x10   w/ towel 2x10    Seated thoracic ext over MB  1x10  1x10       UT stretch        Cervical rotation w/ towel for AAROM W/ self OP on chin 5\"x10 R/L  W/ self OP on chin 5\"x10 R/L     W/ self OP on chin 5\"x10 R/L            Supine cane flex    5\"x10     Pec stretch  20\"x3 corner stretch  20\"x3 corner stretch   20\" x 3 doorway 20\" x 3 doorway    SL open books (modified) 1x10 R/L std    Standing w/ hand on hip 1 x 10  Standing w/ hand on hip 1 x 10    wall slides 3\"x10  3\"x10   1 x 10 pball R HOB elevated    3\" x 10 wall slide B/L  3\"x10    table slide abd     1 x 10 R HOB elevated w/ physio ball R     Shoulder IR w/ cane 1x10 B/L    1x10 B/L                                                EDUCATION: Pathology, review of impairements, prognosis, activity modification, POC, and HEP    HEP updated and reviewed  pt edu 10'   HEP updated    Ther Activity                                         Gait Training                                         Modalities                                         Access Code: [email or web address]  URL: [email or web address]  Date: 06/20/2024  Prepared by: Jaclyn Moon    Exercises  - Seated Scapular Retraction  - 2 x daily - 1 sets - 10 reps  - Standing shoulder flexion wall slides  - 2 x daily - 1 sets - 10 reps  - Mid-Lower Cervical Extension SNAG with Strap  - 1 x daily - 7 x weekly - 2 sets - 10 reps  - Standing Cervical Rotation AROM with Overpressure  - 1 x daily - 7 x weekly - 10 reps - 5sec hold                   "

## 2024-07-09 ENCOUNTER — APPOINTMENT (OUTPATIENT)
Dept: PHYSICAL THERAPY | Facility: CLINIC | Age: 74
End: 2024-07-09
Payer: MEDICARE

## 2024-07-11 ENCOUNTER — EVALUATION (OUTPATIENT)
Dept: PHYSICAL THERAPY | Facility: CLINIC | Age: 74
End: 2024-07-11
Payer: MEDICARE

## 2024-07-11 DIAGNOSIS — M54.2 CERVICAL PAIN (NECK): Primary | ICD-10-CM

## 2024-07-11 DIAGNOSIS — M25.511 RIGHT SHOULDER PAIN, UNSPECIFIED CHRONICITY: ICD-10-CM

## 2024-07-11 DIAGNOSIS — R20.0 NUMBNESS AND TINGLING OF BOTH UPPER EXTREMITIES: ICD-10-CM

## 2024-07-11 DIAGNOSIS — M43.6 STIFFNESS OF CERVICAL SPINE: ICD-10-CM

## 2024-07-11 DIAGNOSIS — R20.2 NUMBNESS AND TINGLING OF BOTH UPPER EXTREMITIES: ICD-10-CM

## 2024-07-11 PROCEDURE — 97110 THERAPEUTIC EXERCISES: CPT

## 2024-07-11 PROCEDURE — 97112 NEUROMUSCULAR REEDUCATION: CPT

## 2024-07-11 NOTE — PROGRESS NOTES
PT Re-Evaluation     Today's date: 2024  Patient name: Donovan Burnette  : 1950  MRN: [id number]  Referring provider: Luis Miguel Buchanan*  Dx:   Encounter Diagnosis     ICD-10-CM    1. Cervical pain (neck)  M54.2       2. Numbness and tingling of both upper extremities  R20.0     R20.2       3. Stiffness of cervical spine  M43.6       4. Right shoulder pain, unspecified chronicity  M25.511                        Assessment  Impairments: abnormal or restricted ROM, activity intolerance, impaired physical strength, lacks appropriate home exercise program, pain with function, scapular dyskinesis and poor posture   Functional limitations: difficulty turning head, looking up, and reaching overhead    Assessment details: 2024: Donovan Burnette is a 74 y.o. male who presents with R neck and proximal shoulder pain. On examination, pt demonstrates tenderness to palpation along R upper trapexius impaired cervical AROM, R shoulder AROM compared to contralateral UE, L shoulder strength compared to contralateral side, periscapular strength bilaterally, functional mobility, activity tolerance, muscular endurance, and posture. Due to these impairments, patient has difficulty standing, reaching, lifting, and performing overhead activity affecting his participation in house maintenance tasks including power washing, walking his dog, and moving into his new home. Pt does not demonstrate directional preference at this time, however, will benefit from continued mechanical assessment in upcoming session. Patient's clinical presentation is consistent with their referring diagnosis of Cervical pain (neck)  (primary encounter diagnosis), as well as Numbness and tingling of both upper extremities, Stiffness of cervical spine, and Right shoulder pain, unspecified chronicity. Patient has been educated in pathology, review of impairements, prognosis, activity modification, POC, and HEP. Pt was educated in spine anatomy and  concept of peripheralization vs centralization. Patient would benefit from skilled physical therapy services to address their aforementioned functional limitations and progress towards prior level of function and independence with home exercise program.     7/11/2024: Donovan Burnette is a 74 y.o. male who presents with continued report of R neck/ proximal shoulder soreness. Discomfort is constant. Last MD visit 6/17/2024. Per chart review MD instructing pt to continue physical therapy for 4-6 weeks at that time and if no improvement to have evaluation with orthopedics with advanced imaging. On examination, pt demonstrates improved R shoulder AROM and BUE strength. Despite improvement pt continues to report constant 7/10 discomfort and impaired cervical AROM, shoulder AROM, BUE strength, functional mobility, activity tolerance, muscular endurance, and posture. Due to these impairments, patient has pain at rest and difficulty standing, reaching, lifting, and performing overhead activity affecting his participation in house maintenance tasks including power washing, walking his dog, and moving into his new home. Patient's clinical presentation is consistent with their referring diagnosis of Cervical pain (neck)  (primary encounter diagnosis), as well as Numbness and tingling of both upper extremities, Stiffness of cervical spine, and Right shoulder pain, unspecified chronicity. Patient has been educated in progress, remaining impairments, prognosis, activity modification, POC, and HEP. Pt instructed to contact MD about constant pain for additional testing as pain reported today is greater than on IE and constant. Patient would benefit from skilled physical therapy services to address their aforementioned functional limitations and progress towards prior level of function and independence with home exercise program.     Plan: Ambulatory Referral to Physical Therapy    Goals  Short Term Goals to be met in 4 weeks  (7/4/2024)  1. Pt to be independent w/ prelimary HEP to demonstrate active participation in recovery. (Progressing 7/11/2024)  2. Improve cervical AROM to no greater than mod see for improved ability to look up and turn head needed for driving. (Ongoing 7/11/2024)  3. AROM R shoulder to be within 10 degrees of L shoulder for improved ability to reach arm overhead for house maintenance tasks. (MET 7/11/2024)  4. L shoulder strength equal to or greater than 4/5 for improved ability to lift with LUE for less RUE stress with ADLs. (MET 7/11/2024)    Long Term Goals to be met in 12 weeks (8/29/2024)  1. Pt will demo adherence to updated HEP to demonstrate ability to discharge to independent management of condition. (Ongoing 7/11/2024)  2. Improve cervical AROM to no greater than min see for improved ability to rotate neck and withstand 3 hr car ride without pain. (Ongoing 7/11/2024)  3. R shoulder to be within 5 degrees of L shoulder w/o pain to allow ease w/ ADL's and IADL's.  (MET 7/11/2024)  4. Improve BUE strength to greater than or equal to 4+/5 to allow lifting, reaching and carrying for walking his dog, household maintance tasks, and moving furniture/ boxes. (Progressing 7/11/2024)  5. Pt will report no greater than 2/10 R neck pain with ADLs for progress in return to PLOF. (Not met 7/11/2024)        Plan  Patient would benefit from: PT eval and skilled physical therapy  Planned modality interventions: cryotherapy, thermotherapy: hydrocollator packs and unattended electrical stimulation    Planned therapy interventions: manual therapy, neuromuscular re-education, therapeutic exercise, therapeutic activities, home exercise program, stretching, patient education and postural training    Frequency: 2-3x/week.  Duration in weeks: 12  Plan of Care beginning date: 6/6/2024  Plan of Care expiration date: 8/29/2024  Treatment plan discussed with: patient  Plan details: HEP development and progression, monitor patients  adherence to activity modification to ensure adequate healing time/ recovery. Implement stretching, A/AA/PROM, joint mobilizations, posture education, STM/MI as needed to reduce muscle tension, muscle reeducation. Progress strengthening for return to ADLs. PLOC discussed and agreed upon with patient.         Subjective Evaluation    History of Present Illness  Mechanism of injury: 6/6/2024: Pt states that he has limited cervical range of motion limiting him from rotating his head. He states that the muscle between his neck and shoulder appears to be swollen. Denies mechanism of injury. Stiffness and pain began greater than 6 months ago. He does have numbness and tingling along muscle from R side of neck to shoulder. When asked by PT, he states that he does have tingling into bilateral UE which goes into the fingers starting approximately 3 months ago; pt unable to recall when this started. Pt denies numbness and tingling in BLE. He suspects shoulder involvement. He has difficulty reaching high or keeping arm above his head. He has difficulty driving due to difficulty turning; he feels increase in pain with 2.5 hours of driving. He has difficulty keeping his head up for a while. A typical day consists of walking the dog, moving into his new home (moving furniture/ boxes),and house maintenance tasks including power washing. Pt has a hx of shoulder surgery bilaterally 25 years ago. Pt has hx of diabetes and HTN which is being treat by cardiologist.     7/11/2024: Pt states that lack of PT attendance has been due to being busy with other doctors visits for himself and his spouse. At his last MD visit 6/17/2024 they suggested pt continue with PT for additional 4-6 weeks and if no improvement, to return to MD. Pt states that the sensation his R neck/ shoulder is soreness not necessarily pain. It is constant. It is always tight. His last MRI was in the 80's around the time of his shoulder surgery. He does not feel his pain  "or neck ROM has improved since starting PT. He does states that for the past 10 days he has not been keeping up with HEP.     Patient Goals  Patient goals for therapy: increased motion and decreased pain  Patient goal: improve mobility in neck  Pain  Current pain ratin  At best pain ratin  At worst pain ratin  Location: R upper trap ache; R shoulder pain  Quality: dull ache (Soreness)    Hand dominance: right      Diagnostic Tests  No diagnostic tests performed  Treatments  Current treatment: physical therapy      Objective  Objective:   Cervical Screen: cervical AROM limitations    2024  Flexion  Nil  Nil  Extension Max*  Max*(strain)  R rotation Max*  Max  L rotation Mod*  Mod  R sidebend Mod*  Mod   L sidebend Mod  Mod  Retraction Mod*  Max    AROM: standing  R   L  R      2024  Shoulder flexion  122*(shoulder) 135*(R UT) 140  Shoulder abduction  115*   145  140  Shoulder ER@90  70*   75  75  Shoulder IR   L1   T11  T12    STRENGTH:    R   L  R  L      2024     Shoulder flexion  5/5   4/5  4+/5  5/5    Shoulder abduction  4/5   5/5  4+/5  5/5  Shoulder ER at trunk  4+/5   3+/5  4/5  4/5  Shoulder IR at trunk  4+/5   4-/5  4/5  4/5  Middle trap (standing)  4/5   4/5  NT  NT  Lower trap/ latissimus  NT   NT  NT  NT  *pt unable to lie prone for periscapular strength testing    Posture:   2024 Pt's sitting posture is poor. Forward head posture. Shoulders are anteriorly rounded.   2024: Unchanged. Forward head posture. Shoulders are anteriorly rounded.     Mechanical assessment:  2024 pre-test symptoms = 5/10 R UT pain; numbness and tingling present in bilateral UE.   Repeated cervical retraction 5\" x 10 = increase, no worse  Repeated cervical flexion 1 x 10 = increase, no worse   Repeated cervical extension 1 x 10 = NE  Repeated cervical and thoracic extension = increase, worse  :   NT    Special Tests: " "  6/6/2024    Empty can R (+ for pain), L (- for pain)  7/11/2024:   NT    Palpation  6/6/2024:    R UT = TTP from occiput along length of UT to acromion process   L UT = Denies TTP   Central cervical spine = denies pain w/ palpation to spinous process to level of C7   Screen for scapular dyskinesis = R: positive for mild to moderate scapular winging with shoulder abduction, L: no scapular winging noted with shoulder abduction  7/11/2024:   NT     Precautions:   Past Medical History:   Diagnosis Date   • Arthritis    • Diabetes mellitus (HCC)    • Hypertension    • Kidney stone      Past Surgical History:   Procedure Laterality Date   • CHOLECYSTECTOMY     • HERNIA REPAIR     • SHOULDER SURGERY Bilateral    • TONSILLECTOMY         SOC: 6/6/2024  FOTO: 6/6/2024  POC Expiration: 8/29/2024  Daily Treatment Log  Date: 6/24/2024 7/1/2024 7/11/2024 Next session    Visit#/ auth: 6 7  8  RE     Objective Measures         Mechanical assessment         Vitals         Manuals           IASTM B/L cerv                                            Neuro Re-Ed  10' 15'  15'     Std UBE  3' retro  3' retro       Scapular retraction        Scap set w/ ER   1x10  1x10       Low row Grn tubing 2x10  [place] 6# 2x10  [place] 6# 2x10       Shoulder ext Grn tubing 2x10  [place] 6# 2x10  [place] 6# 2x10      ER/IR cables (L shoulder)    juan mig[place] 2# 2x10 ea                            Ther Ex 25' 20'  25'     Cervical extension W/ towel 2x10  W/ towel 2x10  1 x 10 AROM     Seated thoracic ext over MB  1x10  1x10  1x10 no MB     UT stretch        Cervical rotation w/ towel for AAROM W/ self OP on chin 5\"x10 R/L  W/ self OP on chin 5\"x10 R/L   1 x 10 R/L AROM             Supine cane flex        Pec stretch  20\"x3 corner stretch  20\"x3 corner stretch  30\"x3 corner stretch      SL open books (modified) 1x10 R/L std   1x10 R/L std     wall slides 3\"x10  3\"x10  3\"x10      table slide abd         Shoulder IR w/ cane 1x10 B/L   1x10 B/L                     "          Objective measures: AROM and MMT      KE     EDUCATION: Pathology, review of impairements, prognosis, activity modification, POC, and HEP   KE     Ther Activity                                   Gait Training                                   Modalities                                   HEP:   Access Code: [email or web address]  URL: [email or web address]  Date: 07/11/2024  Prepared by: Kelly Bob    Program Notes  Do not push through pain    Exercises  - Seated Scapular Retraction  - 2 x daily - 1 sets - 10 reps  - Standing shoulder flexion wall slides  - 2 x daily - 1 sets - 10 reps  - Mid-Lower Cervical Extension SNAG with Strap  - 1 x daily - 7 x weekly - 2 sets - 10 reps  - Seated Cervical Rotation AROM  - 1 x daily - 1 sets - 10 reps  - Seated Thoracic Lumbar Extension  - 1 x daily - 1 sets - 10 reps  - Standing Bilateral Low Shoulder Row with Anchored Resistance  - 1 x daily - 1 sets - 10 reps  - Shoulder extension with resistance - Neutral  - 1 x daily - 1 sets - 10 reps  - Standing Thoracic Open Book at Wall  - 1 x daily - 1 sets - 10 reps  - Standing Bilateral Shoulder Internal Rotation AAROM with Dowel  - 1 x daily - 1 sets - 10 reps

## 2024-07-15 ENCOUNTER — OFFICE VISIT (OUTPATIENT)
Dept: PHYSICAL THERAPY | Facility: CLINIC | Age: 74
End: 2024-07-15
Payer: MEDICARE

## 2024-07-15 DIAGNOSIS — M43.6 STIFFNESS OF CERVICAL SPINE: ICD-10-CM

## 2024-07-15 DIAGNOSIS — R20.0 NUMBNESS AND TINGLING OF BOTH UPPER EXTREMITIES: ICD-10-CM

## 2024-07-15 DIAGNOSIS — R20.2 NUMBNESS AND TINGLING OF BOTH UPPER EXTREMITIES: ICD-10-CM

## 2024-07-15 DIAGNOSIS — M54.2 CERVICAL PAIN (NECK): Primary | ICD-10-CM

## 2024-07-15 DIAGNOSIS — M25.511 RIGHT SHOULDER PAIN, UNSPECIFIED CHRONICITY: ICD-10-CM

## 2024-07-15 PROCEDURE — 97110 THERAPEUTIC EXERCISES: CPT

## 2024-07-15 PROCEDURE — 97112 NEUROMUSCULAR REEDUCATION: CPT

## 2024-07-15 NOTE — PROGRESS NOTES
Daily Note     Today's date: 7/15/2024  Patient name: Donovan Burnette  : 1950  MRN: [id number]  Referring provider: Luis Miguel Buchanan*  Dx:   Encounter Diagnosis     ICD-10-CM    1. Cervical pain (neck)  M54.2       2. Numbness and tingling of both upper extremities  R20.0     R20.2       3. Stiffness of cervical spine  M43.6       4. Right shoulder pain, unspecified chronicity  M25.511                      Subjective: Pt states that pain is a little lower today than normal; rates pain as 8/10. States that he has been consistent with his HEP.       Objective: See treatment diary below      Assessment: Informed pt that he reported greater pain today on arrival to PT than previous session. Instructed pt to contact MD to inform him of continued pain. Pt denies increase in R neck/ shoulder pain with exercises implemented today. Pt demonstrates improved form with low row and shoulder extension today. Pt does report R shoulder fatigue at completion of session. Tolerated treatment well. Patient would benefit from continued PT.       Plan: Continue R shoulder strengthening to prevent compensatory R upper trap involvement with functional activities. Continue per plan of care.  Progress treatment as tolerated.       Precautions:   Past Medical History:   Diagnosis Date    Arthritis     Diabetes mellitus (HCC)     Hypertension     Kidney stone      Past Surgical History:   Procedure Laterality Date    CHOLECYSTECTOMY      HERNIA REPAIR      SHOULDER SURGERY Bilateral     TONSILLECTOMY         SOC: 2024  FOTO: 2024  POC Expiration: 2024  Daily Treatment Log  Date: 2024 2024 2024 7/15/2024 Next session   Visit#/ auth: 6 7  8  RE 9       Objective Measures         Mechanical assessment         Vitals         Manuals           IASTM B/L cerv                                            Neuro Re-Ed  10' 15'  15' 20'    Std UBE  3' retro  3' retro       Scapular retraction        Scap set w/ ER   " 1x10  1x10  2x10 B/L RTB     Low row Grn tubing 2x10  [place] 6# 2x10  Juan Miguel 6# 2x10  3 x 10 blue tubing     Shoulder ext Grn tubing 2x10  [place] 6# 2x10  [place] 6# 2x10  Blue tubing 3x10     ER/IR cables (L shoulder)    juan miguel 2# 2x10 ea   juan miguel 2# 2x10 ea R                         Ther Ex 25' 20'  25' 25'    Pulleys    3'    Cervical extension W/ towel 2x10  W/ towel 2x10  1 x 10 AROM 1 x 10 AROM    Seated thoracic ext over MB  1x10  1x10  1x10 no MB     UT stretch        Cervical rotation w/ towel for AAROM W/ self OP on chin 5\"x10 R/L  W/ self OP on chin 5\"x10 R/L   1 x 10 R/L AROM             Supine cane flex        Pec stretch  20\"x3 corner stretch  20\"x3 corner stretch  30\"x3 corner stretch  30\"x3 corner stretch     SL open books (modified) 1x10 R/L std   1x10 R/L std 1x10 R/L std    wall slides 3\"x10  3\"x10  3\"x10  3\"x10, 2x    table slide abd         Shoulder IR w/ cane 1x10 B/L   1x10 B/L 2x10 B/L    Shd abd w/ cane        2 x 10 B/L                 Objective measures: AROM and MMT      KE     EDUCATION: Pathology, review of impairements, prognosis, activity modification, POC, and HEP   KE     Ther Activity                                   Gait Training                                   Modalities                                   HEP:   Access Code: [email or web address]  URL: [email or web address]  Date: 07/11/2024  Prepared by: Kelly Bob    Program Notes  Do not push through pain    Exercises  - Seated Scapular Retraction  - 2 x daily - 1 sets - 10 reps  - Standing shoulder flexion wall slides  - 2 x daily - 1 sets - 10 reps  - Mid-Lower Cervical Extension SNAG with Strap  - 1 x daily - 7 x weekly - 2 sets - 10 reps  - Seated Cervical Rotation AROM  - 1 x daily - 1 sets - 10 reps  - Seated Thoracic Lumbar Extension  - 1 x daily - 1 sets - 10 reps  - Standing Bilateral Low Shoulder Row with Anchored Resistance  - 1 x daily - 1 sets - 10 reps  - Shoulder extension with resistance - Neutral  - 1 x " daily - 1 sets - 10 reps  - Standing Thoracic Open Book at Wall  - 1 x daily - 1 sets - 10 reps  - Standing Bilateral Shoulder Internal Rotation AAROM with Dowel  - 1 x daily - 1 sets - 10 reps

## 2024-07-18 ENCOUNTER — OFFICE VISIT (OUTPATIENT)
Dept: PHYSICAL THERAPY | Facility: CLINIC | Age: 74
End: 2024-07-18
Payer: MEDICARE

## 2024-07-18 DIAGNOSIS — R20.2 NUMBNESS AND TINGLING OF BOTH UPPER EXTREMITIES: ICD-10-CM

## 2024-07-18 DIAGNOSIS — M54.2 CERVICAL PAIN (NECK): Primary | ICD-10-CM

## 2024-07-18 DIAGNOSIS — R20.0 NUMBNESS AND TINGLING OF BOTH UPPER EXTREMITIES: ICD-10-CM

## 2024-07-18 DIAGNOSIS — M25.511 RIGHT SHOULDER PAIN, UNSPECIFIED CHRONICITY: ICD-10-CM

## 2024-07-18 DIAGNOSIS — M43.6 STIFFNESS OF CERVICAL SPINE: ICD-10-CM

## 2024-07-18 PROCEDURE — 97112 NEUROMUSCULAR REEDUCATION: CPT

## 2024-07-18 PROCEDURE — 97110 THERAPEUTIC EXERCISES: CPT

## 2024-07-18 NOTE — PROGRESS NOTES
Daily Note     Today's date: 2024  Patient name: Donovan Burnette  : 1950  MRN: [id number]  Referring provider: Luis Miguel Buchanan*  Dx:   Encounter Diagnosis     ICD-10-CM    1. Cervical pain (neck)  M54.2       2. Numbness and tingling of both upper extremities  R20.0     R20.2       3. Stiffness of cervical spine  M43.6       4. Right shoulder pain, unspecified chronicity  M25.511                      Subjective: pt reports status quo on arrival to session, nothing worse nothing better.       Objective: See treatment diary below      Assessment: Tolerated treatment well. Pt dem good tolerance to AAROM however does cont with ERP and limited progression due to shoulder pain. Pt does cont with dec cerv ROM which makes visibility difficult when driving. Patient would benefit from continued PT      Plan: Continue per plan of care.      Precautions:   Past Medical History:   Diagnosis Date    Arthritis     Diabetes mellitus (HCC)     Hypertension     Kidney stone      Past Surgical History:   Procedure Laterality Date    CHOLECYSTECTOMY      HERNIA REPAIR      SHOULDER SURGERY Bilateral     TONSILLECTOMY         SOC: 2024  FOTO: 2024  POC Expiration: 2024  Daily Treatment Log  Date: 2024 2024 2024 7/15/2024 2024   Visit#/ auth: 6 7  8  RE 9    10   Objective Measures         Mechanical assessment         Vitals         Manuals           IASTM B/L cerv                                            Neuro Re-Ed  10' 15'  15' 20'    Std UBE  3' retro  3' retro       Scapular retraction        Scap set w/ ER   1x10  1x10  2x10 B/L RTB RTB 2x10     Low row Grn tubing 2x10  [place] 6# 2x10  [place] 6# 2x10  3 x 10 blue tubing Blk tubing 2x10     Shoulder ext Grn tubing 2x10  [place] 6# 2x10  [place] 6# 2x10  Blue tubing 3x10  Blk tubing 2x10    ER/IR cables (L shoulder)    juan miguel 2# 2x10 ea   juan miguel 2# 2x10 ea R Dlb yellow tubing AROM IR 2x10 R/L                         Ther Ex 25' 20'  " 25' 25'    Pulleys    3' 3'   Cervical extension W/ towel 2x10  W/ towel 2x10  1 x 10 AROM 1 x 10 AROM 1x10 AROM    Seated thoracic ext over MB  1x10  1x10  1x10 no MB  1x10    UT stretch        Cervical rotation w/ towel for AAROM W/ self OP on chin 5\"x10 R/L  W/ self OP on chin 5\"x10 R/L   1 x 10 R/L AROM  W/ self OP on chin 5\"x10 R/L            Supine cane flex        Pec stretch  20\"x3 corner stretch  20\"x3 corner stretch  30\"x3 corner stretch  30\"x3 corner stretch  30\"x3 corner stretch     SL open books (modified) 1x10 R/L std   1x10 R/L std 1x10 R/L std 1x10 R/L std    wall slides 3\"x10  3\"x10  3\"x10  3\"x10, 2x 3\"x10;2x    table slide abd         Shoulder IR w/ cane 1x10 B/L   1x10 B/L 2x10 B/L 2x10 B/L    Shd abd w/ cane        2 x 10 B/L 2x10 R/L                 Objective measures: AROM and MMT      KE     EDUCATION: Pathology, review of impairements, prognosis, activity modification, POC, and HEP   KE     Ther Activity                                   Gait Training                                   Modalities                                   HEP:   Access Code: [email or web address]  URL: [email or web address]  Date: 07/11/2024  Prepared by: Kelly Bob    Program Notes  Do not push through pain    Exercises  - Seated Scapular Retraction  - 2 x daily - 1 sets - 10 reps  - Standing shoulder flexion wall slides  - 2 x daily - 1 sets - 10 reps  - Mid-Lower Cervical Extension SNAG with Strap  - 1 x daily - 7 x weekly - 2 sets - 10 reps  - Seated Cervical Rotation AROM  - 1 x daily - 1 sets - 10 reps  - Seated Thoracic Lumbar Extension  - 1 x daily - 1 sets - 10 reps  - Standing Bilateral Low Shoulder Row with Anchored Resistance  - 1 x daily - 1 sets - 10 reps  - Shoulder extension with resistance - Neutral  - 1 x daily - 1 sets - 10 reps  - Standing Thoracic Open Book at Wall  - 1 x daily - 1 sets - 10 reps  - Standing Bilateral Shoulder Internal Rotation AAROM with Dowel  - 1 x daily - 1 sets - 10 " reps

## 2024-07-22 ENCOUNTER — OFFICE VISIT (OUTPATIENT)
Dept: PHYSICAL THERAPY | Facility: CLINIC | Age: 74
End: 2024-07-22
Payer: MEDICARE

## 2024-07-22 DIAGNOSIS — M54.2 CERVICAL PAIN (NECK): Primary | ICD-10-CM

## 2024-07-22 DIAGNOSIS — R20.2 NUMBNESS AND TINGLING OF BOTH UPPER EXTREMITIES: ICD-10-CM

## 2024-07-22 DIAGNOSIS — M25.511 RIGHT SHOULDER PAIN, UNSPECIFIED CHRONICITY: ICD-10-CM

## 2024-07-22 DIAGNOSIS — R20.0 NUMBNESS AND TINGLING OF BOTH UPPER EXTREMITIES: ICD-10-CM

## 2024-07-22 DIAGNOSIS — M43.6 STIFFNESS OF CERVICAL SPINE: ICD-10-CM

## 2024-07-22 PROCEDURE — 97110 THERAPEUTIC EXERCISES: CPT

## 2024-07-22 PROCEDURE — 97112 NEUROMUSCULAR REEDUCATION: CPT

## 2024-07-22 NOTE — PROGRESS NOTES
Daily Note     Today's date: 2024  Patient name: Donovan Burnette  : 1950  MRN: [id number]  Referring provider: Luis Miguel Buchanan*  Dx:   Encounter Diagnosis     ICD-10-CM    1. Cervical pain (neck)  M54.2       2. Numbness and tingling of both upper extremities  R20.0     R20.2       3. Stiffness of cervical spine  M43.6       4. Right shoulder pain, unspecified chronicity  M25.511                      Subjective: pt reports status quo with his neck and shoulder with nothing new to report. His LB is bothering him today.   Pt arrived 15 min late to session due to mixing up his times on his calendar.       Objective: See treatment diary below      Assessment: Tolerated treatment well. Pt has no noted increased pain post session, he does report some stiffness. Patient would benefit from continued PT      Plan: Continue per plan of care.      Precautions:   Past Medical History:   Diagnosis Date    Arthritis     Diabetes mellitus (HCC)     Hypertension     Kidney stone      Past Surgical History:   Procedure Laterality Date    CHOLECYSTECTOMY      HERNIA REPAIR      SHOULDER SURGERY Bilateral     TONSILLECTOMY         SOC: 2024  FOTO: 2024  POC Expiration: 2024  Daily Treatment Log  Date: 2024  2024 7/15/2024 2024   Visit#/ auth: 11  8  RE 9    10   Objective Measures         Mechanical assessment         Vitals         Manuals           IASTM B/L cerv                                            Neuro Re-Ed  15'  15' 20'    Std UBE  3' retro        Scapular retraction        Scap set w/ ER    1x10  2x10 B/L RTB RTB 2x10     Low row Blk tubing 2x10   Juan Miguel 6# 2x10  3 x 10 blue tubing Blk tubing 2x10     Shoulder ext blk tubing 2x10   Juan Miguel 6# 2x10  Blue tubing 3x10  Blk tubing 2x10    ER/IR cables (L shoulder)  Dlb yellow tubing AROM IR/ER 2x10 R/L     juan miguel 2# 2x10 ea R Dlb yellow tubing AROM IR 2x10 R/L                         Ther Ex 15'  25' 25'    Pulleys 3'   3' 3'  "  Cervical extension AROM 1x10   1 x 10 AROM 1 x 10 AROM 1x10 AROM    Seated thoracic ext over MB  1x10   1x10 no MB  1x10    UT stretch        Cervical rotation w/ towel for AAROM W/ self OP on chin 5\"x10 R/L   1 x 10 R/L AROM  W/ self OP on chin 5\"x10 R/L            Supine cane flex        Pec stretch  20\"x3 corner stretch   30\"x3 corner stretch  30\"x3 corner stretch  30\"x3 corner stretch     SL open books (modified) 1x10 R/L std   1x10 R/L std 1x10 R/L std 1x10 R/L std    wall slides 3\"x10   3\"x10  3\"x10, 2x 3\"x10;2x    table slide abd         Shoulder IR w/ cane 1x15 B/L   1x10 B/L 2x10 B/L 2x10 B/L    Shd abd w/ cane   1x15 R/L      2 x 10 B/L 2x10 R/L                 Objective measures: AROM and MMT      KE     EDUCATION: Pathology, review of impairements, prognosis, activity modification, POC, and HEP   KE     Ther Activity                                   Gait Training                                   Modalities                                   HEP:   Access Code: [email or web address]  URL: [email or web address]  Date: 07/11/2024  Prepared by: Kelly Bob    Program Notes  Do not push through pain    Exercises  - Seated Scapular Retraction  - 2 x daily - 1 sets - 10 reps  - Standing shoulder flexion wall slides  - 2 x daily - 1 sets - 10 reps  - Mid-Lower Cervical Extension SNAG with Strap  - 1 x daily - 7 x weekly - 2 sets - 10 reps  - Seated Cervical Rotation AROM  - 1 x daily - 1 sets - 10 reps  - Seated Thoracic Lumbar Extension  - 1 x daily - 1 sets - 10 reps  - Standing Bilateral Low Shoulder Row with Anchored Resistance  - 1 x daily - 1 sets - 10 reps  - Shoulder extension with resistance - Neutral  - 1 x daily - 1 sets - 10 reps  - Standing Thoracic Open Book at Wall  - 1 x daily - 1 sets - 10 reps  - Standing Bilateral Shoulder Internal Rotation AAROM with Dowel  - 1 x daily - 1 sets - 10 reps           "

## 2024-07-24 ENCOUNTER — TELEPHONE (OUTPATIENT)
Dept: PHYSICAL THERAPY | Facility: CLINIC | Age: 74
End: 2024-07-24

## 2024-07-24 NOTE — TELEPHONE ENCOUNTER
"Pt left internal message to cx his remaining appts; \"will continue PT after next visit with primary care and maybe orthopedic doctor. Thanks\"   "

## 2024-07-26 ENCOUNTER — APPOINTMENT (OUTPATIENT)
Dept: PHYSICAL THERAPY | Facility: CLINIC | Age: 74
End: 2024-07-26
Payer: MEDICARE

## 2024-07-29 ENCOUNTER — APPOINTMENT (OUTPATIENT)
Dept: PHYSICAL THERAPY | Facility: CLINIC | Age: 74
End: 2024-07-29
Payer: MEDICARE

## 2024-08-29 ENCOUNTER — TELEPHONE (OUTPATIENT)
Dept: PHYSICAL THERAPY | Facility: CLINIC | Age: 74
End: 2024-08-29

## 2024-08-29 NOTE — TELEPHONE ENCOUNTER
"Called pt to check on status as in his internal message 7/24/24 he stated he \"will continue PT after next visit w/ PCP and maybe orthopedic MD.\" Left message asking pt to call back to discuss status/ whether or not he would like to resume PT.     Kelly Bob, PT, DPT  "

## 2024-09-20 ENCOUNTER — RA CDI HCC (OUTPATIENT)
Dept: OTHER | Facility: HOSPITAL | Age: 74
End: 2024-09-20

## 2024-09-20 NOTE — PROGRESS NOTES
E11.22   HCC coding opportunities          Chart Reviewed number of suggestions sent to Provider: 1     Patients Insurance     Medicare Insurance: Medicare           [id number]

## 2024-10-07 ENCOUNTER — OFFICE VISIT (OUTPATIENT)
Dept: FAMILY MEDICINE CLINIC | Facility: CLINIC | Age: 74
End: 2024-10-07
Payer: MEDICARE

## 2024-10-07 VITALS
HEART RATE: 78 BPM | TEMPERATURE: 97.7 F | DIASTOLIC BLOOD PRESSURE: 70 MMHG | BODY MASS INDEX: 37.46 KG/M2 | SYSTOLIC BLOOD PRESSURE: 132 MMHG | WEIGHT: 282.6 LBS | RESPIRATION RATE: 18 BRPM | OXYGEN SATURATION: 97 % | HEIGHT: 73 IN

## 2024-10-07 DIAGNOSIS — R55 SYNCOPE, UNSPECIFIED SYNCOPE TYPE: ICD-10-CM

## 2024-10-07 DIAGNOSIS — E11.9 TYPE 2 DIABETES MELLITUS WITHOUT COMPLICATION, WITH LONG-TERM CURRENT USE OF INSULIN (HCC): ICD-10-CM

## 2024-10-07 DIAGNOSIS — E66.01 OBESITY, MORBID (HCC): ICD-10-CM

## 2024-10-07 DIAGNOSIS — G25.81 RESTLESS LEG SYNDROME: ICD-10-CM

## 2024-10-07 DIAGNOSIS — N18.31 TYPE 2 DIABETES MELLITUS WITH STAGE 3A CHRONIC KIDNEY DISEASE, WITH LONG-TERM CURRENT USE OF INSULIN (HCC): Primary | ICD-10-CM

## 2024-10-07 DIAGNOSIS — D69.6 PLATELETS DECREASED (HCC): ICD-10-CM

## 2024-10-07 DIAGNOSIS — N18.31 STAGE 3A CHRONIC KIDNEY DISEASE (HCC): ICD-10-CM

## 2024-10-07 DIAGNOSIS — Z79.4 TYPE 2 DIABETES MELLITUS WITH STAGE 3A CHRONIC KIDNEY DISEASE, WITH LONG-TERM CURRENT USE OF INSULIN (HCC): Primary | ICD-10-CM

## 2024-10-07 DIAGNOSIS — Z79.4 TYPE 2 DIABETES MELLITUS WITHOUT COMPLICATION, WITH LONG-TERM CURRENT USE OF INSULIN (HCC): ICD-10-CM

## 2024-10-07 DIAGNOSIS — I10 PRIMARY HYPERTENSION: ICD-10-CM

## 2024-10-07 DIAGNOSIS — E11.22 TYPE 2 DIABETES MELLITUS WITH STAGE 3A CHRONIC KIDNEY DISEASE, WITH LONG-TERM CURRENT USE OF INSULIN (HCC): Primary | ICD-10-CM

## 2024-10-07 LAB — SL AMB POCT HEMOGLOBIN AIC: 8.9 (ref ?–6.5)

## 2024-10-07 PROCEDURE — 99214 OFFICE O/P EST MOD 30 MIN: CPT | Performed by: STUDENT IN AN ORGANIZED HEALTH CARE EDUCATION/TRAINING PROGRAM

## 2024-10-07 PROCEDURE — 83036 HEMOGLOBIN GLYCOSYLATED A1C: CPT | Performed by: STUDENT IN AN ORGANIZED HEALTH CARE EDUCATION/TRAINING PROGRAM

## 2024-10-07 RX ORDER — SEMAGLUTIDE 0.68 MG/ML
INJECTION, SOLUTION SUBCUTANEOUS
COMMUNITY
Start: 2024-09-21

## 2024-10-07 RX ORDER — ROPINIROLE 0.25 MG/1
0.25 TABLET, FILM COATED ORAL
Qty: 30 TABLET | Refills: 0 | Status: SHIPPED | OUTPATIENT
Start: 2024-10-07

## 2024-10-07 RX ORDER — INSULIN LISPRO 100 U/ML
INJECTION, SOLUTION SUBCUTANEOUS
COMMUNITY
Start: 2024-09-11

## 2024-10-07 RX ORDER — OMEGA-3S/DHA/EPA/FISH OIL/D3 300MG-1000
400 CAPSULE ORAL DAILY
COMMUNITY

## 2024-10-07 NOTE — PROGRESS NOTES
Clinic Visit Note  Donovan Burnette 74 y.o. male   MRN: [id number]    Assessment and Plan      Diagnoses and all orders for this visit:    Type 2 diabetes mellitus with stage 3a chronic kidney disease, with long-term current use of insulin (HCC)  -     Ambulatory Referral to Endocrinology; Future    Primary hypertension    Stage 3a chronic kidney disease (HCC)    Platelets decreased (HCC)    Obesity, morbid (HCC)    Syncope, unspecified syncope type    Restless leg syndrome  -     rOPINIRole (REQUIP) 0.25 mg tablet; Take 1 tablet (0.25 mg total) by mouth daily at bedtime    Type 2 diabetes mellitus without complication, with long-term current use of insulin (HCC)  -     POCT hemoglobin A1c    Other orders  -     Ozempic, 0.25 or 0.5 MG/DOSE, 2 MG/3ML injection pen  -     Admelog SoloStar 100 units/mL injection pen  -     cholecalciferol (VITAMIN D3) 400 units tablet; Take 400 Units by mouth daily      Patient is a pleasant 74-year-old male presenting for follow-up, hemoglobin A1c checkup.  Patient was currently off Mounjaro and is now restarted on Ozempic, hemoglobin A1c of 8.9%.  Per patient, cardiologist has been following diabetes marker, obtain records.  Recommend endocrinology evaluation given high dose Lantus twice daily with mealtime insulin.  Continue to uptitrate Ozempic, endocrinology evaluation appreciated for appropriate management.  Lifestyle modifications discussed including low added sugar diet, low processed foods, increased activity as tolerated.  Patient notes restless leg syndrome, trial low-dose Requip to see if this helps symptoms, stretching/strengthening exercises of lower extremities recommended prior to bedtime.  3-month follow-up recommended.    My impressions and treatment recommendations were discussed in detail with the patient who verbalized understanding and had no further questions.  Discharge instructions were provided.    Subjective     Chief Complaint: F/U    History of Present  Illness:    Restless leg syndrome at night  Taking insulin appropriately, Lantus 80 Units twice daily, 10 units with meals  Right shoulder pain    The following portions of the patient's history were reviewed and updated as appropriate: allergies, current medications, past family history, past medical history, past social history, past surgical history and problem list.    REVIEW OF SYSTEMS:  A complete 12-point review of systems is negative other than that noted in the HPI.    Review of Systems   Constitutional:  Negative for chills, fatigue and fever.   HENT:  Negative for congestion and sore throat.    Eyes:  Negative for pain and visual disturbance.   Respiratory:  Negative for shortness of breath and wheezing.    Cardiovascular:  Negative for chest pain and palpitations.   Gastrointestinal:  Negative for abdominal pain, constipation, diarrhea, nausea and vomiting.   Genitourinary:  Negative for dysuria and frequency.   Musculoskeletal:  Negative for back pain and neck pain.   Skin:  Negative for color change and rash.   Neurological:  Negative for dizziness and headaches.   Psychiatric/Behavioral:  Negative for agitation and confusion.    All other systems reviewed and are negative.        Current Outpatient Medications:     Admelog SoloStar 100 units/mL injection pen, , Disp: , Rfl:     atorvastatin (LIPITOR) 80 mg tablet, Take 40 mg by mouth daily, Disp: , Rfl:     BD Pen Needle Bernice 2nd Gen 32G X 4 MM MISC, use 1 PEN NEEDLE to inject MEDICATION subcutaneously four times a day, Disp: , Rfl:     cholecalciferol (VITAMIN D3) 400 units tablet, Take 400 Units by mouth daily, Disp: , Rfl:     gabapentin (NEURONTIN) 300 mg capsule, Take 300 mg by mouth 2 (two) times a day, Disp: , Rfl:     glipiZIDE (GLUCOTROL) 10 mg tablet, Take 10 mg by mouth 2 (two) times a day before meals, Disp: , Rfl:     hydrochlorothiazide (HYDRODIURIL) 25 mg tablet, Take 25 mg by mouth daily, Disp: , Rfl:     Lantus SoloStar 100 units/mL  SOPN, ADMINISTER 80 UNITS UNDER THE SKIN TWICE DAILY, Disp: , Rfl:     metFORMIN (GLUCOPHAGE) 1000 MG tablet, Take 1,000 mg by mouth 2 (two) times a day with meals, Disp: , Rfl:     Ozempic, 0.25 or 0.5 MG/DOSE, 2 MG/3ML injection pen, , Disp: , Rfl:     rOPINIRole (REQUIP) 0.25 mg tablet, Take 1 tablet (0.25 mg total) by mouth daily at bedtime, Disp: 30 tablet, Rfl: 0    valsartan (DIOVAN) 320 MG tablet, Take 320 mg by mouth daily, Disp: , Rfl:     zolpidem (AMBIEN CR) 12.5 MG CR tablet, Take 10 mg by mouth daily at bedtime as needed for sleep (Patient not taking: Reported on 10/7/2024), Disp: , Rfl:   Past Medical History:   Diagnosis Date    Arthritis     Diabetes mellitus (HCC)     Hypertension     Kidney stone      Past Surgical History:   Procedure Laterality Date    CHOLECYSTECTOMY      HERNIA REPAIR      SHOULDER SURGERY Bilateral     TONSILLECTOMY       Social History     Socioeconomic History    Marital status: /Civil Union     Spouse name: Not on file    Number of children: Not on file    Years of education: Not on file    Highest education level: Not on file   Occupational History    Not on file   Tobacco Use    Smoking status: Former     Types: Cigarettes     Start date: 1970    Smokeless tobacco: Never   Vaping Use    Vaping status: Never Used   Substance and Sexual Activity    Alcohol use: Yes     Alcohol/week: 2.0 standard drinks of alcohol     Types: 1 Glasses of wine, 1 Cans of beer per week     Comment: occasional    Drug use: Never    Sexual activity: Not on file   Other Topics Concern    Not on file   Social History Narrative    Not on file     Social Determinants of Health     Financial Resource Strain: Not on file   Food Insecurity: No Food Insecurity (6/17/2024)    Hunger Vital Sign     Worried About Running Out of Food in the Last Year: Never true     Ran Out of Food in the Last Year: Never true   Transportation Needs: No Transportation Needs (6/17/2024)    PRAPARE - Transportation     " Lack of Transportation (Medical): No     Lack of Transportation (Non-Medical): No   Physical Activity: Not on file   Stress: Not on file   Social Connections: Not on file   Intimate Partner Violence: Not on file   Housing Stability: Low Risk  (6/17/2024)    Housing Stability Vital Sign     Unable to Pay for Housing in the Last Year: No     Number of Times Moved in the Last Year: 1     Homeless in the Last Year: No     Family History   Problem Relation Age of Onset    Cancer Mother      No Known Allergies    Objective     Vitals:    10/07/24 1141   BP: 132/70   BP Location: Left arm   Patient Position: Sitting   Cuff Size: Large   Pulse: 78   Resp: 18   Temp: 97.7 °F (36.5 °C)   TempSrc: Temporal   SpO2: 97%   Weight: 128 kg (282 lb 9.6 oz)   Height: 6' 1\" (1.854 m)       Physical Exam:     GENERAL: NAD, pleasant   HEENT:  NC/AT, PERRL, EOMI, no scleral icterus  CARDIAC:  RRR, +S1/S2, no S3/S4 appreciated, no m/g/r  PULMONARY:  CTA B/L, no wheezing/rales/rhonci, non-labored breathing  ABDOMEN:  Soft, NT/ND, no rebound/guarding/rigidity  Extremities:. No edema, cyanosis, or clubbing  Musculoskeletal:  Full range of motion intact in all extremities   NEUROLOGIC: Grossly intact, no focal deficits  SKIN:  No rashes or erythema noted on exposed skin  Psych: Normal affect, mood stable    ==  PLEASE NOTE:  This encounter was completed utilizing the [employer] Direct Speech Voice Recognition Software. Grammatical errors, random word insertions, pronoun errors and incomplete sentences are occasional consequences of the system due to software limitations, ambient noise and hardware issues.These may be missed by proof reading prior to affixing electronic signature. Any questions or concerns about the content, text or information contained within the body of this dictation should be directly addressed to the physician for clarification. Please do not hesitate to call me directly if you have any any questions or " concerns.    Luis Miguel Buchanan DO  [facility]   [facility]

## 2024-10-30 DIAGNOSIS — G25.81 RESTLESS LEG SYNDROME: ICD-10-CM

## 2024-10-30 RX ORDER — ROPINIROLE 0.25 MG/1
0.25 TABLET, FILM COATED ORAL
Qty: 30 TABLET | Refills: 0 | Status: SHIPPED | OUTPATIENT
Start: 2024-10-30

## 2024-11-27 DIAGNOSIS — G25.81 RESTLESS LEG SYNDROME: ICD-10-CM

## 2024-11-27 RX ORDER — ROPINIROLE 0.25 MG/1
0.25 TABLET, FILM COATED ORAL
Qty: 30 TABLET | Refills: 5 | Status: SHIPPED | OUTPATIENT
Start: 2024-11-27

## 2024-12-18 ENCOUNTER — APPOINTMENT (OUTPATIENT)
Dept: LAB | Facility: CLINIC | Age: 74
End: 2024-12-18
Payer: MEDICARE

## 2024-12-18 DIAGNOSIS — E11.22 TYPE 2 DIABETES MELLITUS WITH STAGE 3A CHRONIC KIDNEY DISEASE, WITH LONG-TERM CURRENT USE OF INSULIN (HCC): ICD-10-CM

## 2024-12-18 DIAGNOSIS — N18.31 TYPE 2 DIABETES MELLITUS WITH STAGE 3A CHRONIC KIDNEY DISEASE, WITH LONG-TERM CURRENT USE OF INSULIN (HCC): ICD-10-CM

## 2024-12-18 DIAGNOSIS — E55.9 VITAMIN D DEFICIENCY: ICD-10-CM

## 2024-12-18 DIAGNOSIS — R53.83 OTHER FATIGUE: ICD-10-CM

## 2024-12-18 DIAGNOSIS — Z79.4 TYPE 2 DIABETES MELLITUS WITH STAGE 3A CHRONIC KIDNEY DISEASE, WITH LONG-TERM CURRENT USE OF INSULIN (HCC): ICD-10-CM

## 2024-12-18 DIAGNOSIS — E53.8 B12 DEFICIENCY: Primary | ICD-10-CM

## 2024-12-18 DIAGNOSIS — E53.8 B12 DEFICIENCY: ICD-10-CM

## 2024-12-18 LAB
25(OH)D3 SERPL-MCNC: 48.7 NG/ML (ref 30–100)
ALBUMIN SERPL BCG-MCNC: 4.1 G/DL (ref 3.5–5)
ALP SERPL-CCNC: 82 U/L (ref 34–104)
ALT SERPL W P-5'-P-CCNC: 25 U/L (ref 7–52)
ANION GAP SERPL CALCULATED.3IONS-SCNC: 10 MMOL/L (ref 4–13)
AST SERPL W P-5'-P-CCNC: 29 U/L (ref 13–39)
BASOPHILS # BLD AUTO: 0.05 THOUSANDS/ÂΜL (ref 0–0.1)
BASOPHILS NFR BLD AUTO: 1 % (ref 0–1)
BILIRUB SERPL-MCNC: 0.56 MG/DL (ref 0.2–1)
BUN SERPL-MCNC: 27 MG/DL (ref 5–25)
CALCIUM SERPL-MCNC: 9.4 MG/DL (ref 8.4–10.2)
CHLORIDE SERPL-SCNC: 105 MMOL/L (ref 96–108)
CO2 SERPL-SCNC: 26 MMOL/L (ref 21–32)
CREAT SERPL-MCNC: 1.03 MG/DL (ref 0.6–1.3)
CREAT UR-MCNC: 115.3 MG/DL
EOSINOPHIL # BLD AUTO: 0.4 THOUSAND/ÂΜL (ref 0–0.61)
EOSINOPHIL NFR BLD AUTO: 4 % (ref 0–6)
ERYTHROCYTE [DISTWIDTH] IN BLOOD BY AUTOMATED COUNT: 13 % (ref 11.6–15.1)
FERRITIN SERPL-MCNC: 39 NG/ML (ref 24–336)
GFR SERPL CREATININE-BSD FRML MDRD: 71 ML/MIN/1.73SQ M
GLUCOSE P FAST SERPL-MCNC: 129 MG/DL (ref 65–99)
HCT VFR BLD AUTO: 39.1 % (ref 36.5–49.3)
HGB BLD-MCNC: 12.5 G/DL (ref 12–17)
IMM GRANULOCYTES # BLD AUTO: 0.03 THOUSAND/UL (ref 0–0.2)
IMM GRANULOCYTES NFR BLD AUTO: 0 % (ref 0–2)
IRON SATN MFR SERPL: 19 % (ref 15–50)
IRON SERPL-MCNC: 63 UG/DL (ref 50–212)
LYMPHOCYTES # BLD AUTO: 2.24 THOUSANDS/ÂΜL (ref 0.6–4.47)
LYMPHOCYTES NFR BLD AUTO: 22 % (ref 14–44)
MCH RBC QN AUTO: 28.5 PG (ref 26.8–34.3)
MCHC RBC AUTO-ENTMCNC: 32 G/DL (ref 31.4–37.4)
MCV RBC AUTO: 89 FL (ref 82–98)
MICROALBUMIN UR-MCNC: <7 MG/L
MONOCYTES # BLD AUTO: 0.61 THOUSAND/ÂΜL (ref 0.17–1.22)
MONOCYTES NFR BLD AUTO: 6 % (ref 4–12)
NEUTROPHILS # BLD AUTO: 6.93 THOUSANDS/ÂΜL (ref 1.85–7.62)
NEUTS SEG NFR BLD AUTO: 67 % (ref 43–75)
NRBC BLD AUTO-RTO: 0 /100 WBCS
PLATELET # BLD AUTO: 251 THOUSANDS/UL (ref 149–390)
PMV BLD AUTO: 10.4 FL (ref 8.9–12.7)
POTASSIUM SERPL-SCNC: 4.7 MMOL/L (ref 3.5–5.3)
PROT SERPL-MCNC: 7.1 G/DL (ref 6.4–8.4)
RBC # BLD AUTO: 4.39 MILLION/UL (ref 3.88–5.62)
SODIUM SERPL-SCNC: 141 MMOL/L (ref 135–147)
TESTOST SERPL-MSCNC: 154 NG/DL (ref 175–781)
TIBC SERPL-MCNC: 334.6 UG/DL (ref 250–450)
TRANSFERRIN SERPL-MCNC: 239 MG/DL (ref 203–362)
UIBC SERPL-MCNC: 272 UG/DL (ref 155–355)
VIT B12 SERPL-MCNC: 330 PG/ML (ref 180–914)
WBC # BLD AUTO: 10.26 THOUSAND/UL (ref 4.31–10.16)

## 2024-12-18 PROCEDURE — 82570 ASSAY OF URINE CREATININE: CPT

## 2024-12-18 PROCEDURE — 82043 UR ALBUMIN QUANTITATIVE: CPT

## 2024-12-18 PROCEDURE — 84403 ASSAY OF TOTAL TESTOSTERONE: CPT

## 2024-12-18 PROCEDURE — 82728 ASSAY OF FERRITIN: CPT

## 2024-12-18 PROCEDURE — 83550 IRON BINDING TEST: CPT

## 2024-12-18 PROCEDURE — 83540 ASSAY OF IRON: CPT

## 2024-12-18 PROCEDURE — 82607 VITAMIN B-12: CPT

## 2024-12-18 PROCEDURE — 85025 COMPLETE CBC W/AUTO DIFF WBC: CPT

## 2024-12-18 PROCEDURE — 82306 VITAMIN D 25 HYDROXY: CPT

## 2024-12-18 PROCEDURE — 80053 COMPREHEN METABOLIC PANEL: CPT

## 2024-12-18 PROCEDURE — 36415 COLL VENOUS BLD VENIPUNCTURE: CPT

## 2024-12-18 NOTE — PROGRESS NOTES
Patient is here during wife's appointment looks very pale and lethargic blood sugar was 188 sending him for stat blood work

## 2024-12-31 ENCOUNTER — RESULTS FOLLOW-UP (OUTPATIENT)
Dept: FAMILY MEDICINE CLINIC | Facility: CLINIC | Age: 74
End: 2024-12-31

## 2024-12-31 NOTE — TELEPHONE ENCOUNTER
A. Asked questions to (patient/patient representative as listed on communication consent form) as per provider message. Patient/Patient Representative expressed understanding and had the following response: nfa  Please advise. HAS APPOINTMENT WITH pcp    B. Route to OFFICE CLINICAL POOL for follow-up.

## 2025-01-08 ENCOUNTER — OFFICE VISIT (OUTPATIENT)
Dept: FAMILY MEDICINE CLINIC | Facility: CLINIC | Age: 75
End: 2025-01-08
Payer: MEDICARE

## 2025-01-08 ENCOUNTER — TELEPHONE (OUTPATIENT)
Age: 75
End: 2025-01-08

## 2025-01-08 VITALS
HEART RATE: 76 BPM | DIASTOLIC BLOOD PRESSURE: 50 MMHG | WEIGHT: 286.4 LBS | RESPIRATION RATE: 16 BRPM | OXYGEN SATURATION: 97 % | SYSTOLIC BLOOD PRESSURE: 122 MMHG | HEIGHT: 73 IN | TEMPERATURE: 94.6 F | BODY MASS INDEX: 37.96 KG/M2

## 2025-01-08 DIAGNOSIS — E11.22 TYPE 2 DIABETES MELLITUS WITH STAGE 3A CHRONIC KIDNEY DISEASE, WITH LONG-TERM CURRENT USE OF INSULIN (HCC): Primary | ICD-10-CM

## 2025-01-08 DIAGNOSIS — D69.6 PLATELETS DECREASED (HCC): ICD-10-CM

## 2025-01-08 DIAGNOSIS — E66.01 OBESITY, MORBID (HCC): ICD-10-CM

## 2025-01-08 DIAGNOSIS — G89.29 OTHER CHRONIC PAIN: ICD-10-CM

## 2025-01-08 DIAGNOSIS — R79.89 LOW TESTOSTERONE: ICD-10-CM

## 2025-01-08 DIAGNOSIS — E53.8 B12 DEFICIENCY: ICD-10-CM

## 2025-01-08 DIAGNOSIS — E55.9 VITAMIN D DEFICIENCY: ICD-10-CM

## 2025-01-08 DIAGNOSIS — Z79.4 TYPE 2 DIABETES MELLITUS WITH STAGE 3A CHRONIC KIDNEY DISEASE, WITH LONG-TERM CURRENT USE OF INSULIN (HCC): Primary | ICD-10-CM

## 2025-01-08 DIAGNOSIS — N18.31 TYPE 2 DIABETES MELLITUS WITH STAGE 3A CHRONIC KIDNEY DISEASE, WITH LONG-TERM CURRENT USE OF INSULIN (HCC): Primary | ICD-10-CM

## 2025-01-08 LAB
SL AMB POCT GLUCOSE BLD: 171
SL AMB POCT HEMOGLOBIN AIC: 9.4 (ref ?–6.5)

## 2025-01-08 PROCEDURE — 83036 HEMOGLOBIN GLYCOSYLATED A1C: CPT | Performed by: FAMILY MEDICINE

## 2025-01-08 PROCEDURE — 96372 THER/PROPH/DIAG INJ SC/IM: CPT

## 2025-01-08 PROCEDURE — 82948 REAGENT STRIP/BLOOD GLUCOSE: CPT | Performed by: FAMILY MEDICINE

## 2025-01-08 PROCEDURE — 99214 OFFICE O/P EST MOD 30 MIN: CPT | Performed by: FAMILY MEDICINE

## 2025-01-08 RX ORDER — TRAMADOL HYDROCHLORIDE 50 MG/1
50 TABLET ORAL DAILY PRN
Qty: 30 TABLET | Refills: 0 | Status: SHIPPED | OUTPATIENT
Start: 2025-01-08

## 2025-01-08 RX ORDER — TIRZEPATIDE 2.5 MG/.5ML
2.5 INJECTION, SOLUTION SUBCUTANEOUS WEEKLY
Qty: 2 ML | Refills: 0 | Status: SHIPPED | OUTPATIENT
Start: 2025-01-08 | End: 2025-01-10

## 2025-01-08 RX ORDER — ACYCLOVIR 400 MG/1
1 TABLET ORAL ONCE
Qty: 1 EACH | Refills: 0 | Status: SHIPPED | OUTPATIENT
Start: 2025-01-08 | End: 2025-01-09 | Stop reason: SDUPTHER

## 2025-01-08 RX ORDER — INSULIN LISPRO 100 U/ML
10 INJECTION, SOLUTION SUBCUTANEOUS
Qty: 15 ML | Refills: 0 | Status: SHIPPED | OUTPATIENT
Start: 2025-01-08 | End: 2025-01-10

## 2025-01-08 RX ORDER — ACYCLOVIR 400 MG/1
1 TABLET ORAL
Qty: 9 EACH | Refills: 3 | Status: SHIPPED | OUTPATIENT
Start: 2025-01-08 | End: 2025-01-08 | Stop reason: SDUPTHER

## 2025-01-08 RX ORDER — ACYCLOVIR 400 MG/1
1 TABLET ORAL
Qty: 9 EACH | Refills: 3 | Status: SHIPPED | OUTPATIENT
Start: 2025-01-08 | End: 2025-01-09 | Stop reason: SDUPTHER

## 2025-01-08 RX ORDER — CYANOCOBALAMIN 1000 UG/ML
1000 INJECTION, SOLUTION INTRAMUSCULAR; SUBCUTANEOUS ONCE
Status: COMPLETED | OUTPATIENT
Start: 2025-01-08 | End: 2025-01-08

## 2025-01-08 RX ORDER — ACYCLOVIR 400 MG/1
1 TABLET ORAL ONCE
Qty: 1 EACH | Refills: 0 | Status: SHIPPED | OUTPATIENT
Start: 2025-01-08 | End: 2025-01-08 | Stop reason: SDUPTHER

## 2025-01-08 RX ADMIN — CYANOCOBALAMIN 1000 MCG: 1000 INJECTION, SOLUTION INTRAMUSCULAR; SUBCUTANEOUS at 09:51

## 2025-01-08 NOTE — TELEPHONE ENCOUNTER
Pt's wife called. Pt cannot get in to see the endocrinologist until 5/1. Does the doctor want him to see someone else or wait for that appt. Pt does not want to go to [facility]. Please, advise. Wife said to call her phone back.

## 2025-01-08 NOTE — TELEPHONE ENCOUNTER
Hi, Is there anyway you guys can put this case on the cancellation list. He has been havign his Diabetes taken care of by his cardiologist,and just something seems off about everything and I want him evaluated. Thank you :)

## 2025-01-08 NOTE — PROGRESS NOTES
Name: Donovan Burnette      : 1950      MRN: [id number]  Encounter Provider: Carolynn Foster MD  Encounter Date: 2025   Encounter department: [facility]  :  Assessment & Plan  Type 2 diabetes mellitus with stage 3a chronic kidney disease, with long-term current use of insulin (HCC)  Spent significant time with the family and the patient today.  Patient has been being managed by his cardiologist for his endocrinology.  At this time I would like him to formally be seen by an endocrinologist given that he is on very high doses of Lantus and remains to have elevated A1c's.  Discussed with the family that I would like him to closely monitor his blood sugars and drop off a log in 1 week so that we are able to modify his medications.  I have sent in his CGM which I would like him to closely monitor his blood sugars at least 4-5 times a day in order to avoid hypo and hyperglycemia as  I have sent in Ozempic given that he is on max doses of medications and I believe it would be very beneficial for him overall  Lab Results   Component Value Date    HGBA1C 9.4 (A) 2025     Orders:  •  POCT hemoglobin A1c  •  POCT blood glucose  •  Admelog SoloStar 100 units/mL injection pen; Inject 10 Units under the skin 3 (three) times a day with meals  •  Tirzepatide (Mounjaro) 2.5 MG/0.5ML SOAJ; Inject 2.5 mg under the skin once a week  •  Ambulatory Referral to Endocrinology; Future  •  Continuous Glucose  (Dexcom G7 ) CAROL; Use 1 each once for 1 dose  •  Continuous Glucose Sensor (Dexcom G7 Sensor); Use 1 Device every 10 days    B12 deficiency  Started on B12 injections  Orders:  •  cyanocobalamin injection 1,000 mcg    Vitamin D deficiency  Continue vitamin D supplements       Platelets decreased (HCC)  Currently stable.  Was low when the patient had influenza A and very ill has now normalized       Obesity, morbid (HCC)  Will be started on Ozempic       Low  "testosterone  Referred to endocrinology  Orders:  •  Ambulatory Referral to Endocrinology; Future    Other chronic pain  Started on tramadol to be used as needed  Orders:  •  traMADol (ULTRAM) 50 mg tablet; Take 1 tablet (50 mg total) by mouth daily as needed for moderate pain           History of Present Illness     HPI  74-year-old  male presenting to the office to establish care.  Wife is present in the office today.  Patient has a strong history of diabetes and cardiac disease.  Being managed by his cardiologist for all of his chronic conditions.  Was being seen by primary care for all his acute visits.  Patient states that he has not been feeling very well for some time now feeling very fatigue and wife feels like something just does not seem right.  Patient also has chronic conditions as well as chronic pains.  Patient was never given CGM and is willing to have it to help monitor his sugars.  Is very compliant with all his medications  Is very compliant when seeing his cardiologist.  Does have shortness of breath on exertion      Review of Systems   Constitutional:  Positive for fatigue. Negative for activity change, appetite change, chills and fever.   HENT:  Negative for congestion.    Respiratory:  Positive for shortness of breath. Negative for cough and chest tightness.    Cardiovascular:  Negative for chest pain and leg swelling.   Gastrointestinal:  Negative for abdominal distention, abdominal pain, constipation, diarrhea, nausea and vomiting.   Musculoskeletal:  Positive for arthralgias.   All other systems reviewed and are negative.      Objective   /50 (BP Location: Left arm, Patient Position: Sitting, Cuff Size: Large)   Pulse 76   Temp (!) 94.6 °F (34.8 °C) (Temporal)   Resp 16   Ht 6' 1\" (1.854 m)   Wt 130 kg (286 lb 6.4 oz)   SpO2 97%   BMI 37.79 kg/m²      Physical Exam  Vitals reviewed.   Constitutional:       Appearance: He is well-developed.   HENT:      Head: Normocephalic and " atraumatic.      Right Ear: Tympanic membrane, ear canal and external ear normal.      Left Ear: Tympanic membrane, ear canal and external ear normal.      Nose: Nose normal.      Mouth/Throat:      Mouth: Mucous membranes are moist.   Eyes:      Conjunctiva/sclera: Conjunctivae normal.      Pupils: Pupils are equal, round, and reactive to light.   Cardiovascular:      Rate and Rhythm: Normal rate and regular rhythm.      Heart sounds: Normal heart sounds.   Pulmonary:      Effort: Pulmonary effort is normal.      Breath sounds: Normal breath sounds. No wheezing.   Abdominal:      General: Bowel sounds are normal. There is no distension.      Palpations: Abdomen is soft. There is no mass.      Tenderness: There is no abdominal tenderness.   Musculoskeletal:         General: No tenderness. Normal range of motion.      Cervical back: Normal range of motion and neck supple.   Skin:     General: Skin is warm.      Capillary Refill: Capillary refill takes less than 2 seconds.   Neurological:      Mental Status: He is alert and oriented to person, place, and time.      Cranial Nerves: No cranial nerve deficit.      Sensory: No sensory deficit.      Motor: No abnormal muscle tone.      Coordination: Coordination normal.      Deep Tendon Reflexes: Reflexes normal.   Psychiatric:         Behavior: Behavior normal.         Thought Content: Thought content normal.         Judgment: Judgment normal.

## 2025-01-08 NOTE — ASSESSMENT & PLAN NOTE
Spent significant time with the family and the patient today.  Patient has been being managed by his cardiologist for his endocrinology.  At this time I would like him to formally be seen by an endocrinologist given that he is on very high doses of Lantus and remains to have elevated A1c's.  Discussed with the family that I would like him to closely monitor his blood sugars and drop off a log in 1 week so that we are able to modify his medications.  I have sent in his CGM which I would like him to closely monitor his blood sugars at least 4-5 times a day in order to avoid hypo and hyperglycemia as  I have sent in Ozempic given that he is on max doses of medications and I believe it would be very beneficial for him overall  Lab Results   Component Value Date    HGBA1C 9.4 (A) 01/08/2025     Orders:  •  POCT hemoglobin A1c  •  POCT blood glucose  •  Admelog SoloStar 100 units/mL injection pen; Inject 10 Units under the skin 3 (three) times a day with meals  •  Tirzepatide (Mounjaro) 2.5 MG/0.5ML SOAJ; Inject 2.5 mg under the skin once a week  •  Ambulatory Referral to Endocrinology; Future  •  Continuous Glucose  (Dexcom G7 ) CAROL; Use 1 each once for 1 dose  •  Continuous Glucose Sensor (Dexcom G7 Sensor); Use 1 Device every 10 days

## 2025-01-09 ENCOUNTER — NURSE TRIAGE (OUTPATIENT)
Age: 75
End: 2025-01-09

## 2025-01-09 DIAGNOSIS — R06.02 SOB (SHORTNESS OF BREATH) ON EXERTION: Primary | ICD-10-CM

## 2025-01-09 DIAGNOSIS — E78.5 HYPERLIPIDEMIA, UNSPECIFIED HYPERLIPIDEMIA TYPE: ICD-10-CM

## 2025-01-09 RX ORDER — ACYCLOVIR 400 MG/1
1 TABLET ORAL
Qty: 9 EACH | Refills: 3 | Status: SHIPPED | OUTPATIENT
Start: 2025-01-09

## 2025-01-09 RX ORDER — ACYCLOVIR 400 MG/1
1 TABLET ORAL ONCE
Qty: 1 EACH | Refills: 0 | Status: SHIPPED | OUTPATIENT
Start: 2025-01-09 | End: 2025-01-09

## 2025-01-09 NOTE — TELEPHONE ENCOUNTER
"Patient's spouse called in post OV 1/8 to follow up with provider that patient has extreme fatigue, weakness, lightheaded-dizziness, and change in demeanor (angers easily; quick responses to spouse). Reports FBS=81 this AM (low for patient) with vital signs: GJ=492/69 T=96.5 HR=78 PO2=97%. Giving patient orange juice and will try to get patient to eat breakfast since he does not feel like eating . Will check post prandial  minutes post meal. Has not given any insulin or oral medications as of this phone encounter. Reports patient consumes carbs and sweets hidden around home (pretzels, chocolates, granola bars, cheese crackers). Reports XdH8q=1.4 POCT result 1/8. Trying to get earlier OV with endocrine before May 2025. Patient refuses to go to ER per spouse recommendation. Please follow with Ashley for provider response for patient.     Reason for Disposition   Caller has NON-URGENT medication or insulin device (e.g., pump, continuous monitoring)  question and triager unable to answer question    Answer Assessment - Initial Assessment Questions  1. SYMPTOMS: \"What symptoms are you concerned about?\"      Extreme Fatigue; weakness, angers easily  2. ONSET:  \"When did the symptoms start?\"      More the 1 month  3. BLOOD GLUCOSE: \"What is your blood glucose level?\"       81 this AM; 198 port evening meal 1/8  4. USUAL RANGE: \"What is your blood glucose level usually?\" (e.g., usual fasting morning value, usual evening value)      Usually high; 171 1/8  5. TYPE 1 or 2:  \"Do you know what type of diabetes you have?\"  (e.g., Type 1, Type 2, Gestational; doesn't know)       Type 2  6. INSULIN: \"Do you take insulin?\" \"What type of insulin(s) do you use? What is the mode of delivery? (syringe, pen; injection or pump) \"When did you last give yourself an insulin dose?\" (i.e., time or hours/minutes ago) \"How much did you give?\" (i.e., how many units)      Admelog SoloStar 100 units/mL injection pen 10 units 3 times per day " "with meals since 9/11  Lantus SoloStar 100 units/mL SOPN 80 units 2 times per day  Tirzepatide (Mounjaro) 2.5 MG/0.5ML SOAJ 2.5 mg weekly  7. DIABETES PILLS: \"Do you take any pills for your diabetes?\" If Yes, ask: \"What is the name of the medicine(s) that you take for high blood sugar?\"      metFORMIN (GLUCOPHAGE) 1000 MG tablet 2 x time day with meals  8. OTHER SYMPTOMS: \"Do you have any symptoms?\" (e.g., fever, frequent urination, difficulty breathing, vomiting)      Frequent urination (3 times overnight)  9. LOW BLOOD GLUCOSE TREATMENT: \"What have you done so far to treat the low blood glucose level?\"      Giving orange juice; will try to get patient to eat something  10. FOOD: \"When did you last eat or drink?\"        Evening meal 1/8  11. ALONE: \"Are you alone right now or is someone with you?\"         Spouse is with patient   12. PREGNANCY: \"Is there any chance you are pregnant?\" \"When was your last menstrual period?\"        N/a    Protocols used: Diabetes - Low Blood Sugar-Adult-[place]    "

## 2025-01-09 NOTE — TELEPHONE ENCOUNTER
Called patient offered [place] location due to consult availability with Dr. Keen's (Oversite at this location on Thursday's) . Patient was aware that he may follow up with Dr. Keen. He requested to call back if interested. I also offered Dr. Headley in [place]  with a sooner availability but explained his care would continue at that location. He was grateful and will call in reference to the 1- [place] visit @ 8:00 or 10:00 if still available at call back with  Dr. Enrique Payne. I will continue to monitor for a sooner appointment with Dr. Keen in the [place] location.

## 2025-01-09 NOTE — TELEPHONE ENCOUNTER
Patient's wife called back with patient on the line. Patient  was feeling weak, lightheaded and dizzy when she called at 10:19  today.  Patient had 2 boiled eggs, toast and orange juice around 11:15 and took medication about 20 minutes after that. His sugar at around 1:40 was 214. He now feels about 75% better than he did this morning but is still fatigued and a little lightheaded. Patient was offered appointment with Dr Sam ELIZALDE Endo [place] on 2/21. He did not accept as yet as he is concerned about driving that far, but understands that Dr Keen does not have anything sooner in [place] before 5/1. He is on cancellation list for [place] and will decide if he will go with sooner appointment in [place].

## 2025-01-09 NOTE — TELEPHONE ENCOUNTER
81; did the patient feel weak? Dizzy? Tired at the time of low? I don't think this should be a reason to go to the ED. Can we call Sandie again to get him in sooner.

## 2025-01-10 ENCOUNTER — CONSULT (OUTPATIENT)
Dept: ENDOCRINOLOGY | Facility: CLINIC | Age: 75
End: 2025-01-10
Payer: MEDICARE

## 2025-01-10 ENCOUNTER — TELEPHONE (OUTPATIENT)
Age: 75
End: 2025-01-10

## 2025-01-10 ENCOUNTER — TELEPHONE (OUTPATIENT)
Dept: ENDOCRINOLOGY | Facility: CLINIC | Age: 75
End: 2025-01-10

## 2025-01-10 VITALS
DIASTOLIC BLOOD PRESSURE: 70 MMHG | WEIGHT: 286 LBS | SYSTOLIC BLOOD PRESSURE: 108 MMHG | HEIGHT: 73 IN | BODY MASS INDEX: 37.91 KG/M2 | OXYGEN SATURATION: 98 % | HEART RATE: 73 BPM

## 2025-01-10 DIAGNOSIS — E66.812 CLASS 2 SEVERE OBESITY WITH SERIOUS COMORBIDITY AND BODY MASS INDEX (BMI) OF 37.0 TO 37.9 IN ADULT, UNSPECIFIED OBESITY TYPE (HCC): ICD-10-CM

## 2025-01-10 DIAGNOSIS — I10 PRIMARY HYPERTENSION: ICD-10-CM

## 2025-01-10 DIAGNOSIS — N18.31 TYPE 2 DIABETES MELLITUS WITH STAGE 3A CHRONIC KIDNEY DISEASE, WITH LONG-TERM CURRENT USE OF INSULIN (HCC): Primary | ICD-10-CM

## 2025-01-10 DIAGNOSIS — E11.22 TYPE 2 DIABETES MELLITUS WITH STAGE 3A CHRONIC KIDNEY DISEASE, WITH LONG-TERM CURRENT USE OF INSULIN (HCC): Primary | ICD-10-CM

## 2025-01-10 DIAGNOSIS — Z79.4 TYPE 2 DIABETES MELLITUS WITH STAGE 3A CHRONIC KIDNEY DISEASE, WITH LONG-TERM CURRENT USE OF INSULIN (HCC): Primary | ICD-10-CM

## 2025-01-10 DIAGNOSIS — Z79.4 TYPE 2 DIABETES MELLITUS WITH HYPERGLYCEMIA, WITH LONG-TERM CURRENT USE OF INSULIN (HCC): ICD-10-CM

## 2025-01-10 DIAGNOSIS — R79.89 LOW TESTOSTERONE: ICD-10-CM

## 2025-01-10 DIAGNOSIS — E11.65 TYPE 2 DIABETES MELLITUS WITH HYPERGLYCEMIA, WITH LONG-TERM CURRENT USE OF INSULIN (HCC): ICD-10-CM

## 2025-01-10 DIAGNOSIS — E66.01 CLASS 2 SEVERE OBESITY WITH SERIOUS COMORBIDITY AND BODY MASS INDEX (BMI) OF 37.0 TO 37.9 IN ADULT, UNSPECIFIED OBESITY TYPE (HCC): ICD-10-CM

## 2025-01-10 PROCEDURE — 99204 OFFICE O/P NEW MOD 45 MIN: CPT | Performed by: INTERNAL MEDICINE

## 2025-01-10 RX ORDER — INSULIN LISPRO 100 U/ML
10 INJECTION, SOLUTION SUBCUTANEOUS
Qty: 30 ML | Refills: 1 | Status: SHIPPED | OUTPATIENT
Start: 2025-01-10

## 2025-01-10 RX ORDER — INSULIN GLARGINE 100 [IU]/ML
INJECTION, SOLUTION SUBCUTANEOUS
Qty: 45 ML | Refills: 1 | Status: SHIPPED | OUTPATIENT
Start: 2025-01-10

## 2025-01-10 NOTE — PATIENT INSTRUCTIONS
Decrease lantus to 60 units twice a day and  Change Admelog to 10 units with meals 3 times a day as long as you are eating a carbohydrate containing meal if you are skipping a meal or not eating any carbohydrates, please do not take the mealtime insulin  Continue rest of the medications at current doses     Plan for review of blood sugars in 2-3 weeks  Referred to diabetes education, podiatry and ophthalmology   Labs to be done as ordered before 10-11 am in the morning

## 2025-01-10 NOTE — PROGRESS NOTES
Name: Donovan Burnette      : 1950      MRN: [id number]  Encounter Provider: Meagan Keen MD  Encounter Date: 1/10/2025   Encounter department: [facility]  :  Assessment & Plan  Type 2 diabetes mellitus with stage 3a chronic kidney disease, with long-term current use of insulin (HCC)    Lab Results   Component Value Date    HGBA1C 9.4 (A) 2025       Orders:    Ambulatory Referral to Endocrinology  Pt reports that he is feeling thirsty all the time and drinking a lot of water, reports peeing more than usual  Pt is not following low carb/low calories diet  Pt is not exercising  Diagnosed with T2 DM about 35 years ago, reports gradual weight gain  Family h/o: brother T2DM with AKA, BKA  Pt reports that he was initially started on Janumet, metformin, and for the last several years is on insulin   Pt states that his diabetes is being managed by cardiovascular specialist (unclear why)  Pt was on Mounjaro for short period of time about a year ago however its a cost prohibited, as well as Ozempic for a month a month ago however he can't afford  Current regimen:  Lantus 80 U BID   pt is not checkig bs regularly as it is very painful and he is scared of  needle  Metformin 500 mg daily  Glipizide 10 mg BID  Admelog 10 U before bfst, before dinner  PCP ordered Dexcom7 yesterday on 25  Pt reports seeing ophthalmology annually. Currently is due for annual eval  Never seen by podiatry, no ulcers, no foot exams in the chart  Admits Neuropathy b/l fingers, and feet  Wife brought glucose log for the last 2 days:  Yesterday am fasting sugars - 81,   2 h after bfst - 214  Last night after dinner - 249  This am fasting glucose - 139  Pt reports that he has episodes of tiredness that he need to sit down however he never checked his glucose during this episodes  Plan:  Check your blood sugar regularly (Dexcom or manually with fingerstick)  Diabetic education for diabetic diet  "and sensor use education  Decrease Lantus dose to 60 U BID  Increase Admelog Frequency to 10 U TID with meals  Repeat Blood work in 3 months: A1C, BMP  Schedule appt with ophthalmology  Schedule appt with podiatry  Return to the office in 3 months  Low testosterone  Erectile dysfunction starting late 40 s  Reports that he was told that he has low testosterone  Libido low since his 40s  Got testosterone gel, Sildenaphil years ago by GP which provided no help  Plan:  Obtain blood work:   Free and total testosterone before 11 am  TSH  Prolactin  Lipid panel  Testosterone binding globulin    Orders:    Ambulatory Referral to Endocrinology        History of Present Illness   Donovan Burnette is a 74 y.o. male with PMH of HTN, obesity, low plt, CKD who presents to discuss his T2DM and low testosterone.    Pt presents for consult with his wife and she is contributing to the history. Pt states that he is feeling thirsty all the time and has dry mouth. Also, he mentioned that he is peeing a lot, getting 3-4 times a night worsening during the last 1.5 years. He denies problem with urine stream, straining or dripping. Pt states that he is scared of needles and is not checking his blood glucose regularly. He admits that he is not following diabetic diet and is not exercising regularly. Pt informs that his diabetes is being managed by his cardiovascular specialist. (Unclear why). Pt admits that he is feeling more fatigue during the last year. When pt is asked what is his goal with diabetes he states :\"less needles\".  Pt was encouraged to improve his lifestyle including diet and exercise. Pt was encouraged to check his blood glucose regularly. Additionally, pt states that he endorses low libido and erectile dysfunction since his 40s. He was treated with Cialis and androgen gel with no improvement several years ago by GP.                     Review of Systems   Constitutional:  Negative for chills and fever.   HENT:  Negative for ear " "pain and sore throat.    Eyes:  Negative for pain and visual disturbance.   Respiratory:  Negative for cough and shortness of breath.    Cardiovascular:  Negative for chest pain and palpitations.   Gastrointestinal:  Negative for abdominal pain and vomiting.   Endocrine: Positive for polydipsia and polyuria.   Genitourinary:  Positive for frequency. Negative for dysuria and hematuria.        Erectile dysfunction    Musculoskeletal:  Positive for arthralgias. Negative for back pain.   Skin:  Negative for color change and rash.   Neurological:  Negative for seizures and syncope.   All other systems reviewed and are negative.         Objective   /70 (BP Location: Left arm, Patient Position: Sitting, Cuff Size: Large)   Pulse 73   Ht 6' 1\" (1.854 m)   Wt 130 kg (286 lb)   SpO2 98%   BMI 37.73 kg/m²      Physical Exam  Vitals and nursing note reviewed.   Constitutional:       General: He is not in acute distress.     Appearance: He is well-developed.   HENT:      Head: Normocephalic and atraumatic.   Eyes:      Conjunctiva/sclera: Conjunctivae normal.   Cardiovascular:      Rate and Rhythm: Normal rate and regular rhythm.      Heart sounds: No murmur heard.  Pulmonary:      Effort: Pulmonary effort is normal. No respiratory distress.      Breath sounds: Normal breath sounds.   Abdominal:      Palpations: Abdomen is soft.      Tenderness: There is no abdominal tenderness.   Musculoskeletal:         General: No swelling.      Cervical back: Neck supple.      Right lower leg: Edema present.      Left lower leg: Edema present.      Comments: B/l trace edema   Skin:     General: Skin is warm and dry.      Capillary Refill: Capillary refill takes less than 2 seconds.   Neurological:      Mental Status: He is alert.   Psychiatric:         Mood and Affect: Mood normal.           "

## 2025-01-10 NOTE — TELEPHONE ENCOUNTER
Please contact the patient around 8- 9 AM    Spoke to the patient tonight in great detail we spent about 30 minutes reviewing all his blood sugars.  I have made the changes below  Given shortness of breath on exertion I am ordering an echocardiogram/a calcium score test.  Patient states that he has a history of possible calcification on his aorta and valves arteries per wife.  But has not had a stress test or echocardiogram in several years.  Is very compliant with his cardiologist.  Advised that I will order the test and will have him follow-up with his cardiologist once completed    Will have the patient start taking the following for insulin   IF in the AM BS is < 90 will reduce Fast acting to 5 units rather then 10 units      Tomorrow in the AM start  40 units on both sides of the abd= 80 units for better absorption rather then 80 in total.

## 2025-01-10 NOTE — ASSESSMENT & PLAN NOTE
Lab Results   Component Value Date    HGBA1C 9.4 (A) 01/08/2025       Orders:    Ambulatory Referral to Endocrinology  Pt reports that he is feeling thirsty all the time and drinking a lot of water, reports peeing more than usual  Pt is not following low carb/low calories diet  Pt is not exercising  Diagnosed with T2 DM about 35 years ago, reports gradual weight gain  Family h/o: brother T2DM with AKA, BKA  Pt reports that he was initially started on Janumet, metformin, and for the last several years is on insulin   Pt states that his diabetes is being managed by cardiovascular specialist (unclear why)  Pt was on Mounjaro for short period of time about a year ago however its a cost prohibited, as well as Ozempic for a month a month ago however he can't afford  Current regimen:  Lantus 80 U BID   pt is not checkig bs regularly as it is very painful and he is scared of  needle  Metformin 500 mg daily  Glipizide 10 mg BID  Admelog 10 U before bfst, before dinner  PCP ordered Dexcom7 yesterday on 1/9/25  Pt reports seeing ophthalmology annually. Currently is due for annual eval  Never seen by podiatry, no ulcers, no foot exams in the chart  Admits Neuropathy b/l fingers, and feet  Wife brought glucose log for the last 2 days:  Yesterday am fasting sugars - 81,   2 h after bfst - 214  Last night after dinner - 249  This am fasting glucose - 139  Pt reports that he has episodes of tiredness that he need to sit down however he never checked his glucose during this episodes  Plan:  Check your blood sugar regularly (Dexcom or manually with fingerstick)  Diabetic education for diabetic diet and sensor use education  Decrease Lantus dose to 60 U BID  Increase Admelog Frequency to 10 U TID with meals  Repeat Blood work in 3 months: A1C, BMP  Schedule appt with ophthalmology  Schedule appt with podiatry  Return to the office in 3 months

## 2025-01-13 ENCOUNTER — NURSE TRIAGE (OUTPATIENT)
Age: 75
End: 2025-01-13

## 2025-01-13 NOTE — TELEPHONE ENCOUNTER
PA for Alexey Bailontar 100 U SUBMITTED to [employer]    via    [x]CMM-KEY: [id number]  []Surescripts-Case ID #   []Availity-Auth ID # NDC #   []Faxed to plan   []Other website   []Phone call Case ID #     [x]PA sent as URGENT    All office notes, labs and other pertaining documents and studies sent. Clinical questions answered. Awaiting determination from insurance company.     Turnaround time for your insurance to make a decision on your Prior Authorization can take 7-21 business days.

## 2025-01-13 NOTE — TELEPHONE ENCOUNTER
Received message regarding conversation between POD and patient and his wife. Spoke with Aamir and his wife Ashley who note that this morning patient was noted to have BG of 42 and was lethargic and weak. Symptoms improved mildly after patient had breakfast which consisted of orange juice and an omelet with peppers and BG went up to about 100mg/dL. Ashley notes patient spent some of the day napping. At approximately 3pm they noted that Aamir's BG decrased to 56mg/dL and after a few mintues - to 46mg/dL. Unclear if patient symptomatic at the time. After eating a sandwich sugars improved and at the time of my call were 204mg/dL.   He was seen in consultation on 1/10/25 and at the times Lantus was decreased from 80 to 60 units BID. He is also on glipizide 10mg BID and Admelog 10 units three times daily before meals, Metformin 1g BID.   They note that fasting sugars were noted to be below 80 over the weekend.     Advised decreasing Lantus to 40 units BID.   Continue mealtime Admelog, glipizide and Metformin at current dose.   Since he took his full dose of Lantus this morning, advised patient to hold his evening glipizide dose.   Advised patient in wife reach out to office tomorrow morning for possible Dexcom G6 download vs let them know what BG has been in case additional adjustments need to be made to regimen.

## 2025-01-13 NOTE — TELEPHONE ENCOUNTER
"Reason for Disposition  • [1] Blood glucose 70  mg/dL (3.9 mmol/L) or below OR symptomatic, now improved with Care Advice AND [2] cause unknown    Answer Assessment - Initial Assessment Questions  1. SYMPTOMS: \"What symptoms are you concerned about?\"      Tiredness, lethargy, weakness   2. ONSET:  \"When did the symptoms start?\"      3 months   3. BLOOD GLUCOSE: \"What is your blood glucose level?\"       430p-181    wife reports @ 0600-42  4. USUAL RANGE: \"What is your blood glucose level usually?\" (e.g., usual fasting morning value, usual evening value)      Wife is unsure- states its all over the place   5. TYPE 1 or 2:  \"Do you know what type of diabetes you have?\"  (e.g., Type 1, Type 2, Gestational; doesn't know)       Type 2  6. INSULIN: \"Do you take insulin?\" \"What type of insulin(s) do you use? What is the mode of delivery? (syringe, pen; injection or pump) \"When did you last give yourself an insulin dose?\" (i.e., time or hours/minutes ago) \"How much did you give?\" (i.e., how many units)      Yes- Admelog 100u- 10u TID w/meals (Patient reports only taking when high carb meal), Lantus 100u- 60u BID  7. DIABETES PILLS: \"Do you take any pills for your diabetes?\" If Yes, ask: \"What is the name of the medicine(s) that you take for high blood sugar?\"      Metformin 1000mg- 1 tab BID w/meals, Glipizide 10mg BID  8. OTHER SYMPTOMS: \"Do you have any symptoms?\" (e.g., fever, frequent urination, difficulty breathing, vomiting)      Denies   9. LOW BLOOD GLUCOSE TREATMENT: \"What have you done so far to treat the low blood glucose level?\"      Consumes food/ orange juice   10. FOOD: \"When did you last eat or drink?\"        Reports 3:00pm   11. ALONE: \"Are you alone right now or is someone with you?\"         With wife    Protocols used: Diabetes - Low Blood Sugar-Adult-AH    "

## 2025-01-13 NOTE — TELEPHONE ENCOUNTER
"Patient wife calling in for him. She states Patient has been having \"really low\" readings via his Dexcom 7 readings in the early mornings. States this morning at 0600 bg was a 42- wife reports Patient was tired, weak and lethargic. States she gave him a meal, pushed orange juice and it climbed back up to the 100's then dropped back down shortly. Wife states they are not connected to the office- Dexcom in new to Patient so he has a f/u on 1/20. She was unable to give me a list of sugars from today. She was able to provide 0600@ 42 and 430p @ 181. States he is currently asymptomatic, but reports it will drop back down randomly, especially later tonight/early AM and is very concerned. Reports Patient taking Admelog 100u- 10u TID w/meals (only administers if eating high carb meal), Lantus 100u/ml- 60u BID, metformin 1000mg BID (not taking with meals, and Glipizide 10mg BID. Care advice reviewed- recommended if Patient bg cont to be under 70 and symptomatic to seek emergency care, verbalized understanding. Message sent to on call to please advise  "

## 2025-01-14 ENCOUNTER — TELEPHONE (OUTPATIENT)
Age: 75
End: 2025-01-14

## 2025-01-14 PROBLEM — R79.89 LOW TESTOSTERONE: Status: ACTIVE | Noted: 2025-01-14

## 2025-01-14 PROBLEM — E66.812 CLASS 2 SEVERE OBESITY WITH SERIOUS COMORBIDITY AND BODY MASS INDEX (BMI) OF 37.0 TO 37.9 IN ADULT (HCC): Status: ACTIVE | Noted: 2025-01-14

## 2025-01-14 PROBLEM — Z79.4 TYPE 2 DIABETES MELLITUS WITH HYPERGLYCEMIA, WITH LONG-TERM CURRENT USE OF INSULIN (HCC): Status: ACTIVE | Noted: 2025-01-14

## 2025-01-14 PROBLEM — E66.01 CLASS 2 SEVERE OBESITY WITH SERIOUS COMORBIDITY AND BODY MASS INDEX (BMI) OF 37.0 TO 37.9 IN ADULT (HCC): Status: ACTIVE | Noted: 2025-01-14

## 2025-01-14 PROBLEM — E11.65 TYPE 2 DIABETES MELLITUS WITH HYPERGLYCEMIA, WITH LONG-TERM CURRENT USE OF INSULIN (HCC): Status: ACTIVE | Noted: 2025-01-14

## 2025-01-14 NOTE — ASSESSMENT & PLAN NOTE
Erectile dysfunction starting late 40 s  Reports that he was told that he has low testosterone  Libido low since his 40s  Got testosterone gel, Sildenaphil years ago by GP which provided no help  Plan:  Obtain blood work:   Free and total testosterone before 11 am  TSH  Prolactin  Lipid panel  Testosterone binding globulin    Orders:    Ambulatory Referral to Endocrinology

## 2025-01-14 NOTE — TELEPHONE ENCOUNTER
Reviewed notes.  Recommend continuing with Lantus 40 units subcutaneously twice a day as recommended yesterday, Humalog 10 units with meals  If the patient has not taken any glipizide today, recommend holding glipizide for now.  If patient does not not have any further hypoglycemia, will review CGM download once it has been done on 1/20

## 2025-01-14 NOTE — TELEPHONE ENCOUNTER
[facility] patient order for Dexcom G7 ordered by PCP on 1/9/25. Rosana reports information required for Medicare to support order are:  Current patient of provider; last OV 1/8/25  Is patient on insulin, per Medicare.  She is faxing request for clinical notes form recent OV 1/8 and detailed written order to be completed for Medicare billing to [employer] [facility] most recent OV. Please keep eye out for fax.

## 2025-01-14 NOTE — TELEPHONE ENCOUNTER
Patient's wife states patient is on trial dexcom and not connected to office. [facility] patient has an appt on 1-20 for sensior download. Fasting BS this morning was 151. Current . [facility] BS did not drop at all overnight. Please advise, thank you.

## 2025-01-16 ENCOUNTER — OFFICE VISIT (OUTPATIENT)
Dept: DIABETES SERVICES | Facility: CLINIC | Age: 75
End: 2025-01-16
Payer: MEDICARE

## 2025-01-16 VITALS — WEIGHT: 286 LBS | BODY MASS INDEX: 37.73 KG/M2

## 2025-01-16 DIAGNOSIS — N18.31 TYPE 2 DIABETES MELLITUS WITH STAGE 3A CHRONIC KIDNEY DISEASE, WITH LONG-TERM CURRENT USE OF INSULIN (HCC): Primary | ICD-10-CM

## 2025-01-16 DIAGNOSIS — Z79.4 TYPE 2 DIABETES MELLITUS WITH HYPERGLYCEMIA, WITH LONG-TERM CURRENT USE OF INSULIN (HCC): ICD-10-CM

## 2025-01-16 DIAGNOSIS — E11.22 TYPE 2 DIABETES MELLITUS WITH STAGE 3A CHRONIC KIDNEY DISEASE, WITH LONG-TERM CURRENT USE OF INSULIN (HCC): Primary | ICD-10-CM

## 2025-01-16 DIAGNOSIS — Z79.4 TYPE 2 DIABETES MELLITUS WITH STAGE 3A CHRONIC KIDNEY DISEASE, WITH LONG-TERM CURRENT USE OF INSULIN (HCC): Primary | ICD-10-CM

## 2025-01-16 DIAGNOSIS — E11.65 TYPE 2 DIABETES MELLITUS WITH HYPERGLYCEMIA, WITH LONG-TERM CURRENT USE OF INSULIN (HCC): ICD-10-CM

## 2025-01-16 DIAGNOSIS — R79.89 LOW TESTOSTERONE: ICD-10-CM

## 2025-01-16 PROCEDURE — 97802 MEDICAL NUTRITION INDIV IN: CPT

## 2025-01-17 ENCOUNTER — TELEPHONE (OUTPATIENT)
Age: 75
End: 2025-01-17

## 2025-01-17 NOTE — TELEPHONE ENCOUNTER
Patient said he would rather take a stress test as he's not comfortable doing the coronary calcium test.

## 2025-01-17 NOTE — TELEPHONE ENCOUNTER
"Pt called to request to speak with Dr Chavarria about an upcoming test.  He has a CT Coronary Calcium Score scheduled for Wed 1/22/25.  Pt said he has a very small passageway for air and has trouble breathing when laying flat, and he \"does not do well with CT or MRI\" tests.  He said he gets anxious just thinking about the test.  He would like to talk to Dr Chavarria about possible alternatives to get the information she is looking for.  Please call pt.  Thank you!  "

## 2025-01-17 NOTE — TELEPHONE ENCOUNTER
Would you like me to call him in something for anxiety such as Xanax.  The only alternatives is a stress test which 1 exactly tell us how much plaque there is in the arteries if there is

## 2025-01-17 NOTE — PROGRESS NOTES
"Medical Nutrition Therapy        Assessment    Visit Type: Initial visit  Chief complaint/Medical Diagnosis/reason for visit Type 2 Diabetes Mellitus with stage 3a chronic kidney disease with long term current use of insulin    HPI Aamir came in for his initial Medical Nutrition Therapy appointment with his wife Ashley.  Aamir stated his goal is to lower his A1C and blood sugars.  Aamir's most current A1C = 9.4 on 1/8/25.  Aamir has had diabetes over 30 years.  Obtained a 24 hour food recall. Problems identified in food recall include inconsistent carbohydrate intake, sometimes only meat and veggie at dinner, unbalanced meals, sometimes snacks on mini pretzels at various times during the day.  Provided patient with a 2200 calorie balanced individualized meal plan to assist with consistency, balance and portion control.  Encouraged the consumption of balanced meals and snacks at appropriate times.  Advised patient to keep carbohydrate intake to 45 grams per meal and 30 grams per snack to assist with glycemic control.  Suggested keeping protein intake balanced with carbohydrate intake and having heart healthy fat to assist with lipid management and calorie control. My Plate, food models, portion booklet and food labels were used to teach basic carbohydrate counting. Patient stated he will call at a later date if he desires additional Medical Nutrition education. RD will remain available for further dietary questions/concerns.     Ht Readings from Last 1 Encounters:   01/10/25 6' 1\" (1.854 m)     Wt Readings from Last 3 Encounters:   01/16/25 130 kg (286 lb)   01/10/25 130 kg (286 lb)   01/08/25 130 kg (286 lb 6.4 oz)     Weight Change: No    Barriers to Learning: no barriers    Do you follow any special diet presently?: Yes - low carbohydrate  Who shops: patient and spouse  Who cooks: spouse    Food Log: Completed via the method of food recall    Breakfast:9:30AM - 2 eggs, 1 sl toast, coffee with Splenda; or Cherrios with " milk, coffee; 2 eggs, nguyen or sausage with hash brown darryn, coffee  Morning Snack:None  Lunch:Beatty with 3 sl ham 1 sl cheese, chips, Coke Zero  Afternoon Snack: 2PM - pretzel sticks or small pretzels with water  Dinner:6PM - pork loin, roasted red beets ; chicken,stuffing, veggie, water  Evening Snack:pretzels, water  Beverages: water, coffee  Eating out/Take out:2x/month  Exercise walking their dog 15-30 minutes/day    Calorie needs 2200kcals/day Carbs: 45g/meal, 30g/snack     Fat: heart healthy    Protein:balance with carbohydrate    Nutrition Diagnosis:  Food and nutrition related knowledge deficit  related to Lack of prior exposure to accurate nutrition related information as evidenced by Verbalizes inaccurate or incomplete information    Intervention: plate method, label reading, carbohydrate counting, increased protein intake, meal timing, weight/ BMI goals, meal planning, individualized meal plan, monitoring portion control, and exercise guidelines     Treatment Goals: Patient will consume 3 meals a day, Patient will consume snacks, and Patient will count carbohydrates    Monitoring and evaluation:    Term code indicator  FH 1.3.2 Food Intake Criteria: Consume 3 balanced meals/day at appropriate times.  Term code indicator  FH 1.6.3 Carbohydrate Intake Criteria: 45 grams carbohydrate/meal and 30 grams carbohydrate/snack.  Term code indicator  FH 1.3.2 Food Intake Criteria: Consume 2 balanced snacks/day at appropriate times.    Materials Provided: CHO counting, individualized meal plan, portion booklet    Patient’s Response to Instruction:  Comprehensiongood  Motivationgood  Expected Compliancegood    Begin Time: 10:45AM  End Time: 12:15PM  Referring Provider: Meagan Keen MD    Thank you for coming to the [facility] for education today.  Please feel free to call with any questions or concerns.    Jasmin Aguilar, RD  [address]  [id number]  [phone]

## 2025-01-17 NOTE — PATIENT INSTRUCTIONS
Consume 3 balanced meals/day at appropriate times.  45 grams carbohydrate/meal and 30 grams carbohydrate/snack.  Consume 2 balanced snacks/day at appropriate times.

## 2025-01-20 ENCOUNTER — CLINICAL SUPPORT (OUTPATIENT)
Dept: ENDOCRINOLOGY | Facility: CLINIC | Age: 75
End: 2025-01-20

## 2025-01-20 DIAGNOSIS — Z79.4 TYPE 2 DIABETES MELLITUS WITH STAGE 3A CHRONIC KIDNEY DISEASE, WITH LONG-TERM CURRENT USE OF INSULIN (HCC): Primary | ICD-10-CM

## 2025-01-20 DIAGNOSIS — E11.22 TYPE 2 DIABETES MELLITUS WITH STAGE 3A CHRONIC KIDNEY DISEASE, WITH LONG-TERM CURRENT USE OF INSULIN (HCC): Primary | ICD-10-CM

## 2025-01-20 DIAGNOSIS — N18.31 TYPE 2 DIABETES MELLITUS WITH STAGE 3A CHRONIC KIDNEY DISEASE, WITH LONG-TERM CURRENT USE OF INSULIN (HCC): Primary | ICD-10-CM

## 2025-01-20 PROCEDURE — PBNCHG PB NO CHARGE PLACEHOLDER

## 2025-01-20 NOTE — PROGRESS NOTES
Pt. Came to down load his G7 also his ins. Paid for monitor/sensors . Pt. Request to teach his wife how to place the sensor. Thanks

## 2025-01-20 NOTE — TELEPHONE ENCOUNTER
Recommend seeing Cardiology for stress test!  Advised the patient to contact his cardiologist and discuss this.  They have an incoming appointment in  Feb.

## 2025-01-21 ENCOUNTER — TREATMENT (OUTPATIENT)
Dept: ENDOCRINOLOGY | Facility: CLINIC | Age: 75
End: 2025-01-21
Payer: MEDICARE

## 2025-01-21 DIAGNOSIS — E11.22 TYPE 2 DIABETES MELLITUS WITH STAGE 3A CHRONIC KIDNEY DISEASE, WITH LONG-TERM CURRENT USE OF INSULIN (HCC): Primary | ICD-10-CM

## 2025-01-21 DIAGNOSIS — Z79.4 TYPE 2 DIABETES MELLITUS WITH STAGE 3A CHRONIC KIDNEY DISEASE, WITH LONG-TERM CURRENT USE OF INSULIN (HCC): Primary | ICD-10-CM

## 2025-01-21 DIAGNOSIS — N18.31 TYPE 2 DIABETES MELLITUS WITH STAGE 3A CHRONIC KIDNEY DISEASE, WITH LONG-TERM CURRENT USE OF INSULIN (HCC): Primary | ICD-10-CM

## 2025-01-21 PROCEDURE — 95251 CONT GLUC MNTR ANALYSIS I&R: CPT | Performed by: INTERNAL MEDICINE

## 2025-01-21 NOTE — PROGRESS NOTES
Dexcom download reviewed      Recommend the ifololowing  -Continue Lantus 40 units subcutaneous in the morning, decrease to 30 unit subcutaneously at bedtime  - Increase Humalog to 12 units with meals 3 times a day  - discontinue glipizide   - continue metformin

## 2025-01-23 ENCOUNTER — HOSPITAL ENCOUNTER (OUTPATIENT)
Dept: NON INVASIVE DIAGNOSTICS | Facility: HOSPITAL | Age: 75
Discharge: HOME | End: 2025-01-23
Attending: FAMILY MEDICINE
Payer: MEDICARE

## 2025-01-23 VITALS
WEIGHT: 286 LBS | BODY MASS INDEX: 37.91 KG/M2 | HEIGHT: 73 IN | HEART RATE: 73 BPM | SYSTOLIC BLOOD PRESSURE: 108 MMHG | DIASTOLIC BLOOD PRESSURE: 70 MMHG

## 2025-01-23 DIAGNOSIS — R06.02 SOB (SHORTNESS OF BREATH) ON EXERTION: ICD-10-CM

## 2025-01-23 PROCEDURE — 93306 TTE W/DOPPLER COMPLETE: CPT | Performed by: INTERNAL MEDICINE

## 2025-01-23 PROCEDURE — 93306 TTE W/DOPPLER COMPLETE: CPT

## 2025-01-24 LAB
AORTIC ROOT: 3.2 CM
AV LVOT PEAK GRADIENT: 3 MMHG
AV PEAK GRADIENT: 5 MMHG
BSA FOR ECHO PROCEDURE: 2.51 M2
DOP CALC LVOT AREA: 3.46 CM2
DOP CALC LVOT DIAMETER: 2.1 CM
E WAVE DECELERATION TIME: 269 MS
E/A RATIO: 0.71
FRACTIONAL SHORTENING: 36 (ref 28–44)
INTERVENTRICULAR SEPTUM IN DIASTOLE (PARASTERNAL SHORT AXIS VIEW): 0.7 CM
INTERVENTRICULAR SEPTUM: 0.7 CM (ref 0.6–1.1)
LAAS-AP2: 27.7 CM2
LAAS-AP4: 18.7 CM2
LEFT ATRIUM SIZE: 4.3 CM
LEFT ATRIUM VOLUME (MOD BIPLANE): 74 ML
LEFT ATRIUM VOLUME INDEX (MOD BIPLANE): 29.6 ML/M2
LEFT INTERNAL DIMENSION IN SYSTOLE: 3.6 CM (ref 2.1–4)
LEFT VENTRICULAR INTERNAL DIMENSION IN DIASTOLE: 5.6 CM (ref 3.5–6)
LEFT VENTRICULAR POSTERIOR WALL IN END DIASTOLE: 0.9 CM
LEFT VENTRICULAR STROKE VOLUME: 95 ML
LV EF US.2D.A4C+ESTIMATED: 57 %
LVSV (TEICH): 95 ML
MV E'TISSUE VEL-SEP: 6 CM/S
MV PEAK A VEL: 0.78 M/S
MV PEAK E VEL: 55 CM/S
MV STENOSIS PRESSURE HALF TIME: 78 MS
MV VALVE AREA P 1/2 METHOD: 2.82
RIGHT ATRIUM AREA SYSTOLE A4C: 24.8 CM2
RIGHT VENTRICLE ID DIMENSION: 4.3 CM
SL CV LEFT ATRIUM LENGTH A2C: 6.2 CM
SL CV LV EF: 57
SL CV PED ECHO LEFT VENTRICLE DIASTOLIC VOLUME (MOD BIPLANE) 2D: 151 ML
SL CV PED ECHO LEFT VENTRICLE SYSTOLIC VOLUME (MOD BIPLANE) 2D: 56 ML
TR MAX PG: 17 MMHG
TR PEAK VELOCITY: 2.1 M/S
TRICUSPID ANNULAR PLANE SYSTOLIC EXCURSION: 1.5 CM
TRICUSPID VALVE PEAK REGURGITATION VELOCITY: 2.06 M/S

## 2025-01-24 NOTE — TELEPHONE ENCOUNTER
Pt sp called and was reviewing her Echo results, please call sp to review . Not sure if he needs the calcium score or not.

## 2025-01-28 ENCOUNTER — APPOINTMENT (OUTPATIENT)
Dept: LAB | Facility: CLINIC | Age: 75
End: 2025-01-28
Payer: MEDICARE

## 2025-01-28 DIAGNOSIS — E11.22 TYPE 2 DIABETES MELLITUS WITH STAGE 3A CHRONIC KIDNEY DISEASE, WITH LONG-TERM CURRENT USE OF INSULIN (HCC): ICD-10-CM

## 2025-01-28 DIAGNOSIS — E11.9 TYPE 2 DIABETES MELLITUS WITHOUT COMPLICATIONS (HCC): ICD-10-CM

## 2025-01-28 DIAGNOSIS — E07.9 DISORDER OF THYROID, UNSPECIFIED: ICD-10-CM

## 2025-01-28 DIAGNOSIS — N18.31 TYPE 2 DIABETES MELLITUS WITH STAGE 3A CHRONIC KIDNEY DISEASE, WITH LONG-TERM CURRENT USE OF INSULIN (HCC): ICD-10-CM

## 2025-01-28 DIAGNOSIS — Z79.4 TYPE 2 DIABETES MELLITUS WITH STAGE 3A CHRONIC KIDNEY DISEASE, WITH LONG-TERM CURRENT USE OF INSULIN (HCC): ICD-10-CM

## 2025-01-28 DIAGNOSIS — E78.5 HYPERLIPIDEMIA, UNSPECIFIED: ICD-10-CM

## 2025-01-28 DIAGNOSIS — R79.89 LOW TESTOSTERONE: ICD-10-CM

## 2025-01-28 DIAGNOSIS — Z79.4 TYPE 2 DIABETES MELLITUS WITH HYPERGLYCEMIA, WITH LONG-TERM CURRENT USE OF INSULIN (HCC): ICD-10-CM

## 2025-01-28 DIAGNOSIS — R74.8 ABNORMAL LEVELS OF OTHER SERUM ENZYMES: ICD-10-CM

## 2025-01-28 DIAGNOSIS — I10 PRIMARY HYPERTENSION: ICD-10-CM

## 2025-01-28 DIAGNOSIS — R53.83 OTHER FATIGUE: ICD-10-CM

## 2025-01-28 DIAGNOSIS — E11.65 TYPE 2 DIABETES MELLITUS WITH HYPERGLYCEMIA, WITH LONG-TERM CURRENT USE OF INSULIN (HCC): ICD-10-CM

## 2025-01-28 DIAGNOSIS — I10 ESSENTIAL (PRIMARY) HYPERTENSION: ICD-10-CM

## 2025-01-28 LAB
ALBUMIN SERPL BCG-MCNC: 4 G/DL (ref 3.5–5)
ALP SERPL-CCNC: 82 U/L (ref 34–104)
ALT SERPL W P-5'-P-CCNC: 24 U/L (ref 7–52)
ANION GAP SERPL CALCULATED.3IONS-SCNC: 11 MMOL/L (ref 4–13)
AST SERPL W P-5'-P-CCNC: 27 U/L (ref 13–39)
BASOPHILS # BLD AUTO: 0.06 THOUSANDS/ΜL (ref 0–0.1)
BASOPHILS NFR BLD AUTO: 1 % (ref 0–1)
BILIRUB SERPL-MCNC: 0.59 MG/DL (ref 0.2–1)
BUN SERPL-MCNC: 31 MG/DL (ref 5–25)
CALCIUM SERPL-MCNC: 9.6 MG/DL (ref 8.4–10.2)
CHLORIDE SERPL-SCNC: 103 MMOL/L (ref 96–108)
CHOLEST SERPL-MCNC: 83 MG/DL (ref ?–200)
CK SERPL-CCNC: 164 U/L (ref 39–308)
CO2 SERPL-SCNC: 24 MMOL/L (ref 21–32)
CREAT SERPL-MCNC: 1.16 MG/DL (ref 0.6–1.3)
EOSINOPHIL # BLD AUTO: 0.52 THOUSAND/ΜL (ref 0–0.61)
EOSINOPHIL NFR BLD AUTO: 5 % (ref 0–6)
ERYTHROCYTE [DISTWIDTH] IN BLOOD BY AUTOMATED COUNT: 13.2 % (ref 11.6–15.1)
EST. AVERAGE GLUCOSE BLD GHB EST-MCNC: 220 MG/DL
GFR SERPL CREATININE-BSD FRML MDRD: 62 ML/MIN/1.73SQ M
GLUCOSE P FAST SERPL-MCNC: 198 MG/DL (ref 65–99)
HBA1C MFR BLD: 9.3 %
HCT VFR BLD AUTO: 38.8 % (ref 36.5–49.3)
HDLC SERPL-MCNC: 34 MG/DL
HGB BLD-MCNC: 12.7 G/DL (ref 12–17)
IMM GRANULOCYTES # BLD AUTO: 0.03 THOUSAND/UL (ref 0–0.2)
IMM GRANULOCYTES NFR BLD AUTO: 0 % (ref 0–2)
LDLC SERPL CALC-MCNC: 22 MG/DL (ref 0–100)
LYMPHOCYTES # BLD AUTO: 2.26 THOUSANDS/ΜL (ref 0.6–4.47)
LYMPHOCYTES NFR BLD AUTO: 23 % (ref 14–44)
MCH RBC QN AUTO: 28.6 PG (ref 26.8–34.3)
MCHC RBC AUTO-ENTMCNC: 32.7 G/DL (ref 31.4–37.4)
MCV RBC AUTO: 87 FL (ref 82–98)
MONOCYTES # BLD AUTO: 0.59 THOUSAND/ΜL (ref 0.17–1.22)
MONOCYTES NFR BLD AUTO: 6 % (ref 4–12)
NEUTROPHILS # BLD AUTO: 6.45 THOUSANDS/ΜL (ref 1.85–7.62)
NEUTS SEG NFR BLD AUTO: 65 % (ref 43–75)
NONHDLC SERPL-MCNC: 49 MG/DL
NRBC BLD AUTO-RTO: 0 /100 WBCS
PLATELET # BLD AUTO: 236 THOUSANDS/UL (ref 149–390)
PMV BLD AUTO: 10.3 FL (ref 8.9–12.7)
POTASSIUM SERPL-SCNC: 4.9 MMOL/L (ref 3.5–5.3)
PROLACTIN SERPL-MCNC: 10.26 NG/ML (ref 2.64–13.13)
PROT SERPL-MCNC: 7.1 G/DL (ref 6.4–8.4)
RBC # BLD AUTO: 4.44 MILLION/UL (ref 3.88–5.62)
SODIUM SERPL-SCNC: 138 MMOL/L (ref 135–147)
T4 FREE SERPL-MCNC: 0.64 NG/DL (ref 0.61–1.12)
T4 SERPL-MCNC: 8.55 UG/DL (ref 6.09–12.23)
TRIGL SERPL-MCNC: 136 MG/DL (ref ?–150)
TSH SERPL DL<=0.05 MIU/L-ACNC: 2.61 UIU/ML (ref 0.45–4.5)
WBC # BLD AUTO: 9.91 THOUSAND/UL (ref 4.31–10.16)

## 2025-01-28 PROCEDURE — 36415 COLL VENOUS BLD VENIPUNCTURE: CPT

## 2025-01-28 PROCEDURE — 80061 LIPID PANEL: CPT

## 2025-01-28 PROCEDURE — 84402 ASSAY OF FREE TESTOSTERONE: CPT

## 2025-01-28 PROCEDURE — 80053 COMPREHEN METABOLIC PANEL: CPT

## 2025-01-28 PROCEDURE — 85025 COMPLETE CBC W/AUTO DIFF WBC: CPT

## 2025-01-28 PROCEDURE — 84403 ASSAY OF TOTAL TESTOSTERONE: CPT | Performed by: INTERNAL MEDICINE

## 2025-01-28 PROCEDURE — 84270 ASSAY OF SEX HORMONE GLOBUL: CPT

## 2025-01-28 PROCEDURE — 84479 ASSAY OF THYROID (T3 OR T4): CPT

## 2025-01-28 PROCEDURE — 84443 ASSAY THYROID STIM HORMONE: CPT

## 2025-01-28 PROCEDURE — 84439 ASSAY OF FREE THYROXINE: CPT

## 2025-01-28 PROCEDURE — 84436 ASSAY OF TOTAL THYROXINE: CPT

## 2025-01-28 PROCEDURE — 82550 ASSAY OF CK (CPK): CPT

## 2025-01-28 PROCEDURE — 84146 ASSAY OF PROLACTIN: CPT

## 2025-01-30 ENCOUNTER — TELEPHONE (OUTPATIENT)
Age: 75
End: 2025-01-30

## 2025-01-30 ENCOUNTER — TELEPHONE (OUTPATIENT)
Dept: ENDOCRINOLOGY | Facility: CLINIC | Age: 75
End: 2025-01-30

## 2025-01-30 LAB
SHBG SERPL-SCNC: 30.4 NMOL/L (ref 19.3–76.4)
T3RU NFR SERPL: 26 % (ref 24–39)

## 2025-01-30 NOTE — TELEPHONE ENCOUNTER
Pt. Came in the office for Dexcom download . It is scanned in as Telephone encounter for you to review. Thanks

## 2025-01-31 ENCOUNTER — RESULTS FOLLOW-UP (OUTPATIENT)
Dept: FAMILY MEDICINE CLINIC | Facility: CLINIC | Age: 75
End: 2025-01-31

## 2025-01-31 NOTE — TELEPHONE ENCOUNTER
CGM download reviewed.  Noted to have postprandial hyperglycemia which is more prominent after lunch, dinner  Recommend increasing Humalog to 14 units with meals 3 times a day.  If patient is having a lower carbohydrate meal, should take only 10-12 units  Continue current doses of long-acting insulin

## 2025-02-01 NOTE — TELEPHONE ENCOUNTER
Reviewed ECHO with patient. Recommend still to have CA score. Will see him soon to discuss in person

## 2025-02-03 LAB — MISCELLANEOUS LAB TEST RESULT: NORMAL

## 2025-02-12 ENCOUNTER — RESULTS FOLLOW-UP (OUTPATIENT)
Dept: FAMILY MEDICINE CLINIC | Facility: CLINIC | Age: 75
End: 2025-02-12

## 2025-02-12 ENCOUNTER — APPOINTMENT (OUTPATIENT)
Dept: RADIOLOGY | Facility: CLINIC | Age: 75
End: 2025-02-12
Payer: MEDICARE

## 2025-02-12 ENCOUNTER — APPOINTMENT (OUTPATIENT)
Dept: LAB | Facility: CLINIC | Age: 75
End: 2025-02-12
Payer: MEDICARE

## 2025-02-12 ENCOUNTER — OFFICE VISIT (OUTPATIENT)
Dept: FAMILY MEDICINE CLINIC | Facility: CLINIC | Age: 75
End: 2025-02-12
Payer: MEDICARE

## 2025-02-12 VITALS
OXYGEN SATURATION: 96 % | HEIGHT: 73 IN | DIASTOLIC BLOOD PRESSURE: 46 MMHG | BODY MASS INDEX: 36.31 KG/M2 | HEART RATE: 88 BPM | SYSTOLIC BLOOD PRESSURE: 82 MMHG | WEIGHT: 274 LBS | TEMPERATURE: 96.1 F | RESPIRATION RATE: 18 BRPM

## 2025-02-12 DIAGNOSIS — E11.22 TYPE 2 DIABETES MELLITUS WITH STAGE 3A CHRONIC KIDNEY DISEASE, WITH LONG-TERM CURRENT USE OF INSULIN (HCC): ICD-10-CM

## 2025-02-12 DIAGNOSIS — U07.1 COVID: ICD-10-CM

## 2025-02-12 DIAGNOSIS — Z79.4 TYPE 2 DIABETES MELLITUS WITH STAGE 3A CHRONIC KIDNEY DISEASE, WITH LONG-TERM CURRENT USE OF INSULIN (HCC): ICD-10-CM

## 2025-02-12 DIAGNOSIS — J06.9 UPPER RESPIRATORY TRACT INFECTION, UNSPECIFIED TYPE: ICD-10-CM

## 2025-02-12 DIAGNOSIS — N18.31 TYPE 2 DIABETES MELLITUS WITH STAGE 3A CHRONIC KIDNEY DISEASE, WITH LONG-TERM CURRENT USE OF INSULIN (HCC): ICD-10-CM

## 2025-02-12 DIAGNOSIS — J06.9 UPPER RESPIRATORY TRACT INFECTION, UNSPECIFIED TYPE: Primary | ICD-10-CM

## 2025-02-12 LAB
BASOPHILS # BLD AUTO: 0.05 THOUSANDS/ΜL (ref 0–0.1)
BASOPHILS NFR BLD AUTO: 1 % (ref 0–1)
EOSINOPHIL # BLD AUTO: 0.37 THOUSAND/ΜL (ref 0–0.61)
EOSINOPHIL NFR BLD AUTO: 4 % (ref 0–6)
ERYTHROCYTE [DISTWIDTH] IN BLOOD BY AUTOMATED COUNT: 13.6 % (ref 11.6–15.1)
HCT VFR BLD AUTO: 39.2 % (ref 36.5–49.3)
HGB BLD-MCNC: 12.8 G/DL (ref 12–17)
IMM GRANULOCYTES # BLD AUTO: 0.04 THOUSAND/UL (ref 0–0.2)
IMM GRANULOCYTES NFR BLD AUTO: 0 % (ref 0–2)
LYMPHOCYTES # BLD AUTO: 1.65 THOUSANDS/ΜL (ref 0.6–4.47)
LYMPHOCYTES NFR BLD AUTO: 18 % (ref 14–44)
MCH RBC QN AUTO: 28.9 PG (ref 26.8–34.3)
MCHC RBC AUTO-ENTMCNC: 32.7 G/DL (ref 31.4–37.4)
MCV RBC AUTO: 89 FL (ref 82–98)
MONOCYTES # BLD AUTO: 1.04 THOUSAND/ΜL (ref 0.17–1.22)
MONOCYTES NFR BLD AUTO: 11 % (ref 4–12)
NEUTROPHILS # BLD AUTO: 6.22 THOUSANDS/ΜL (ref 1.85–7.62)
NEUTS SEG NFR BLD AUTO: 66 % (ref 43–75)
NRBC BLD AUTO-RTO: 0 /100 WBCS
PLATELET # BLD AUTO: 241 THOUSANDS/UL (ref 149–390)
PMV BLD AUTO: 10.3 FL (ref 8.9–12.7)
RBC # BLD AUTO: 4.43 MILLION/UL (ref 3.88–5.62)
SARS-COV-2 AG UPPER RESP QL IA: POSITIVE
SL AMB POCT RAPID FLU A: NORMAL
SL AMB POCT RAPID FLU B: NORMAL
VALID CONTROL: ABNORMAL
WBC # BLD AUTO: 9.37 THOUSAND/UL (ref 4.31–10.16)

## 2025-02-12 PROCEDURE — 99214 OFFICE O/P EST MOD 30 MIN: CPT | Performed by: FAMILY MEDICINE

## 2025-02-12 PROCEDURE — 87804 INFLUENZA ASSAY W/OPTIC: CPT | Performed by: FAMILY MEDICINE

## 2025-02-12 PROCEDURE — 71046 X-RAY EXAM CHEST 2 VIEWS: CPT

## 2025-02-12 PROCEDURE — 87811 SARS-COV-2 COVID19 W/OPTIC: CPT | Performed by: FAMILY MEDICINE

## 2025-02-12 PROCEDURE — 85025 COMPLETE CBC W/AUTO DIFF WBC: CPT

## 2025-02-12 PROCEDURE — 80053 COMPREHEN METABOLIC PANEL: CPT

## 2025-02-12 PROCEDURE — 36415 COLL VENOUS BLD VENIPUNCTURE: CPT

## 2025-02-12 PROCEDURE — G2211 COMPLEX E/M VISIT ADD ON: HCPCS | Performed by: FAMILY MEDICINE

## 2025-02-12 RX ORDER — AZITHROMYCIN 250 MG/1
TABLET, FILM COATED ORAL
Qty: 6 TABLET | Refills: 0 | Status: SHIPPED | OUTPATIENT
Start: 2025-02-12 | End: 2025-02-19

## 2025-02-12 NOTE — ASSESSMENT & PLAN NOTE
Given that patient is reducing his intake of food secondary to being ill monitor for low sugars  Continue faithfully using CGM to help monitor sugars  Lab Results   Component Value Date    HGBA1C 9.3 (H) 01/28/2025

## 2025-02-12 NOTE — PROGRESS NOTES
Name: Donovan Burnette      : 1950      MRN: [id number]  Encounter Provider: Carolynn Foster MD  Encounter Date: 2025   Encounter department: [facility]  :  Assessment & Plan  Upper respiratory tract infection, unspecified type  COVID-19 positive today.  Sent in patient a Z-Rakesh to be initiated.  Patient already outside the window of treatment.3  I did stress the importance of hydration and resting.  Patient's blood pressure is low today.  Recommend monitoring at home if it continues to remain low or patient has any near syncopal episodes I highly recommend going to the emergency room  Orders:  •  XR chest pa and lateral; Future  •  CBC and differential; Future  •  Comprehensive metabolic panel; Future  •  POCT Rapid Covid Ag  •  POCT rapid flu A and B  •  azithromycin (ZITHROMAX) 250 mg tablet; Take 2 tablets today then 1 tablet daily x 4 days  •  azithromycin (ZITHROMAX) 250 mg tablet; Take 2 tablets today then 1 tablet daily x 4 days    Type 2 diabetes mellitus with stage 3a chronic kidney disease, with long-term current use of insulin (HCC)  Given that patient is reducing his intake of food secondary to being ill monitor for low sugars  Continue faithfully using CGM to help monitor sugars  Lab Results   Component Value Date    HGBA1C 9.3 (H) 2025          COVID    Orders:  •  azithromycin (ZITHROMAX) 250 mg tablet; Take 2 tablets today then 1 tablet daily x 4 days           History of Present Illness   HPI  Patient started feeling unwell on Friday started with a cough sore throat headache congestion dizziness body aches.  Patient states that he just feels weak and not feeling like himself.  He states that he is taking his blood sugars and states that they are usually within normal range but recently since he has not been eating that much has been a lot better in the 160s.  Continues to be using his CGM      Review of Systems   Constitutional:  Negative for  "activity change, appetite change, chills, fatigue and fever.   HENT:  Negative for congestion.    Respiratory:  Negative for cough, chest tightness and shortness of breath.    Cardiovascular:  Negative for chest pain and leg swelling.   Gastrointestinal:  Negative for abdominal distention, abdominal pain, constipation, diarrhea, nausea and vomiting.   All other systems reviewed and are negative.      Objective   BP (!) 82/46 (BP Location: Left arm, Patient Position: Sitting, Cuff Size: Large)   Pulse 88   Temp (!) 96.1 °F (35.6 °C) (Temporal)   Resp 18   Ht 6' 1\" (1.854 m)   Wt 124 kg (274 lb)   SpO2 96%   BMI 36.15 kg/m²      Physical Exam  Vitals reviewed.   Constitutional:       Appearance: He is well-developed.   HENT:      Head: Normocephalic and atraumatic.      Right Ear: Tympanic membrane, ear canal and external ear normal.      Left Ear: Tympanic membrane, ear canal and external ear normal.      Nose: Nose normal.      Mouth/Throat:      Mouth: Mucous membranes are moist.   Eyes:      Conjunctiva/sclera: Conjunctivae normal.      Pupils: Pupils are equal, round, and reactive to light.   Cardiovascular:      Rate and Rhythm: Normal rate and regular rhythm.      Heart sounds: Normal heart sounds.   Pulmonary:      Effort: Pulmonary effort is normal.      Breath sounds: Wheezing present.   Abdominal:      General: Bowel sounds are normal. There is no distension.      Palpations: Abdomen is soft. There is no mass.      Tenderness: There is no abdominal tenderness.   Genitourinary:     Penis: Normal.    Musculoskeletal:         General: No tenderness. Normal range of motion.      Cervical back: Normal range of motion and neck supple.   Skin:     General: Skin is warm.      Capillary Refill: Capillary refill takes less than 2 seconds.   Neurological:      Mental Status: He is alert and oriented to person, place, and time.      Cranial Nerves: No cranial nerve deficit.      Sensory: No sensory deficit.      " Motor: No abnormal muscle tone.      Coordination: Coordination normal.      Deep Tendon Reflexes: Reflexes normal.   Psychiatric:         Behavior: Behavior normal.         Thought Content: Thought content normal.         Judgment: Judgment normal.

## 2025-02-13 LAB
ALBUMIN SERPL BCG-MCNC: 4.1 G/DL (ref 3.5–5)
ALP SERPL-CCNC: 88 U/L (ref 34–104)
ALT SERPL W P-5'-P-CCNC: 23 U/L (ref 7–52)
ANION GAP SERPL CALCULATED.3IONS-SCNC: 11 MMOL/L (ref 4–13)
AST SERPL W P-5'-P-CCNC: 30 U/L (ref 13–39)
BILIRUB SERPL-MCNC: 0.91 MG/DL (ref 0.2–1)
BUN SERPL-MCNC: 35 MG/DL (ref 5–25)
CALCIUM SERPL-MCNC: 9.8 MG/DL (ref 8.4–10.2)
CHLORIDE SERPL-SCNC: 101 MMOL/L (ref 96–108)
CO2 SERPL-SCNC: 26 MMOL/L (ref 21–32)
CREAT SERPL-MCNC: 1.83 MG/DL (ref 0.6–1.3)
GFR SERPL CREATININE-BSD FRML MDRD: 35 ML/MIN/1.73SQ M
GLUCOSE P FAST SERPL-MCNC: 152 MG/DL (ref 65–99)
POTASSIUM SERPL-SCNC: 4.5 MMOL/L (ref 3.5–5.3)
PROT SERPL-MCNC: 7.1 G/DL (ref 6.4–8.4)
SODIUM SERPL-SCNC: 138 MMOL/L (ref 135–147)

## 2025-02-19 ENCOUNTER — OFFICE VISIT (OUTPATIENT)
Dept: FAMILY MEDICINE CLINIC | Facility: CLINIC | Age: 75
End: 2025-02-19
Payer: MEDICARE

## 2025-02-19 VITALS
TEMPERATURE: 98 F | SYSTOLIC BLOOD PRESSURE: 112 MMHG | BODY MASS INDEX: 36.31 KG/M2 | HEIGHT: 73 IN | WEIGHT: 274 LBS | RESPIRATION RATE: 20 BRPM | DIASTOLIC BLOOD PRESSURE: 56 MMHG | OXYGEN SATURATION: 96 % | HEART RATE: 72 BPM

## 2025-02-19 DIAGNOSIS — N18.31 TYPE 2 DIABETES MELLITUS WITH STAGE 3A CHRONIC KIDNEY DISEASE, WITH LONG-TERM CURRENT USE OF INSULIN (HCC): ICD-10-CM

## 2025-02-19 DIAGNOSIS — G89.29 OTHER CHRONIC PAIN: ICD-10-CM

## 2025-02-19 DIAGNOSIS — U07.1 COVID: ICD-10-CM

## 2025-02-19 DIAGNOSIS — Z79.4 TYPE 2 DIABETES MELLITUS WITH STAGE 3A CHRONIC KIDNEY DISEASE, WITH LONG-TERM CURRENT USE OF INSULIN (HCC): ICD-10-CM

## 2025-02-19 DIAGNOSIS — E11.22 TYPE 2 DIABETES MELLITUS WITH STAGE 3A CHRONIC KIDNEY DISEASE, WITH LONG-TERM CURRENT USE OF INSULIN (HCC): ICD-10-CM

## 2025-02-19 DIAGNOSIS — F51.01 PRIMARY INSOMNIA: ICD-10-CM

## 2025-02-19 DIAGNOSIS — I10 PRIMARY HYPERTENSION: Primary | ICD-10-CM

## 2025-02-19 PROCEDURE — 99214 OFFICE O/P EST MOD 30 MIN: CPT | Performed by: FAMILY MEDICINE

## 2025-02-19 PROCEDURE — G2211 COMPLEX E/M VISIT ADD ON: HCPCS | Performed by: FAMILY MEDICINE

## 2025-02-19 RX ORDER — TRAMADOL HYDROCHLORIDE 50 MG/1
50 TABLET ORAL DAILY PRN
Qty: 30 TABLET | Refills: 4 | Status: SHIPPED | OUTPATIENT
Start: 2025-02-19

## 2025-02-19 RX ORDER — ZOLPIDEM TARTRATE 12.5 MG/1
12.5 TABLET, FILM COATED, EXTENDED RELEASE ORAL
Qty: 30 TABLET | Refills: 4 | Status: SHIPPED | OUTPATIENT
Start: 2025-02-19

## 2025-02-19 NOTE — PROGRESS NOTES
Name: Donovan Burnette      : 1950      MRN: [id number]  Encounter Provider: Carolynn Foster MD  Encounter Date: 2025   Encounter department: [facility]  :  Assessment & Plan  Primary hypertension  Currently stable.  Does have low BP when he is ill.  Being managed by cardiology therefore no changes to be made at this time recommend monitoring at home.       Type 2 diabetes mellitus with stage 3a chronic kidney disease, with long-term current use of insulin (HCC)  Given patient was just acutely ill unfortunately I do not want to make any changes  Lab Results   Component Value Date    HGBA1C 9.3 (H) 2025          COVID         Other chronic pain  Chronic pain refill tramadol today  Orders:  •  traMADol (ULTRAM) 50 mg tablet; Take 1 tablet (50 mg total) by mouth daily as needed for moderate pain    Primary insomnia  Primary insomnia refilled medications.  Was discontinued by his former PCP.  Restarted  Orders:  •  zolpidem (AMBIEN CR) 12.5 MG CR tablet; Take 1 tablet (12.5 mg total) by mouth daily at bedtime as needed for sleep           History of Present Illness   HPI  75-year-old male presenting to the office after being evaluated for COVID-19.  States that he continues to have thick mucus but not coughing as much as he was.  Patient states that he is tired but does not have any fevers chills and feels like his symptoms improved.  Patient states that he has not had a refill of his Ambien and would like that to be continued if possible.  Has been seen by his cardiologist.  He also has been in pain and would like a refill of his tramadol for his chronic conditions of his arthritis  Review of Systems   Constitutional:  Negative for activity change, appetite change, chills, fatigue and fever.   HENT:  Negative for congestion.    Respiratory:  Negative for cough, chest tightness and shortness of breath.    Cardiovascular:  Negative for chest pain and leg swelling.  "  Gastrointestinal:  Negative for abdominal distention, abdominal pain, constipation, diarrhea, nausea and vomiting.   Musculoskeletal:  Positive for arthralgias.   All other systems reviewed and are negative.      Objective   /56 (BP Location: Right arm, Patient Position: Sitting, Cuff Size: Large)   Pulse 72   Temp 98 °F (36.7 °C) (Temporal)   Resp 20   Ht 6' 1\" (1.854 m)   Wt 124 kg (274 lb)   SpO2 96%   BMI 36.15 kg/m²      Physical Exam  Vitals reviewed.   Constitutional:       Appearance: He is well-developed.   HENT:      Head: Normocephalic and atraumatic.      Right Ear: Tympanic membrane, ear canal and external ear normal.      Left Ear: Tympanic membrane, ear canal and external ear normal.      Nose: Nose normal.      Mouth/Throat:      Mouth: Mucous membranes are moist.   Eyes:      Conjunctiva/sclera: Conjunctivae normal.      Pupils: Pupils are equal, round, and reactive to light.   Cardiovascular:      Rate and Rhythm: Normal rate and regular rhythm.      Heart sounds: Normal heart sounds.   Pulmonary:      Effort: Pulmonary effort is normal.      Breath sounds: Normal breath sounds. No wheezing.   Musculoskeletal:         General: No tenderness. Normal range of motion.      Cervical back: Normal range of motion and neck supple.   Skin:     General: Skin is warm.      Capillary Refill: Capillary refill takes less than 2 seconds.   Neurological:      Mental Status: He is alert and oriented to person, place, and time.      Sensory: No sensory deficit.      Motor: No abnormal muscle tone.      Coordination: Coordination normal.   Psychiatric:         Behavior: Behavior normal.         Thought Content: Thought content normal.         Judgment: Judgment normal.         "

## 2025-02-19 NOTE — ASSESSMENT & PLAN NOTE
Given patient was just acutely ill unfortunately I do not want to make any changes  Lab Results   Component Value Date    HGBA1C 9.3 (H) 01/28/2025

## 2025-02-19 NOTE — ASSESSMENT & PLAN NOTE
Currently stable.  Does have low BP when he is ill.  Being managed by cardiology therefore no changes to be made at this time recommend monitoring at home.

## 2025-02-24 DIAGNOSIS — Z79.4 TYPE 2 DIABETES MELLITUS WITH STAGE 3A CHRONIC KIDNEY DISEASE, WITH LONG-TERM CURRENT USE OF INSULIN (HCC): ICD-10-CM

## 2025-02-24 DIAGNOSIS — N18.31 TYPE 2 DIABETES MELLITUS WITH STAGE 3A CHRONIC KIDNEY DISEASE, WITH LONG-TERM CURRENT USE OF INSULIN (HCC): ICD-10-CM

## 2025-02-24 DIAGNOSIS — E11.22 TYPE 2 DIABETES MELLITUS WITH STAGE 3A CHRONIC KIDNEY DISEASE, WITH LONG-TERM CURRENT USE OF INSULIN (HCC): ICD-10-CM

## 2025-02-24 RX ORDER — INSULIN LISPRO 100 U/ML
10 INJECTION, SOLUTION SUBCUTANEOUS
Qty: 30 ML | Refills: 1 | Status: CANCELLED | OUTPATIENT
Start: 2025-02-24

## 2025-02-28 ENCOUNTER — RESULTS FOLLOW-UP (OUTPATIENT)
Dept: OTHER | Facility: HOSPITAL | Age: 75
End: 2025-02-28

## 2025-03-11 ENCOUNTER — OFFICE VISIT (OUTPATIENT)
Age: 75
End: 2025-03-11
Payer: MEDICARE

## 2025-03-11 VITALS — WEIGHT: 274 LBS | HEIGHT: 73 IN | BODY MASS INDEX: 36.31 KG/M2 | RESPIRATION RATE: 17 BRPM

## 2025-03-11 DIAGNOSIS — M77.41 METATARSALGIA OF BOTH FEET: Primary | ICD-10-CM

## 2025-03-11 DIAGNOSIS — M79.671 PAIN IN BOTH FEET: ICD-10-CM

## 2025-03-11 DIAGNOSIS — Z79.4 TYPE 2 DIABETES MELLITUS WITH STAGE 3A CHRONIC KIDNEY DISEASE, WITH LONG-TERM CURRENT USE OF INSULIN (HCC): ICD-10-CM

## 2025-03-11 DIAGNOSIS — E11.42 DIABETIC POLYNEUROPATHY ASSOCIATED WITH TYPE 2 DIABETES MELLITUS (HCC): ICD-10-CM

## 2025-03-11 DIAGNOSIS — B35.9 DERMATOPHYTOSIS: ICD-10-CM

## 2025-03-11 DIAGNOSIS — M79.672 PAIN IN BOTH FEET: ICD-10-CM

## 2025-03-11 DIAGNOSIS — E11.22 TYPE 2 DIABETES MELLITUS WITH STAGE 3A CHRONIC KIDNEY DISEASE, WITH LONG-TERM CURRENT USE OF INSULIN (HCC): ICD-10-CM

## 2025-03-11 DIAGNOSIS — N18.31 TYPE 2 DIABETES MELLITUS WITH STAGE 3A CHRONIC KIDNEY DISEASE, WITH LONG-TERM CURRENT USE OF INSULIN (HCC): ICD-10-CM

## 2025-03-11 DIAGNOSIS — M77.42 METATARSALGIA OF BOTH FEET: Primary | ICD-10-CM

## 2025-03-11 DIAGNOSIS — B35.1 ONYCHOMYCOSIS: ICD-10-CM

## 2025-03-11 PROCEDURE — 99203 OFFICE O/P NEW LOW 30 MIN: CPT | Performed by: PODIATRIST

## 2025-03-11 RX ORDER — KETOCONAZOLE 20 MG/G
CREAM TOPICAL DAILY
Qty: 60 G | Refills: 6 | Status: SHIPPED | OUTPATIENT
Start: 2025-03-11 | End: 2025-04-10

## 2025-03-11 NOTE — PROGRESS NOTES
Assessment/Plan: Metatarsalgia secondary to diabetic neuropathy.  Rule out radiculopathy.  Mycosis of nail and skin.    Plan.  Chart reviewed.  PCP notes reviewed.  Diabetic foot exam performed.  Patient educated on care of the diabetic foot.  Patient and family advised on nail cutting instruction.  At this time we recommend addition of topical antifungal.  Ketoconazole ordered.  Patient to remain on gabapentin.  He will consider alpha lipoic acid.  In addition patient family will spray shoes weekly with Lysol.  Watch for signs of infection or ulceration.  Aftercare instruction given.  Return for follow-up.         Diagnoses and all orders for this visit:    Metatarsalgia of both feet    Type 2 diabetes mellitus with stage 3a chronic kidney disease, with long-term current use of insulin (HCC)  -     Ambulatory referral to Podiatry    Diabetic polyneuropathy associated with type 2 diabetes mellitus (HCC)    Dermatophytosis  -     ketoconazole (NIZORAL) 2 % cream; Apply topically daily    Pain in both feet    Onychomycosis  -     ketoconazole (NIZORAL) 2 % cream; Apply topically daily          Subjective: Patient is seen on referral from primary care.  Patient is diabetic.  He has no neuropathy.  He is taking gabapentin.  He still gets some numbness in his feet.            No Known Allergies      Current Outpatient Medications:     Admelog SoloStar 100 units/mL injection pen, Inject 10 Units under the skin 3 (three) times a day with meals (Patient taking differently: Inject 10 Units under the skin 3 (three) times a day with meals 14 units before every meal -if it does go down in 2 hours add 2-5 more units), Disp: 30 mL, Rfl: 1    atorvastatin (LIPITOR) 80 mg tablet, Take 40 mg by mouth daily, Disp: , Rfl:     BD Pen Needle Bernice 2nd Gen 32G X 4 MM MISC, use 1 PEN NEEDLE to inject MEDICATION subcutaneously four times a day, Disp: , Rfl:     cholecalciferol (VITAMIN D3) 400 units tablet, Take 400 Units by mouth daily,  Disp: , Rfl:     Continuous Glucose Sensor (Dexcom G7 Sensor), Use 1 Device every 10 days, Disp: 9 each, Rfl: 3    gabapentin (NEURONTIN) 300 mg capsule, Take 600 mg by mouth 2 (two) times a day, Disp: , Rfl:     hydrochlorothiazide (HYDRODIURIL) 25 mg tablet, Take 25 mg by mouth daily, Disp: , Rfl:     ketoconazole (NIZORAL) 2 % cream, Apply topically daily, Disp: 60 g, Rfl: 6    Lantus SoloStar 100 units/mL SOPN, Inject Lantus 60 units subcutaneously twice a day, Disp: 45 mL, Rfl: 1    metFORMIN (GLUCOPHAGE) 1000 MG tablet, Take 1,000 mg by mouth 2 (two) times a day with meals, Disp: , Rfl:     traMADol (ULTRAM) 50 mg tablet, Take 1 tablet (50 mg total) by mouth daily as needed for moderate pain, Disp: 30 tablet, Rfl: 4    valsartan (DIOVAN) 320 MG tablet, Take 320 mg by mouth daily, Disp: , Rfl:     zolpidem (AMBIEN CR) 12.5 MG CR tablet, Take 1 tablet (12.5 mg total) by mouth daily at bedtime as needed for sleep, Disp: 30 tablet, Rfl: 4    rOPINIRole (REQUIP) 0.25 mg tablet, take 1 tablet by mouth at bedtime (Patient not taking: Reported on 3/11/2025), Disp: 30 tablet, Rfl: 5    Patient Active Problem List   Diagnosis    Syncope    Primary hypertension    Obesity, morbid (HCC)    Platelets decreased ([facility])    Stage 3a chronic kidney disease (HCC)    Type 2 diabetes mellitus with stage 3a chronic kidney disease, with long-term current use of insulin (HCC)    Type 2 diabetes mellitus with hyperglycemia, with long-term current use of insulin (HCC)    Low testosterone    Class 2 severe obesity with serious comorbidity and body mass index (BMI) of 37.0 to 37.9 in adult ([facility])          Patient ID: Donovan Burnette is a 75 y.o. male.    HPI    The following portions of the patient's history were reviewed and updated as appropriate: He  has a past medical history of Arthritis, Diabetes mellitus (HCC), Flu (2023), Hypertension, and Kidney stone.  He   Patient Active Problem List    Diagnosis Date Noted    Type 2 diabetes  mellitus with hyperglycemia, with long-term current use of insulin ([facility]) 01/14/2025    Low testosterone 01/14/2025    Class 2 severe obesity with serious comorbidity and body mass index (BMI) of 37.0 to 37.9 in adult ([facility]) 01/14/2025    Type 2 diabetes mellitus with stage 3a chronic kidney disease, with long-term current use of insulin ([facility]) 10/07/2024    Obesity, morbid ([facility]) 05/14/2024    Platelets decreased ([facility]) 05/14/2024    Stage 3a chronic kidney disease ([facility]) 05/14/2024    Primary hypertension 12/09/2023    Syncope 12/08/2023     He  has a past surgical history that includes Cholecystectomy; Hernia repair; Shoulder surgery (Bilateral); and Tonsillectomy.  His family history includes Cancer in his mother; Diabetes type II in his brother and maternal aunt; No Known Problems in his daughter, daughter, sister, son, son, and son; amputation in his brother.  He  reports that he quit smoking about 43 years ago. His smoking use included cigarettes. He started smoking about 55 years ago. He has a 12 pack-year smoking history. He has never used smokeless tobacco. He reports current alcohol use of about 2.0 standard drinks of alcohol per week. He reports that he does not use drugs.  Current Outpatient Medications   Medication Sig Dispense Refill    Admelog SoloStar 100 units/mL injection pen Inject 10 Units under the skin 3 (three) times a day with meals (Patient taking differently: Inject 10 Units under the skin 3 (three) times a day with meals 14 units before every meal -if it does go down in 2 hours add 2-5 more units) 30 mL 1    atorvastatin (LIPITOR) 80 mg tablet Take 40 mg by mouth daily      BD Pen Needle Bernice 2nd Gen 32G X 4 MM MISC use 1 PEN NEEDLE to inject MEDICATION subcutaneously four times a day      cholecalciferol (VITAMIN D3) 400 units tablet Take 400 Units by mouth daily      Continuous Glucose Sensor (Dexcom G7 Sensor) Use 1 Device every 10 days 9 each 3    gabapentin (NEURONTIN) 300 mg capsule Take  600 mg by mouth 2 (two) times a day      hydrochlorothiazide (HYDRODIURIL) 25 mg tablet Take 25 mg by mouth daily      ketoconazole (NIZORAL) 2 % cream Apply topically daily 60 g 6    Lantus SoloStar 100 units/mL SOPN Inject Lantus 60 units subcutaneously twice a day 45 mL 1    metFORMIN (GLUCOPHAGE) 1000 MG tablet Take 1,000 mg by mouth 2 (two) times a day with meals      traMADol (ULTRAM) 50 mg tablet Take 1 tablet (50 mg total) by mouth daily as needed for moderate pain 30 tablet 4    valsartan (DIOVAN) 320 MG tablet Take 320 mg by mouth daily      zolpidem (AMBIEN CR) 12.5 MG CR tablet Take 1 tablet (12.5 mg total) by mouth daily at bedtime as needed for sleep 30 tablet 4    rOPINIRole (REQUIP) 0.25 mg tablet take 1 tablet by mouth at bedtime (Patient not taking: Reported on 3/11/2025) 30 tablet 5     No current facility-administered medications for this visit.     Current Outpatient Medications on File Prior to Visit   Medication Sig    Admelog SoloStar 100 units/mL injection pen Inject 10 Units under the skin 3 (three) times a day with meals (Patient taking differently: Inject 10 Units under the skin 3 (three) times a day with meals 14 units before every meal -if it does go down in 2 hours add 2-5 more units)    atorvastatin (LIPITOR) 80 mg tablet Take 40 mg by mouth daily    BD Pen Needle Bernice 2nd Gen 32G X 4 MM MISC use 1 PEN NEEDLE to inject MEDICATION subcutaneously four times a day    cholecalciferol (VITAMIN D3) 400 units tablet Take 400 Units by mouth daily    Continuous Glucose Sensor (Dexcom G7 Sensor) Use 1 Device every 10 days    gabapentin (NEURONTIN) 300 mg capsule Take 600 mg by mouth 2 (two) times a day    hydrochlorothiazide (HYDRODIURIL) 25 mg tablet Take 25 mg by mouth daily    Lantus SoloStar 100 units/mL SOPN Inject Lantus 60 units subcutaneously twice a day    metFORMIN (GLUCOPHAGE) 1000 MG tablet Take 1,000 mg by mouth 2 (two) times a day with meals    traMADol (ULTRAM) 50 mg tablet Take  1 tablet (50 mg total) by mouth daily as needed for moderate pain    valsartan (DIOVAN) 320 MG tablet Take 320 mg by mouth daily    zolpidem (AMBIEN CR) 12.5 MG CR tablet Take 1 tablet (12.5 mg total) by mouth daily at bedtime as needed for sleep    rOPINIRole (REQUIP) 0.25 mg tablet take 1 tablet by mouth at bedtime (Patient not taking: Reported on 3/11/2025)     No current facility-administered medications on file prior to visit.     He has no known allergies..    Vitals:    03/11/25 1128   Resp: 17       Review of Systems      Objective:  Patient's shoes and socks removed.   Foot Exam    General  General Appearance: appears stated age and healthy   Orientation: alert and oriented to person, place, and time   Affect: appropriate   Gait: antalgic   Assistance: cane use       Right Foot/Ankle     Inspection and Palpation  Tenderness: metatarsals   Swelling: dorsum   Arch: pes planus  Skin Exam: dry skin and tinea;     Neurovascular  Dorsalis pedis: 3+  Posterior tibial: 2+  Saphenous nerve sensation: absent  Tibial nerve sensation: absent  Superficial peroneal nerve sensation: absent  Deep peroneal nerve sensation: absent  Sural nerve sensation: absent      Left Foot/Ankle      Inspection and Palpation  Tenderness: metatarsals   Swelling: dorsum   Arch: pes planus  Hammertoes: fifth toe  Skin Exam: dry skin and tinea;     Neurovascular  Dorsalis pedis: 3+  Posterior tibial: 2+  Saphenous nerve sensation: absent  Tibial nerve sensation: absent  Superficial peroneal nerve sensation: absent  Deep peroneal nerve sensation: absent  Sural nerve sensation: absent      Physical Exam  Vitals and nursing note reviewed.   Constitutional:       Appearance: Normal appearance.   Cardiovascular:      Rate and Rhythm: Normal rate and regular rhythm.      Pulses: no weak pulses.           Dorsalis pedis pulses are 3+ on the right side and 3+ on the left side.        Posterior tibial pulses are 2+ on the right side and 2+ on the left  side.   Feet:      Right foot:      Skin integrity: Dry skin present.      Left foot:      Skin integrity: Dry skin present.   Skin:     Capillary Refill: Capillary refill takes less than 2 seconds.      Comments: Patient has severe xerosis of skin.  This is consistent with dermatophytosis.  May need to rule out atypical variant psoriasis.    All nails are dystrophic.  They demonstrate distal mycosis.   Neurological:      Mental Status: He is alert.   Psychiatric:         Mood and Affect: Mood normal.         Behavior: Behavior normal.         Thought Content: Thought content normal.         Judgment: Judgment normal.     Patient's shoes and socks removed.    Right Foot/Ankle   Right Foot Inspection  Skin Exam: dry skin and pre-ulcer.     Toe Exam: swelling and right toe deformity.     Sensory   Vibration: absent  Proprioception: diminished  Monofilament testing: diminished    Vascular  Capillary refills: < 3 seconds  The right DP pulse is 3+. The right PT pulse is 2+.     Right Toe  - Comprehensive Exam  Arch: pes planus  Swelling: dorsum   Tenderness: metatarsals       Left Foot/Ankle  Left Foot Inspection  Skin Exam: dry skin and pre-ulcer.     Toe Exam: swelling and left toe deformity.     Sensory   Vibration: absent  Proprioception: diminished  Monofilament testing: diminished    Vascular  Capillary refills: < 3 seconds  The left DP pulse is 3+. The left PT pulse is 2+.     Left Toe  - Comprehensive Exam  Arch: pes planus  Hammertoes: fifth toe  Swelling: dorsum   Tenderness: metatarsals       Assign Risk Category  Deformity present  Loss of protective sensation  No weak pulses  Risk: 2

## 2025-03-25 ENCOUNTER — TELEPHONE (OUTPATIENT)
Age: 75
End: 2025-03-25

## 2025-03-25 NOTE — TELEPHONE ENCOUNTER
Eulalio from [facility] called in stating they're still waiting for clinical notes request that was faxed to the office on 1/14/25 for the pt's Dexcom G7.

## 2025-03-26 NOTE — TELEPHONE ENCOUNTER
[facility] [employer] [place] called to request to have clinical notes for pt's Dexcom G7 sensors refaxed to them at [phone].  Notes refaxed today.

## 2025-03-27 NOTE — TELEPHONE ENCOUNTER
[facility] called to verify the dexcom fax night were faxed, confirmed were sent 03/25 and again yesterday and also confirmed the fax number that was supplied to us was correct

## 2025-03-28 ENCOUNTER — TELEPHONE (OUTPATIENT)
Age: 75
End: 2025-03-28

## 2025-03-28 ENCOUNTER — APPOINTMENT (OUTPATIENT)
Dept: RADIOLOGY | Facility: CLINIC | Age: 75
End: 2025-03-28
Payer: MEDICARE

## 2025-03-28 DIAGNOSIS — M25.472 LEFT ANKLE SWELLING: ICD-10-CM

## 2025-03-28 DIAGNOSIS — J01.80 OTHER ACUTE SINUSITIS, RECURRENCE NOT SPECIFIED: Primary | ICD-10-CM

## 2025-03-28 DIAGNOSIS — S99.912D INJURY OF LEFT ANKLE, SUBSEQUENT ENCOUNTER: ICD-10-CM

## 2025-03-28 PROCEDURE — 73610 X-RAY EXAM OF ANKLE: CPT

## 2025-03-28 RX ORDER — AZITHROMYCIN 250 MG/1
TABLET, FILM COATED ORAL
Qty: 6 TABLET | Refills: 0 | Status: SHIPPED | OUTPATIENT
Start: 2025-03-28 | End: 2025-04-04

## 2025-03-28 NOTE — TELEPHONE ENCOUNTER
Patients spouse called in wanting to know if the results of his XRAY came back yet. Advised XRAY is still pending. Ashley would like to know if there is anything Bill can do for his ankle to help until the results come back. Patient does have his ankle wrapped in an ace bandage. Please advise. Thank you.

## 2025-03-30 NOTE — PROGRESS NOTES
Patient came in with wife. Twisted left ankle, and now having discomfort. Patient states that he has been limping. Lingering cough from COVID

## 2025-03-31 ENCOUNTER — RESULTS FOLLOW-UP (OUTPATIENT)
Dept: FAMILY MEDICINE CLINIC | Facility: CLINIC | Age: 75
End: 2025-03-31

## 2025-04-04 ENCOUNTER — OFFICE VISIT (OUTPATIENT)
Dept: ENDOCRINOLOGY | Facility: CLINIC | Age: 75
End: 2025-04-04
Payer: MEDICARE

## 2025-04-04 VITALS
OXYGEN SATURATION: 97 % | HEIGHT: 73 IN | BODY MASS INDEX: 36.79 KG/M2 | WEIGHT: 277.6 LBS | SYSTOLIC BLOOD PRESSURE: 120 MMHG | HEART RATE: 71 BPM | DIASTOLIC BLOOD PRESSURE: 61 MMHG

## 2025-04-04 DIAGNOSIS — Z79.4 TYPE 2 DIABETES MELLITUS WITH HYPERGLYCEMIA, WITH LONG-TERM CURRENT USE OF INSULIN (HCC): Primary | ICD-10-CM

## 2025-04-04 DIAGNOSIS — E11.65 TYPE 2 DIABETES MELLITUS WITH HYPERGLYCEMIA, WITH LONG-TERM CURRENT USE OF INSULIN (HCC): Primary | ICD-10-CM

## 2025-04-04 DIAGNOSIS — E11.22 TYPE 2 DIABETES MELLITUS WITH STAGE 3A CHRONIC KIDNEY DISEASE, WITH LONG-TERM CURRENT USE OF INSULIN (HCC): ICD-10-CM

## 2025-04-04 DIAGNOSIS — N18.31 STAGE 3A CHRONIC KIDNEY DISEASE (HCC): ICD-10-CM

## 2025-04-04 DIAGNOSIS — N18.31 TYPE 2 DIABETES MELLITUS WITH STAGE 3A CHRONIC KIDNEY DISEASE, WITH LONG-TERM CURRENT USE OF INSULIN (HCC): ICD-10-CM

## 2025-04-04 DIAGNOSIS — Z79.4 TYPE 2 DIABETES MELLITUS WITH STAGE 3A CHRONIC KIDNEY DISEASE, WITH LONG-TERM CURRENT USE OF INSULIN (HCC): ICD-10-CM

## 2025-04-04 DIAGNOSIS — E66.01 CLASS 2 SEVERE OBESITY WITH SERIOUS COMORBIDITY AND BODY MASS INDEX (BMI) OF 37.0 TO 37.9 IN ADULT, UNSPECIFIED OBESITY TYPE (HCC): ICD-10-CM

## 2025-04-04 DIAGNOSIS — I10 PRIMARY HYPERTENSION: ICD-10-CM

## 2025-04-04 DIAGNOSIS — E66.812 CLASS 2 SEVERE OBESITY WITH SERIOUS COMORBIDITY AND BODY MASS INDEX (BMI) OF 37.0 TO 37.9 IN ADULT, UNSPECIFIED OBESITY TYPE (HCC): ICD-10-CM

## 2025-04-04 PROCEDURE — G2211 COMPLEX E/M VISIT ADD ON: HCPCS | Performed by: INTERNAL MEDICINE

## 2025-04-04 PROCEDURE — 99214 OFFICE O/P EST MOD 30 MIN: CPT | Performed by: INTERNAL MEDICINE

## 2025-04-04 PROCEDURE — 95251 CONT GLUC MNTR ANALYSIS I&R: CPT | Performed by: INTERNAL MEDICINE

## 2025-04-04 RX ORDER — INSULIN LISPRO 100 U/ML
INJECTION, SOLUTION SUBCUTANEOUS
Qty: 45 ML | Refills: 2 | Status: SHIPPED | OUTPATIENT
Start: 2025-04-04

## 2025-04-04 RX ORDER — INSULIN GLARGINE 100 [IU]/ML
INJECTION, SOLUTION SUBCUTANEOUS
Qty: 45 ML | Refills: 2 | Status: SHIPPED | OUTPATIENT
Start: 2025-04-04 | End: 2025-04-04

## 2025-04-04 RX ORDER — INSULIN GLARGINE 100 [IU]/ML
INJECTION, SOLUTION SUBCUTANEOUS
Qty: 45 ML | Refills: 2 | Status: SHIPPED | OUTPATIENT
Start: 2025-04-04

## 2025-04-04 NOTE — PATIENT INSTRUCTIONS
Continue lantus 40 units in the morning, decrease to 28 units in the evening   Continue admelog 18 units with meals 2-3 times a day, if you know you are eating a higher carbohydrate meal, it is okay to take 2 to 3 units extra  Similarly if you are eating less carbs it is okay to take less insulin if you are not eating any carbohydrates, please skip mealtime insulin    In addition, please use humalog insulin per the following sliding scale to correct high blood sugars:  BG  151-200: 1 unit  201-250: 2 units  251-300: 3 units  301-350: 4 units  > 350: 5 units  Do not correct bed time highs unless > 200 mg/dl

## 2025-04-04 NOTE — PROGRESS NOTES
Donovan Burnette 75 y.o. male MRN: [id number]    Encounter: [id number]      Assessment & Plan  1. Diabetes Mellitus type II on long-term insulin therapy with hyperglycemia  2.  Obesity, BMI 36  3. CKD  Estimated A1c 7.6 based on recent blood glucose levels, improved from 9.3 in January.     Continue lantus 40 units in the morning, decrease to 28 units in the evening   Continue admelog 18 units with meals 2-3 times a day   Advised to adjust insulin dosage based on carb intake. Increase insulin by 2-3 units if consuming more carbs, reduce if fewer carbs, omit mealtime insulin if no carbs.   Use sliding scale for additional insulin if blood glucose remains high 2-3 hours post-meal, avoid excessive insulin to prevent hypoglycemia. 4  Renewed prescriptions for Lantus and Admelog sent to [facility].  Follow up with ophthalmology     4. Hypertension.  BP readings normal today. On valsartan and HCTZ.    5. Hyperlipidemia.  On atorvastatin 40 mg for cholesterol management.    6. Low Testosterone.  Calculated bioavailable and free testosterone within normal limits for age             CC: Diabetes    History of Present Illness  Donovan Burnette is a 75-year-old male with hypertension, obesity, CKD, and low testosterone here for follow-up. Last seen in 01/2025.    On metformin 1000 mg twice daily, Lantus 40 units AM, 30 units PM, short-acting insulin 18 units before meals. States his cardiologist advised additional 5 units insulin if blood sugar remains high after 3 hours.    Reports persistent nasal congestion and sinusitis for several weeks. Completed azithromycin course.    Diet inconsistent, typically two meals/day. Wakes between 5:30-7:00 AM, consumes coffee with sucralose, sometimes falls back asleep. First meal around lunchtime, diet includes diabetic-friendly pancakes, sausages, eggs, nguyen, coffee, Coke Zero. Abstained from beer and wine recently but may occasionally indulge. Lunch yesterday: sandwich with 26 g carbs per  slice, administered 18 units insulin. Dinner: pork fried rice, two chicken wings at 7:00 PM, no additional insulin before bedtime, usual dose 30 units. Considering adjusting long-acting insulin administration time. Despite reduced carbs and increased protein, no improvement in blood sugar levels.   No chest pain, SOB, diarrhea, constipation, or urinary complaints. Mild diarrhea post-COVID-19      Upcoming orthopedist appointment for twisted ankle next Wednesday.    No changes in vision, plans ophthalmologist appointment.    Podiatrist confirmed feet in good condition with adequate blood flow, no cuts or breaks in skin.    Cardiologist performed echocardiogram, informed athletic heart.. Cardiologist reduced HCTZ dosage due to occasional low BP.     Continues atorvastatin 40 mg for cholesterol management and valsartan. Takes vitamins A, D twice daily, and B12 as recommended.    SOCIAL HISTORY  Occasional beer or wine.        All other systems were reviewed and are negative.    Review of Systems      Historical Information   Past Medical History:   Diagnosis Date    Arthritis     Diabetes mellitus (HCC)     Flu     Hypertension     Kidney stone     Osteoarthritis      Past Surgical History:   Procedure Laterality Date    CHOLECYSTECTOMY      HERNIA REPAIR      had it done twice.    SHOULDER SURGERY Bilateral     TONSILLECTOMY       Social History   Social History     Substance and Sexual Activity   Alcohol Use Yes    Alcohol/week: 2.0 standard drinks of alcohol    Types: 1 Glasses of wine, 1 Cans of beer per week    Comment: occasional     Social History     Substance and Sexual Activity   Drug Use Never     Social History     Tobacco Use   Smoking Status Former    Current packs/day: 0.00    Average packs/day: 1 pack/day for 15.0 years (15.0 ttl pk-yrs)    Types: Cigarettes    Start date: 1970    Quit date: 1985    Years since quittin.2   Smokeless Tobacco Never     Family History:   Family History    Problem Relation Age of Onset    Cancer Mother     No Known Problems Sister     Diabetes type II Brother     Other (amputation) Brother     Diabetes type II Maternal Aunt     No Known Problems Son     No Known Problems Son     No Known Problems Son     No Known Problems Daughter     No Known Problems Daughter        Meds/Allergies   Current Outpatient Medications   Medication Sig Dispense Refill    Admelog SoloStar 100 units/mL injection pen Inject 10 Units under the skin 3 (three) times a day with meals (Patient taking differently: Inject 10 Units under the skin 3 (three) times a day with meals 14 units before every meal -if it does go down in 2 hours add 2-5 more units) 30 mL 1    atorvastatin (LIPITOR) 80 mg tablet Take 40 mg by mouth daily      BD Pen Needle Bernice 2nd Gen 32G X 4 MM MISC use 1 PEN NEEDLE to inject MEDICATION subcutaneously four times a day      cholecalciferol (VITAMIN D3) 400 units tablet Take 400 Units by mouth daily      Continuous Glucose Sensor (Dexcom G7 Sensor) Use 1 Device every 10 days 9 each 3    gabapentin (NEURONTIN) 300 mg capsule Take 600 mg by mouth 2 (two) times a day      hydrochlorothiazide (HYDRODIURIL) 25 mg tablet Take 25 mg by mouth daily      ketoconazole (NIZORAL) 2 % cream Apply topically daily 60 g 6    Lantus SoloStar 100 units/mL SOPN Inject Lantus 60 units subcutaneously twice a day (Patient taking differently: Inject Lantus 60 units subcutaneously twice a day  Patient is taking 40 units in the morning and 30 units at night) 45 mL 1    metFORMIN (GLUCOPHAGE) 1000 MG tablet Take 1,000 mg by mouth 2 (two) times a day with meals      traMADol (ULTRAM) 50 mg tablet Take 1 tablet (50 mg total) by mouth daily as needed for moderate pain 30 tablet 4    valsartan (DIOVAN) 320 MG tablet Take 320 mg by mouth daily      zolpidem (AMBIEN CR) 12.5 MG CR tablet Take 1 tablet (12.5 mg total) by mouth daily at bedtime as needed for sleep 30 tablet 4    azithromycin (ZITHROMAX) 250  "mg tablet Take 2 tablets today then 1 tablet daily x 4 days (Patient not taking: Reported on 4/4/2025) 6 tablet 0    rOPINIRole (REQUIP) 0.25 mg tablet take 1 tablet by mouth at bedtime (Patient not taking: Reported on 3/11/2025) 30 tablet 5     No current facility-administered medications for this visit.     No Known Allergies    Objective   Vitals: Blood pressure 120/61, pulse 71, height 6' 1\" (1.854 m), weight 126 kg (277 lb 9.6 oz), SpO2 97%.    Physical Exam  Constitutional:       General: He is not in acute distress.     Appearance: He is well-developed. He is not diaphoretic.   HENT:      Head: Normocephalic and atraumatic.   Eyes:      Conjunctiva/sclera: Conjunctivae normal.   Cardiovascular:      Rate and Rhythm: Normal rate and regular rhythm.      Heart sounds: Normal heart sounds. No murmur heard.  Pulmonary:      Effort: Pulmonary effort is normal. No respiratory distress.      Breath sounds: Normal breath sounds. No wheezing.   Abdominal:      General: There is no distension.      Palpations: Abdomen is soft.      Tenderness: There is no abdominal tenderness. There is no guarding.   Musculoskeletal:      Cervical back: Normal range of motion and neck supple.      Right lower leg: No edema.      Left lower leg: Edema present.   Skin:     General: Skin is warm and dry.      Findings: No erythema or rash.   Neurological:      Mental Status: He is alert and oriented to person, place, and time.   Psychiatric:         Behavior: Behavior normal.         Thought Content: Thought content normal.       The history was obtained from the review of the chart, patient.    Lab Results:   Lab Results   Component Value Date/Time    Hemoglobin A1C 9.3 (H) 01/28/2025 09:32 AM    Hemoglobin A1C 9.4 (A) 01/08/2025 09:27 AM    Hemoglobin A1C 8.9 (A) 10/07/2024 11:55 AM    Hemoglobin A1C 8.3 (H) 05/16/2024 12:08 PM    WBC 9.37 02/12/2025 10:01 AM    WBC 9.91 01/28/2025 09:32 AM    WBC 10.26 (H) 12/18/2024 10:58 AM    " "Hemoglobin 12.8 02/12/2025 10:01 AM    Hemoglobin 12.7 01/28/2025 09:32 AM    Hemoglobin 12.5 12/18/2024 10:58 AM    Hematocrit 39.2 02/12/2025 10:01 AM    Hematocrit 38.8 01/28/2025 09:32 AM    Hematocrit 39.1 12/18/2024 10:58 AM    MCV 89 02/12/2025 10:01 AM    MCV 87 01/28/2025 09:32 AM    MCV 89 12/18/2024 10:58 AM    Platelets 241 02/12/2025 10:01 AM    Platelets 236 01/28/2025 09:32 AM    Platelets 251 12/18/2024 10:58 AM    BUN 35 (H) 02/12/2025 10:01 AM    BUN 31 (H) 01/28/2025 09:32 AM    BUN 27 (H) 12/18/2024 10:58 AM    Potassium 4.5 02/12/2025 10:01 AM    Potassium 4.9 01/28/2025 09:32 AM    Potassium 4.7 12/18/2024 10:58 AM    Chloride 101 02/12/2025 10:01 AM    Chloride 103 01/28/2025 09:32 AM    Chloride 105 12/18/2024 10:58 AM    CO2 26 02/12/2025 10:01 AM    CO2 24 01/28/2025 09:32 AM    CO2 26 12/18/2024 10:58 AM    Creatinine 1.83 (H) 02/12/2025 10:01 AM    Creatinine 1.16 01/28/2025 09:32 AM    Creatinine 1.03 12/18/2024 10:58 AM    AST 30 02/12/2025 10:01 AM    AST 27 01/28/2025 09:32 AM    AST 29 12/18/2024 10:58 AM    ALT 23 02/12/2025 10:01 AM    ALT 24 01/28/2025 09:32 AM    ALT 25 12/18/2024 10:58 AM    Total Protein 7.1 02/12/2025 10:01 AM    Total Protein 7.1 01/28/2025 09:32 AM    Total Protein 7.1 12/18/2024 10:58 AM    Albumin 4.1 02/12/2025 10:01 AM    Albumin 4.0 01/28/2025 09:32 AM    Albumin 4.1 12/18/2024 10:58 AM    HDL, Direct 34 (L) 01/28/2025 09:32 AM    HDL, Direct 37 (L) 05/16/2024 12:08 PM    Triglycerides 136 01/28/2025 09:32 AM    Triglycerides 123 05/16/2024 12:08 PM         Imaging Studies: Results Review Statement: No pertinent imaging studies reviewed.    Portions of the record may have been created with voice recognition software. Occasional wrong word or \"sound a like\" substitutions may have occurred due to the inherent limitations of voice recognition software. Read the chart carefully and recognize, using context, where substitutions have occurred.    "

## 2025-04-09 ENCOUNTER — OFFICE VISIT (OUTPATIENT)
Dept: OBGYN CLINIC | Facility: CLINIC | Age: 75
End: 2025-04-09
Payer: MEDICARE

## 2025-04-09 VITALS — WEIGHT: 277 LBS | HEIGHT: 73 IN | BODY MASS INDEX: 36.71 KG/M2

## 2025-04-09 DIAGNOSIS — M21.42 PES PLANUS OF LEFT FOOT: Primary | ICD-10-CM

## 2025-04-09 DIAGNOSIS — M72.2 PLANTAR FASCIITIS OF LEFT FOOT: ICD-10-CM

## 2025-04-09 DIAGNOSIS — M76.822 INSUFFICIENCY OF LEFT POSTERIOR TIBIAL TENDON: ICD-10-CM

## 2025-04-09 PROCEDURE — 99203 OFFICE O/P NEW LOW 30 MIN: CPT | Performed by: ORTHOPAEDIC SURGERY

## 2025-04-09 RX ORDER — NAPROXEN 500 MG/1
500 TABLET ORAL 2 TIMES DAILY WITH MEALS
Qty: 30 TABLET | Refills: 0 | Status: SHIPPED | OUTPATIENT
Start: 2025-04-09 | End: 2025-04-16

## 2025-04-09 NOTE — PROGRESS NOTES
Assessment/Plan:  1. Pes planus of left foot  Ambulatory referral to Physical Therapy      2. Insufficiency of left posterior tibial tendon  Ambulatory Referral to Orthopedic Surgery    Ambulatory referral to Physical Therapy    naproxen (NAPROSYN) 500 mg tablet      3. Plantar fasciitis of left foot  Ambulatory Referral to Orthopedic Surgery    Ambulatory referral to Physical Therapy          Aamir has left-sided medial ankle pain consistent with posterior tibialis tendinitis and pes planus deformity.  He also has some plantar fasciitis present on exam.  We had a lengthy discussion about treatment mechanisms including proper footwear, arch support, physical therapy, ice, anti-inflammatories and orthotic inserts in the future if necessary.  He will undergo conservative measures and physical therapy at this time.  Follow-up as needed.    Subjective:   Donovan Burnette is a 75 y.o. male who presents to the office for evaluation for acute onset of left-sided foot pain and swelling.  He denies any injury or trauma.  He feels increased discomfort over the medial aspect of the ankle for the last few weeks.  He did present to his PCP office 2 weeks ago and had an ankle x-ray showing bone spurring and arthritic changes but no acute abnormality.  He feels pain when he walks and feels like the inside of his ankle is swollen.  He does have a history of pes planus deformity as well as a history of diabetic neuropathy.      Review of Systems   Constitutional:  Negative for chills, fever and unexpected weight change.   HENT:  Negative for hearing loss, nosebleeds and sore throat.    Eyes:  Negative for pain, redness and visual disturbance.   Respiratory:  Negative for cough, shortness of breath and wheezing.    Cardiovascular:  Negative for chest pain, palpitations and leg swelling.   Gastrointestinal:  Negative for abdominal pain, nausea and vomiting.   Endocrine: Negative for polyphagia and polyuria.   Genitourinary:  Negative for  dysuria and hematuria.   Musculoskeletal:         See HPI   Skin:  Negative for rash and wound.   Neurological:  Negative for dizziness, numbness and headaches.   Psychiatric/Behavioral:  Negative for decreased concentration and suicidal ideas. The patient is not nervous/anxious.          Past Medical History:   Diagnosis Date    Arthritis     Diabetes mellitus (HCC)     Flu     Hypertension     Kidney stone     Osteoarthritis        Past Surgical History:   Procedure Laterality Date    CHOLECYSTECTOMY      HERNIA REPAIR      had it done twice.    SHOULDER SURGERY Bilateral     TONSILLECTOMY         Family History   Problem Relation Age of Onset    Cancer Mother     No Known Problems Sister     Diabetes type II Brother     Other (amputation) Brother     Diabetes type II Maternal Aunt     No Known Problems Son     No Known Problems Son     No Known Problems Son     No Known Problems Daughter     No Known Problems Daughter        Social History     Occupational History    Not on file   Tobacco Use    Smoking status: Former     Current packs/day: 0.00     Average packs/day: 1 pack/day for 15.0 years (15.0 ttl pk-yrs)     Types: Cigarettes     Start date: 1970     Quit date: 1985     Years since quittin.2    Smokeless tobacco: Never   Vaping Use    Vaping status: Never Used   Substance and Sexual Activity    Alcohol use: Yes     Alcohol/week: 2.0 standard drinks of alcohol     Types: 1 Glasses of wine, 1 Cans of beer per week     Comment: occasional    Drug use: Never    Sexual activity: Not Currently     Partners: Female     Birth control/protection: None         Current Outpatient Medications:     Admelog SoloStar 100 units/mL injection pen, Inject 18-20 units with meals there times a day and per sliding scale insulin. Maximum 70 units per day, Disp: 45 mL, Rfl: 2    atorvastatin (LIPITOR) 80 mg tablet, Take 40 mg by mouth daily, Disp: , Rfl:     BD Pen Needle Bernice 2nd Gen 32G X 4 MM MISC, use 1 PEN  NEEDLE to inject MEDICATION subcutaneously four times a day, Disp: , Rfl:     cholecalciferol (VITAMIN D3) 400 units tablet, Take 400 Units by mouth daily, Disp: , Rfl:     Continuous Glucose Sensor (Dexcom G7 Sensor), Use 1 Device every 10 days, Disp: 9 each, Rfl: 3    gabapentin (NEURONTIN) 300 mg capsule, Take 600 mg by mouth 2 (two) times a day, Disp: , Rfl:     hydrochlorothiazide (HYDRODIURIL) 25 mg tablet, Take 25 mg by mouth daily, Disp: , Rfl:     ketoconazole (NIZORAL) 2 % cream, Apply topically daily, Disp: 60 g, Rfl: 6    Lantus SoloStar 100 units/mL SOPN, Inject Lantus 40 units subcutaneously in the morning, 28 units int he evening, Disp: 45 mL, Rfl: 2    metFORMIN (GLUCOPHAGE) 1000 MG tablet, Take 1,000 mg by mouth 2 (two) times a day with meals, Disp: , Rfl:     rOPINIRole (REQUIP) 0.25 mg tablet, take 1 tablet by mouth at bedtime (Patient not taking: Reported on 3/11/2025), Disp: 30 tablet, Rfl: 5    traMADol (ULTRAM) 50 mg tablet, Take 1 tablet (50 mg total) by mouth daily as needed for moderate pain, Disp: 30 tablet, Rfl: 4    valsartan (DIOVAN) 320 MG tablet, Take 320 mg by mouth daily, Disp: , Rfl:     zolpidem (AMBIEN CR) 12.5 MG CR tablet, Take 1 tablet (12.5 mg total) by mouth daily at bedtime as needed for sleep, Disp: 30 tablet, Rfl: 4    No Known Allergies    Objective:  There were no vitals filed for this visit.         Left Ankle Exam     Tenderness   Left ankle tenderness location: Tenderness palpation over tibial tendon just posterior to medial malleolus concerning over the navicular bone.   Swelling: mild    Range of Motion   Dorsiflexion:  normal   Plantar flexion:  normal   Eversion:  normal   Inversion:  normal     Muscle Strength   Dorsiflexion:  5/5   Plantar flexion:  5/5   Anterior tibial:  5/5   Posterior tibial:  4/5  Gastrocsoleus:  5/5  Peroneal muscle:  5/5    Other   Erythema: absent  Sensation: normal  Pulse: present    Comments:  Pes planus  deformity          Strength/Myotome Testing     Left Ankle/Foot   Dorsiflexion: 5  Plantar flexion: 5      Physical Exam  Vitals reviewed.   Constitutional:       Appearance: He is well-developed.   HENT:      Head: Normocephalic and atraumatic.   Eyes:      Conjunctiva/sclera: Conjunctivae normal.      Pupils: Pupils are equal, round, and reactive to light.   Cardiovascular:      Rate and Rhythm: Normal rate.      Pulses: Normal pulses.   Pulmonary:      Effort: Pulmonary effort is normal. No respiratory distress.   Musculoskeletal:      Cervical back: Normal range of motion and neck supple.      Comments: As noted in HPI   Skin:     General: Skin is warm and dry.   Neurological:      General: No focal deficit present.      Mental Status: He is alert and oriented to person, place, and time.   Psychiatric:         Mood and Affect: Mood normal.         Behavior: Behavior normal.         I have personally reviewed pertinent films in PACS and my interpretation is as follows:  X-rays of the left ankle demonstrate bone spurring over the calcaneus at the Achilles and plantar fascia insertion.  Mild degenerative changes over the medial malleolus.  No acute fracture.      This document was created using speech voice recognition software.   Grammatical errors, random word insertions, pronoun errors, and incomplete sentences are an occasional consequence of this system due to software limitations, ambient noise, and hardware issues.   Any formal questions or concerns about content, text, or information contained within the body of this dictation should be directly addressed to the provider for clarification.

## 2025-04-16 DIAGNOSIS — M76.822 INSUFFICIENCY OF LEFT POSTERIOR TIBIAL TENDON: ICD-10-CM

## 2025-04-16 RX ORDER — NAPROXEN 500 MG/1
500 TABLET ORAL 2 TIMES DAILY WITH MEALS
Qty: 30 TABLET | Refills: 0 | Status: SHIPPED | OUTPATIENT
Start: 2025-04-16

## 2025-05-20 ENCOUNTER — PROCEDURE VISIT (OUTPATIENT)
Age: 75
End: 2025-05-20
Payer: MEDICARE

## 2025-05-20 VITALS — RESPIRATION RATE: 17 BRPM | BODY MASS INDEX: 36.71 KG/M2 | WEIGHT: 277 LBS | HEIGHT: 73 IN

## 2025-05-20 DIAGNOSIS — M77.41 METATARSALGIA OF BOTH FEET: Primary | ICD-10-CM

## 2025-05-20 DIAGNOSIS — M79.671 PAIN IN BOTH FEET: ICD-10-CM

## 2025-05-20 DIAGNOSIS — M21.962 ACQUIRED DEFORMITY OF BOTH FEET: ICD-10-CM

## 2025-05-20 DIAGNOSIS — M21.961 ACQUIRED DEFORMITY OF BOTH FEET: ICD-10-CM

## 2025-05-20 DIAGNOSIS — B35.9 DERMATOPHYTOSIS: ICD-10-CM

## 2025-05-20 DIAGNOSIS — M79.672 PAIN IN BOTH FEET: ICD-10-CM

## 2025-05-20 DIAGNOSIS — E11.42 DIABETIC POLYNEUROPATHY ASSOCIATED WITH TYPE 2 DIABETES MELLITUS (HCC): ICD-10-CM

## 2025-05-20 DIAGNOSIS — M77.42 METATARSALGIA OF BOTH FEET: Primary | ICD-10-CM

## 2025-05-20 PROCEDURE — 99213 OFFICE O/P EST LOW 20 MIN: CPT | Performed by: PODIATRIST

## 2025-05-20 NOTE — PROGRESS NOTES
Assessment/Plan: Metatarsalgia secondary to diabetic neuropathy.  Rule out radiculopathy.  Mycosis of nail and skin.  Acquired deformity of foot.  Pain upon ambulation.     Plan.  Chart reviewed.  PCP notes reviewed.  Diabetic foot exam performed.  Patient educated on care of the diabetic foot.  Patient and family advised on nail cutting instruction.  At this time we recommend addition of topical antifungal.  Ketoconazole ordered.  Patient to remain on gabapentin.  He will consider alpha lipoic acid.  In addition patient family will spray shoes weekly with Lysol.  Watch for signs of infection or ulceration.  Aftercare instruction given.  Return for follow-up.    In addition patient would benefit from offloading inlays.  Diabetic shoes and inlays have been ordered.           Assessment  Diagnoses and all orders for this visit:     Metatarsalgia of both feet     Type 2 diabetes mellitus with stage 3a chronic kidney disease, with long-term current use of insulin (HCC)       Diabetic polyneuropathy associated with type 2 diabetes mellitus (HCC)     Dermatophytosis  -     ketoconazole (NIZORAL) 2 % cream; Apply topically daily     Pain in both feet     Onychomycosis  -     ketoconazole (NIZORAL) 2 % cream; Apply topically daily              Subjective: Patient is seen on referral from primary care.  Patient is diabetic.  He has no neuropathy.  He is taking gabapentin.  He still gets some numbness in his feet.                 No Known Allergies       Current Medications      Current Outpatient Medications:     Admelog SoloStar 100 units/mL injection pen, Inject 10 Units under the skin 3 (three) times a day with meals (Patient taking differently: Inject 10 Units under the skin 3 (three) times a day with meals 14 units before every meal -if it does go down in 2 hours add 2-5 more units), Disp: 30 mL, Rfl: 1    atorvastatin (LIPITOR) 80 mg tablet, Take 40 mg by mouth daily, Disp: , Rfl:     BD Pen Needle Bernice 2nd Gen 32G X 4  MM MISC, use 1 PEN NEEDLE to inject MEDICATION subcutaneously four times a day, Disp: , Rfl:     cholecalciferol (VITAMIN D3) 400 units tablet, Take 400 Units by mouth daily, Disp: , Rfl:     Continuous Glucose Sensor (Dexcom G7 Sensor), Use 1 Device every 10 days, Disp: 9 each, Rfl: 3    gabapentin (NEURONTIN) 300 mg capsule, Take 600 mg by mouth 2 (two) times a day, Disp: , Rfl:     hydrochlorothiazide (HYDRODIURIL) 25 mg tablet, Take 25 mg by mouth daily, Disp: , Rfl:     ketoconazole (NIZORAL) 2 % cream, Apply topically daily, Disp: 60 g, Rfl: 6    Lantus SoloStar 100 units/mL SOPN, Inject Lantus 60 units subcutaneously twice a day, Disp: 45 mL, Rfl: 1    metFORMIN (GLUCOPHAGE) 1000 MG tablet, Take 1,000 mg by mouth 2 (two) times a day with meals, Disp: , Rfl:     traMADol (ULTRAM) 50 mg tablet, Take 1 tablet (50 mg total) by mouth daily as needed for moderate pain, Disp: 30 tablet, Rfl: 4    valsartan (DIOVAN) 320 MG tablet, Take 320 mg by mouth daily, Disp: , Rfl:     zolpidem (AMBIEN CR) 12.5 MG CR tablet, Take 1 tablet (12.5 mg total) by mouth daily at bedtime as needed for sleep, Disp: 30 tablet, Rfl: 4    rOPINIRole (REQUIP) 0.25 mg tablet, take 1 tablet by mouth at bedtime (Patient not taking: Reported on 3/11/2025), Disp: 30 tablet, Rfl: 5        Problem List       Patient Active Problem List   Diagnosis    Syncope    Primary hypertension    Obesity, morbid (HCC)    Platelets decreased (HCC)    Stage 3a chronic kidney disease (HCC)    Type 2 diabetes mellitus with stage 3a chronic kidney disease, with long-term current use of insulin (HCC)    Type 2 diabetes mellitus with hyperglycemia, with long-term current use of insulin (HCC)    Low testosterone    Class 2 severe obesity with serious comorbidity and body mass index (BMI) of 37.0 to 37.9 in adult (HCC)               Subjective  Patient ID: Donovan Burnette is a 75 y.o. male.     HPI     The following portions of the patient's history were reviewed and  updated as appropriate: He  has a past medical history of Arthritis, Diabetes mellitus ([facility]), Flu (2023), Hypertension, and Kidney stone.  He        Patient Active Problem List     Diagnosis Date Noted    Type 2 diabetes mellitus with hyperglycemia, with long-term current use of insulin ([facility]) 01/14/2025    Low testosterone 01/14/2025    Class 2 severe obesity with serious comorbidity and body mass index (BMI) of 37.0 to 37.9 in adult ([facility]) 01/14/2025    Type 2 diabetes mellitus with stage 3a chronic kidney disease, with long-term current use of insulin ([facility]) 10/07/2024    Obesity, morbid ([facility]) 05/14/2024    Platelets decreased ([facility]ly [facility]) 05/14/2024    Stage 3a chronic kidney disease ([facility]) 05/14/2024    Primary hypertension 12/09/2023    Syncope 12/08/2023      He  has a past surgical history that includes Cholecystectomy; Hernia repair; Shoulder surgery (Bilateral); and Tonsillectomy.  His family history includes Cancer in his mother; Diabetes type II in his brother and maternal aunt; No Known Problems in his daughter, daughter, sister, son, son, and son; amputation in his brother.  He  reports that he quit smoking about 43 years ago. His smoking use included cigarettes. He started smoking about 55 years ago. He has a 12 pack-year smoking history. He has never used smokeless tobacco. He reports current alcohol use of about 2.0 standard drinks of alcohol per week. He reports that he does not use drugs.  Current Rx          Current Outpatient Medications   Medication Sig Dispense Refill    Admelog SoloStar 100 units/mL injection pen Inject 10 Units under the skin 3 (three) times a day with meals (Patient taking differently: Inject 10 Units under the skin 3 (three) times a day with meals 14 units before every meal -if it does go down in 2 hours add 2-5 more units) 30 mL 1    atorvastatin (LIPITOR) 80 mg tablet Take 40 mg by mouth daily        BD Pen Needle Bernice 2nd Gen 32G X 4 MM MISC use 1 PEN NEEDLE to inject MEDICATION  subcutaneously four times a day        cholecalciferol (VITAMIN D3) 400 units tablet Take 400 Units by mouth daily        Continuous Glucose Sensor (Dexcom G7 Sensor) Use 1 Device every 10 days 9 each 3    gabapentin (NEURONTIN) 300 mg capsule Take 600 mg by mouth 2 (two) times a day        hydrochlorothiazide (HYDRODIURIL) 25 mg tablet Take 25 mg by mouth daily        ketoconazole (NIZORAL) 2 % cream Apply topically daily 60 g 6    Lantus SoloStar 100 units/mL SOPN Inject Lantus 60 units subcutaneously twice a day 45 mL 1    metFORMIN (GLUCOPHAGE) 1000 MG tablet Take 1,000 mg by mouth 2 (two) times a day with meals        traMADol (ULTRAM) 50 mg tablet Take 1 tablet (50 mg total) by mouth daily as needed for moderate pain 30 tablet 4    valsartan (DIOVAN) 320 MG tablet Take 320 mg by mouth daily        zolpidem (AMBIEN CR) 12.5 MG CR tablet Take 1 tablet (12.5 mg total) by mouth daily at bedtime as needed for sleep 30 tablet 4    rOPINIRole (REQUIP) 0.25 mg tablet take 1 tablet by mouth at bedtime (Patient not taking: Reported on 3/11/2025) 30 tablet 5      No current facility-administered medications for this visit.              Current Outpatient Medications on File Prior to Visit   Medication Sig    Admelog SoloStar 100 units/mL injection pen Inject 10 Units under the skin 3 (three) times a day with meals (Patient taking differently: Inject 10 Units under the skin 3 (three) times a day with meals 14 units before every meal -if it does go down in 2 hours add 2-5 more units)    atorvastatin (LIPITOR) 80 mg tablet Take 40 mg by mouth daily    BD Pen Needle Bernice 2nd Gen 32G X 4 MM MISC use 1 PEN NEEDLE to inject MEDICATION subcutaneously four times a day    cholecalciferol (VITAMIN D3) 400 units tablet Take 400 Units by mouth daily    Continuous Glucose Sensor (Dexcom G7 Sensor) Use 1 Device every 10 days    gabapentin (NEURONTIN) 300 mg capsule Take 600 mg by mouth 2 (two) times a day    hydrochlorothiazide  (HYDRODIURIL) 25 mg tablet Take 25 mg by mouth daily    Lantus SoloStar 100 units/mL SOPN Inject Lantus 60 units subcutaneously twice a day    metFORMIN (GLUCOPHAGE) 1000 MG tablet Take 1,000 mg by mouth 2 (two) times a day with meals    traMADol (ULTRAM) 50 mg tablet Take 1 tablet (50 mg total) by mouth daily as needed for moderate pain    valsartan (DIOVAN) 320 MG tablet Take 320 mg by mouth daily    zolpidem (AMBIEN CR) 12.5 MG CR tablet Take 1 tablet (12.5 mg total) by mouth daily at bedtime as needed for sleep    rOPINIRole (REQUIP) 0.25 mg tablet take 1 tablet by mouth at bedtime (Patient not taking: Reported on 3/11/2025)      No current facility-administered medications on file prior to visit.      He has no known allergies..      Objective:  Patient's shoes and socks removed.    Foot Exam     General  General Appearance: appears stated age and healthy   Orientation: alert and oriented to person, place, and time   Affect: appropriate   Gait: antalgic   Assistance: cane use         Right Foot/Ankle      Inspection and Palpation  Tenderness: metatarsals   Swelling: dorsum   Arch: pes planus  Skin Exam: dry skin and tinea;      Neurovascular  Dorsalis pedis: 3+  Posterior tibial: 2+  Saphenous nerve sensation: absent  Tibial nerve sensation: absent  Superficial peroneal nerve sensation: absent  Deep peroneal nerve sensation: absent  Sural nerve sensation: absent        Left Foot/Ankle       Inspection and Palpation  Tenderness: metatarsals   Swelling: dorsum   Arch: pes planus  Hammertoes: fifth toe  Skin Exam: dry skin and tinea;      Neurovascular  Dorsalis pedis: 3+  Posterior tibial: 2+  Saphenous nerve sensation: absent  Tibial nerve sensation: absent  Superficial peroneal nerve sensation: absent  Deep peroneal nerve sensation: absent  Sural nerve sensation: absent        Physical Exam  Vitals and nursing note reviewed.   Constitutional:       Appearance: Normal appearance.   Cardiovascular:      Rate and  Rhythm: Normal rate and regular rhythm.      Pulses: no weak pulses.           Dorsalis pedis pulses are 3+ on the right side and 3+ on the left side.        Posterior tibial pulses are 2+ on the right side and 2+ on the left side.   Feet:      Right foot:      Skin integrity: Dry skin present.      Left foot:      Skin integrity: Dry skin present.   Skin:     Capillary Refill: Capillary refill takes less than 2 seconds.      Comments: Patient has severe xerosis of skin.  This is consistent with dermatophytosis.  May need to rule out atypical variant psoriasis.     All nails are dystrophic.  They demonstrate distal mycosis.  Patient has preulcerative callus IPJ hallux bilateral.      Neurological:      Mental Status: He is alert.   Psychiatric:         Mood and Affect: Mood normal.         Behavior: Behavior normal.         Thought Content: Thought content normal.         Judgment: Judgment normal.      Patient's shoes and socks removed.     Right Foot/Ankle   Right Foot Inspection  Skin Exam: dry skin and pre-ulcer.      Toe Exam: swelling and right toe deformity.      Sensory   Vibration: absent  Proprioception: diminished  Monofilament testing: diminished     Vascular  Capillary refills: < 3 seconds  The right DP pulse is 3+. The right PT pulse is 2+.      Right Toe  - Comprehensive Exam  Arch: pes planus  Swelling: dorsum   Tenderness: metatarsals         Left Foot/Ankle  Left Foot Inspection  Skin Exam: dry skin and pre-ulcer.      Toe Exam: swelling and left toe deformity.      Sensory   Vibration: absent  Proprioception: diminished  Monofilament testing: diminished     Vascular  Capillary refills: < 3 seconds  The left DP pulse is 3+. The left PT pulse is 2+.      Left Toe  - Comprehensive Exam  Arch: pes planus  Hammertoes: fifth toe  Swelling: dorsum   Tenderness: metatarsals         Assign Risk Category  Deformity present  Loss of protective sensation  No weak pulses  Risk: 2

## 2025-06-12 ENCOUNTER — DOCUMENTATION (OUTPATIENT)
Dept: ADMINISTRATIVE | Facility: OTHER | Age: 75
End: 2025-06-12

## 2025-06-12 NOTE — PROGRESS NOTES
06/12/25 2:25 PM     DM A1c outreach is not required, patient has an upcoming appointment with the Endocrinologist office.    Thank you.  Scout Bailey MA  [facility]

## 2025-06-17 DIAGNOSIS — E11.22 TYPE 2 DIABETES MELLITUS WITH STAGE 3A CHRONIC KIDNEY DISEASE, WITH LONG-TERM CURRENT USE OF INSULIN (HCC): ICD-10-CM

## 2025-06-17 DIAGNOSIS — Z79.4 TYPE 2 DIABETES MELLITUS WITH STAGE 3A CHRONIC KIDNEY DISEASE, WITH LONG-TERM CURRENT USE OF INSULIN (HCC): ICD-10-CM

## 2025-06-17 DIAGNOSIS — Z79.4 TYPE 2 DIABETES MELLITUS WITHOUT COMPLICATION, WITH LONG-TERM CURRENT USE OF INSULIN (HCC): ICD-10-CM

## 2025-06-17 DIAGNOSIS — E11.9 TYPE 2 DIABETES MELLITUS WITHOUT COMPLICATION, WITH LONG-TERM CURRENT USE OF INSULIN (HCC): ICD-10-CM

## 2025-06-17 DIAGNOSIS — G89.29 OTHER CHRONIC PAIN: ICD-10-CM

## 2025-06-17 DIAGNOSIS — N18.31 TYPE 2 DIABETES MELLITUS WITH STAGE 3A CHRONIC KIDNEY DISEASE, WITH LONG-TERM CURRENT USE OF INSULIN (HCC): ICD-10-CM

## 2025-06-17 RX ORDER — TRAMADOL HYDROCHLORIDE 50 MG/1
50 TABLET ORAL DAILY PRN
Qty: 30 TABLET | Refills: 0 | Status: SHIPPED | OUTPATIENT
Start: 2025-06-17

## 2025-06-17 RX ORDER — INSULIN LISPRO 100 U/ML
INJECTION, SOLUTION SUBCUTANEOUS
Qty: 75 ML | Refills: 1 | Status: SHIPPED | OUTPATIENT
Start: 2025-06-17

## 2025-06-17 NOTE — TELEPHONE ENCOUNTER
Reason for call:   [x] Refill   [] Prior Auth  [x] Other: New Pharmacy    Office:   [x] PCP/Provider - Carolynn Foster MD   [] Specialty/Provider -     Medication:  traMADol (ULTRAM) 50 mg tablet    Dose/Frequency: Take 1 tablet (50 mg total) by mouth daily as needed for moderate pain     Quantity: 30    Pharmacy: [facility]/pharmacy #[id number] - [place] [address] [address] [facility]   Does the patient have enough for 3 days?   [] Yes   [x] No - Send as HP to POD    Mail Away Pharmacy   Does the patient have enough for 10 days?   [] Yes   [] No - Send as HP to POD

## 2025-06-17 NOTE — TELEPHONE ENCOUNTER
Reason for call:   [x] Refill   [] Prior Auth  [] Other:     Office:   [] PCP/Provider -   [x] Specialty/Provider - Endo    Medication: Admelog SoloStar 100 units/mL injection pen     Dose/Frequency: : Inject 18-20 units with meals there times a day and per sliding scale insulin     Quantity: 45 ml    [facility] [place] [address] [facility]   Does the patient have enough for 3 days?   [] Yes   [] No - Send as HP to POD    Mail Away Pharmacy   Does the patient have enough for 10 days?   [] Yes   [] No - Send as HP to POD    Reason for call:   [] Refill   [] Prior Auth  [] Other:     Office:   [] PCP/Provider -   [] Specialty/Provider -     Medication:  BD Pen Needle Bernice 2nd Gen 32G X 4 MM MISC    Dose/Frequency:  use 1 PEN NEEDLE to inject MEDICATION subcutaneously four times a day     Quantity:     [facility] [place] [address] Pharmacy   Does the patient have enough for 3 days?   [] Yes   [] No - Send as HP to POD    Mail Away Pharmacy   Does the patient have enough for 10 days?   [] Yes   [] No - Send as HP to POD    Reason for call:   [] Refill   [] Prior Auth  [] Other:     Office:   [] PCP/Provider -   [] Specialty/Provider -     Medication: Lantus SoloStar 100 units/mL SOPN     Dose/Frequency:  Inject Lantus 40 units subcutaneously in the morning, 28 units int he evening,     Quantity: 45 ml    Pharmacy: [facility]/pharmacy [address] [place] [address] Pharmacy   Does the patient have enough for 3 days?   [] Yes   [x] No - Send as HP to POD    Mail Away Pharmacy   Does the patient have enough for 10 days?   [] Yes   [] No - Send as HP to POD    Reason for call:   [x] Refill   [] Prior Auth  [] Other:     Office:   [x] PCP/Provider -   [] Specialty/Provider -     Medication: metFORMIN (GLUCOPHAGE) 1000 MG tablet     Dose/Frequency: Take 1,000 mg by mouth 2 (two) times a day with meals,      Quantity:     Pharmacy: [facility]/pharmacy #[id number] [address] Pharmacy   Does the patient have enough for 3 days?   [] Yes   [] No - Send as HP to POD    Mail Away Pharmacy   Does the patient have enough for 10 days?   [] Yes   [] No - Send as HP to POD    Reason for call:   [] Refill   [] Prior Auth  [] Other:     Office:   [] PCP/Provider -   [] Specialty/Provider -     Medication:  Continuous Glucose Sensor (Dexcom G7 Sensor)    Dose/Frequency: : Use 1 Device every 10 days     Quantity:     Pharmacy: [facility]/pharmacy #[id number] [facility] [address] [facility]   Does the patient have enough for 3 days?   [] Yes   [] No - Send as HP to POD    Mail Away Pharmacy   Does the patient have enough for 10 days?   [] Yes   [] No - Send as HP to POD

## 2025-06-18 ENCOUNTER — TELEPHONE (OUTPATIENT)
Dept: FAMILY MEDICINE CLINIC | Facility: CLINIC | Age: 75
End: 2025-06-18

## 2025-06-18 RX ORDER — PEN NEEDLE, DIABETIC 32GX 5/32"
NEEDLE, DISPOSABLE MISCELLANEOUS
Qty: 450 EACH | Refills: 2 | Status: SHIPPED | OUTPATIENT
Start: 2025-06-18

## 2025-06-18 RX ORDER — ACYCLOVIR 400 MG/1
1 TABLET ORAL
Qty: 9 EACH | Refills: 0 | Status: SHIPPED | OUTPATIENT
Start: 2025-06-18

## 2025-06-18 NOTE — TELEPHONE ENCOUNTER
----- Message from Carolynn Foster MD sent at 6/17/2025 11:03 PM EDT -----  Due for AWV and DM. Blood work is already in computer

## 2025-07-02 ENCOUNTER — TELEPHONE (OUTPATIENT)
Age: 75
End: 2025-07-02

## 2025-07-02 DIAGNOSIS — R53.83 OTHER FATIGUE: ICD-10-CM

## 2025-07-02 DIAGNOSIS — H91.93 HEARING DECREASED, BILATERAL: Primary | ICD-10-CM

## 2025-07-02 DIAGNOSIS — M25.50 PAIN IN JOINT, MULTIPLE SITES: ICD-10-CM

## 2025-07-02 NOTE — TELEPHONE ENCOUNTER
Patient spouse called in stating that she tried to make patient an appointment with audiology and rheumatology today. She was advised that the referrals in the system are outdated and is asking for new ones to be placed. Please advise.

## 2025-07-02 NOTE — TELEPHONE ENCOUNTER
Patient's wife called to schedule a new patient appointment. I advised we need a referral to proceed in scheduling. Patient will contact PCP to have a referral sent over. Thank You

## 2025-07-07 ENCOUNTER — TELEPHONE (OUTPATIENT)
Age: 75
End: 2025-07-07

## 2025-07-10 ENCOUNTER — APPOINTMENT (OUTPATIENT)
Dept: LAB | Facility: CLINIC | Age: 75
End: 2025-07-10
Attending: INTERNAL MEDICINE
Payer: MEDICARE

## 2025-07-10 DIAGNOSIS — Z79.4 TYPE 2 DIABETES MELLITUS WITH HYPERGLYCEMIA, WITH LONG-TERM CURRENT USE OF INSULIN (HCC): ICD-10-CM

## 2025-07-10 DIAGNOSIS — I10 ESSENTIAL (PRIMARY) HYPERTENSION: ICD-10-CM

## 2025-07-10 DIAGNOSIS — R53.83 OTHER FATIGUE: ICD-10-CM

## 2025-07-10 DIAGNOSIS — E11.22 TYPE 2 DIABETES MELLITUS WITH STAGE 3A CHRONIC KIDNEY DISEASE, WITH LONG-TERM CURRENT USE OF INSULIN (HCC): ICD-10-CM

## 2025-07-10 DIAGNOSIS — R00.2 PALPITATIONS: ICD-10-CM

## 2025-07-10 DIAGNOSIS — E11.9 TYPE 2 DIABETES MELLITUS WITHOUT COMPLICATIONS (HCC): ICD-10-CM

## 2025-07-10 DIAGNOSIS — E07.9 DISORDER OF THYROID, UNSPECIFIED: ICD-10-CM

## 2025-07-10 DIAGNOSIS — R73.09 OTHER ABNORMAL GLUCOSE: ICD-10-CM

## 2025-07-10 DIAGNOSIS — E55.9 VITAMIN D DEFICIENCY, UNSPECIFIED: ICD-10-CM

## 2025-07-10 DIAGNOSIS — E78.5 HYPERLIPIDEMIA, UNSPECIFIED: ICD-10-CM

## 2025-07-10 DIAGNOSIS — E66.01 CLASS 2 SEVERE OBESITY WITH SERIOUS COMORBIDITY AND BODY MASS INDEX (BMI) OF 37.0 TO 37.9 IN ADULT, UNSPECIFIED OBESITY TYPE (HCC): ICD-10-CM

## 2025-07-10 DIAGNOSIS — Z79.4 TYPE 2 DIABETES MELLITUS WITH STAGE 3A CHRONIC KIDNEY DISEASE, WITH LONG-TERM CURRENT USE OF INSULIN (HCC): ICD-10-CM

## 2025-07-10 DIAGNOSIS — N18.31 TYPE 2 DIABETES MELLITUS WITH STAGE 3A CHRONIC KIDNEY DISEASE, WITH LONG-TERM CURRENT USE OF INSULIN (HCC): ICD-10-CM

## 2025-07-10 DIAGNOSIS — R74.8 ABNORMAL LEVELS OF OTHER SERUM ENZYMES: ICD-10-CM

## 2025-07-10 DIAGNOSIS — E66.812 CLASS 2 SEVERE OBESITY WITH SERIOUS COMORBIDITY AND BODY MASS INDEX (BMI) OF 37.0 TO 37.9 IN ADULT, UNSPECIFIED OBESITY TYPE (HCC): ICD-10-CM

## 2025-07-10 DIAGNOSIS — E11.65 TYPE 2 DIABETES MELLITUS WITH HYPERGLYCEMIA, WITH LONG-TERM CURRENT USE OF INSULIN (HCC): ICD-10-CM

## 2025-07-10 DIAGNOSIS — I10 PRIMARY HYPERTENSION: ICD-10-CM

## 2025-07-10 DIAGNOSIS — N18.31 STAGE 3A CHRONIC KIDNEY DISEASE (HCC): ICD-10-CM

## 2025-07-10 DIAGNOSIS — N40.0 BENIGN PROSTATIC HYPERPLASIA WITHOUT LOWER URINARY TRACT SYMPTOMS: ICD-10-CM

## 2025-07-10 DIAGNOSIS — J01.80 OTHER ACUTE SINUSITIS, RECURRENCE NOT SPECIFIED: ICD-10-CM

## 2025-07-10 LAB
25(OH)D3 SERPL-MCNC: 43.1 NG/ML (ref 30–100)
ALBUMIN SERPL BCG-MCNC: 4.1 G/DL (ref 3.5–5)
ALP SERPL-CCNC: 72 U/L (ref 34–104)
ALT SERPL W P-5'-P-CCNC: 18 U/L (ref 7–52)
ANION GAP SERPL CALCULATED.3IONS-SCNC: 6 MMOL/L (ref 4–13)
AST SERPL W P-5'-P-CCNC: 19 U/L (ref 13–39)
BASOPHILS # BLD AUTO: 0.06 THOUSANDS/ÂΜL (ref 0–0.1)
BASOPHILS NFR BLD AUTO: 1 % (ref 0–1)
BILIRUB SERPL-MCNC: 0.49 MG/DL (ref 0.2–1)
BUN SERPL-MCNC: 26 MG/DL (ref 5–25)
CALCIUM SERPL-MCNC: 9 MG/DL (ref 8.4–10.2)
CHLORIDE SERPL-SCNC: 105 MMOL/L (ref 96–108)
CHOLEST SERPL-MCNC: 102 MG/DL (ref ?–200)
CK SERPL-CCNC: 157 U/L (ref 39–308)
CO2 SERPL-SCNC: 27 MMOL/L (ref 21–32)
CREAT SERPL-MCNC: 1.21 MG/DL (ref 0.6–1.3)
EOSINOPHIL # BLD AUTO: 0.47 THOUSAND/ÂΜL (ref 0–0.61)
EOSINOPHIL NFR BLD AUTO: 5 % (ref 0–6)
ERYTHROCYTE [DISTWIDTH] IN BLOOD BY AUTOMATED COUNT: 12.9 % (ref 11.6–15.1)
GFR SERPL CREATININE-BSD FRML MDRD: 58 ML/MIN/1.73SQ M
GLUCOSE P FAST SERPL-MCNC: 168 MG/DL (ref 65–99)
HCT VFR BLD AUTO: 39.5 % (ref 36.5–49.3)
HDLC SERPL-MCNC: 37 MG/DL
HGB BLD-MCNC: 13 G/DL (ref 12–17)
IMM GRANULOCYTES # BLD AUTO: 0.02 THOUSAND/UL (ref 0–0.2)
IMM GRANULOCYTES NFR BLD AUTO: 0 % (ref 0–2)
LDLC SERPL CALC-MCNC: 47 MG/DL (ref 0–100)
LYMPHOCYTES # BLD AUTO: 2.25 THOUSANDS/ÂΜL (ref 0.6–4.47)
LYMPHOCYTES NFR BLD AUTO: 24 % (ref 14–44)
MCH RBC QN AUTO: 28.8 PG (ref 26.8–34.3)
MCHC RBC AUTO-ENTMCNC: 32.9 G/DL (ref 31.4–37.4)
MCV RBC AUTO: 88 FL (ref 82–98)
MONOCYTES # BLD AUTO: 0.66 THOUSAND/ÂΜL (ref 0.17–1.22)
MONOCYTES NFR BLD AUTO: 7 % (ref 4–12)
NEUTROPHILS # BLD AUTO: 5.94 THOUSANDS/ÂΜL (ref 1.85–7.62)
NEUTS SEG NFR BLD AUTO: 63 % (ref 43–75)
NONHDLC SERPL-MCNC: 65 MG/DL
NRBC BLD AUTO-RTO: 0 /100 WBCS
PLATELET # BLD AUTO: 212 THOUSANDS/UL (ref 149–390)
PMV BLD AUTO: 10 FL (ref 8.9–12.7)
POTASSIUM SERPL-SCNC: 4.3 MMOL/L (ref 3.5–5.3)
PROT SERPL-MCNC: 7 G/DL (ref 6.4–8.4)
PSA SERPL-MCNC: 4.38 NG/ML (ref 0–4)
RBC # BLD AUTO: 4.51 MILLION/UL (ref 3.88–5.62)
SODIUM SERPL-SCNC: 138 MMOL/L (ref 135–147)
T3 SERPL-MCNC: 0.9 NG/ML (ref 0.9–1.8)
T4 FREE SERPL-MCNC: 0.61 NG/DL (ref 0.61–1.12)
T4 SERPL-MCNC: 6.67 UG/DL (ref 6.09–12.23)
TRIGL SERPL-MCNC: 92 MG/DL (ref ?–150)
WBC # BLD AUTO: 9.4 THOUSAND/UL (ref 4.31–10.16)

## 2025-07-10 PROCEDURE — G0103 PSA SCREENING: HCPCS

## 2025-07-10 PROCEDURE — 83036 HEMOGLOBIN GLYCOSYLATED A1C: CPT

## 2025-07-10 PROCEDURE — 80053 COMPREHEN METABOLIC PANEL: CPT

## 2025-07-10 PROCEDURE — 82306 VITAMIN D 25 HYDROXY: CPT

## 2025-07-10 PROCEDURE — 36415 COLL VENOUS BLD VENIPUNCTURE: CPT

## 2025-07-10 PROCEDURE — 82550 ASSAY OF CK (CPK): CPT

## 2025-07-10 PROCEDURE — 84480 ASSAY TRIIODOTHYRONINE (T3): CPT

## 2025-07-10 PROCEDURE — 84439 ASSAY OF FREE THYROXINE: CPT

## 2025-07-10 PROCEDURE — 85025 COMPLETE CBC W/AUTO DIFF WBC: CPT

## 2025-07-10 PROCEDURE — 84436 ASSAY OF TOTAL THYROXINE: CPT

## 2025-07-10 PROCEDURE — 84479 ASSAY OF THYROID (T3 OR T4): CPT

## 2025-07-11 ENCOUNTER — TELEPHONE (OUTPATIENT)
Age: 75
End: 2025-07-11

## 2025-07-11 LAB
EST. AVERAGE GLUCOSE BLD GHB EST-MCNC: 186 MG/DL
HBA1C MFR BLD: 8.1 %
T3RU NFR SERPL: 28 % (ref 24–39)

## 2025-07-11 NOTE — TELEPHONE ENCOUNTER
Lucius from [employer] called for the office note from visit 2/19/25 to be updated to show the pts currently use of CGM to be faxed to them at [phone].

## 2025-07-14 DIAGNOSIS — G89.29 OTHER CHRONIC PAIN: ICD-10-CM

## 2025-07-14 RX ORDER — TRAMADOL HYDROCHLORIDE 50 MG/1
50 TABLET ORAL DAILY PRN
Qty: 30 TABLET | Refills: 0 | Status: SHIPPED | OUTPATIENT
Start: 2025-07-14

## 2025-07-14 NOTE — TELEPHONE ENCOUNTER
Reason for call:   [x] Refill   [] Prior Auth  [] Other:     Office:   [x] PCP/Provider -Carolynn Foster MD   [] Specialty/Provider -     Medication: traMADol (ULTRAM) 50 mg tablet /  Take 1 tablet (50 mg total) by mouth daily as needed for moderate pain     Pharmacy: [facility]/pharmacy #[id number] - [place], [place] - [facility] [address] [facility]   Does the patient have enough for 3 days?   [] Yes   [x] No - Send as HP to POD

## 2025-07-16 NOTE — TELEPHONE ENCOUNTER
Patient called to say that he spoke to [employer] and they are getting the notes but they are not signed and dated. Patient is asking if this can be corrected and refaxed. Thank you.

## 2025-07-17 NOTE — TELEPHONE ENCOUNTER
Faxed signed note to Wanda at Loc[facility].    Called patient's wife and advised.

## 2025-07-17 NOTE — TELEPHONE ENCOUNTER
The patient wife called to inform the company still not received the documents and the patient CGM  just stop working few minutes ago.     We notify the patient's wife the provider just sign the documents and the company will be received shortly.     Please fax the form

## 2025-07-18 NOTE — TELEPHONE ENCOUNTER
"Lucius from [employer] called for the office note from visit 2/19/25 to be updated to show the pts currently use of CGM to be faxed to them at [phone].     [employer] stated they need it signed and the word \"addend\" on it by doctor's signature. I asked multiple times what was needed and [employer]ius kept saying it has to say addend on papers.  Thank you    [phone]    "

## 2025-07-21 ENCOUNTER — RA CDI HCC (OUTPATIENT)
Dept: OTHER | Facility: HOSPITAL | Age: 75
End: 2025-07-21

## 2025-07-22 LAB
LEFT EYE DIABETIC RETINOPATHY: POSITIVE
RIGHT EYE DIABETIC RETINOPATHY: POSITIVE

## 2025-07-23 ENCOUNTER — OFFICE VISIT (OUTPATIENT)
Dept: FAMILY MEDICINE CLINIC | Facility: CLINIC | Age: 75
End: 2025-07-23
Payer: MEDICARE

## 2025-07-23 VITALS
WEIGHT: 277 LBS | OXYGEN SATURATION: 97 % | SYSTOLIC BLOOD PRESSURE: 100 MMHG | HEART RATE: 94 BPM | TEMPERATURE: 97 F | DIASTOLIC BLOOD PRESSURE: 64 MMHG | BODY MASS INDEX: 37.52 KG/M2 | RESPIRATION RATE: 16 BRPM | HEIGHT: 72 IN

## 2025-07-23 DIAGNOSIS — M65.30 TRIGGER FINGER OF LEFT HAND, UNSPECIFIED FINGER: ICD-10-CM

## 2025-07-23 DIAGNOSIS — Z01.818 PREOP EXAMINATION: Primary | ICD-10-CM

## 2025-07-23 DIAGNOSIS — Z79.4 TYPE 2 DIABETES MELLITUS WITHOUT COMPLICATION, WITH LONG-TERM CURRENT USE OF INSULIN (HCC): ICD-10-CM

## 2025-07-23 DIAGNOSIS — R97.20 ELEVATED PSA: ICD-10-CM

## 2025-07-23 DIAGNOSIS — I10 PRIMARY HYPERTENSION: ICD-10-CM

## 2025-07-23 DIAGNOSIS — E11.9 TYPE 2 DIABETES MELLITUS WITHOUT COMPLICATION, WITH LONG-TERM CURRENT USE OF INSULIN (HCC): ICD-10-CM

## 2025-07-23 DIAGNOSIS — Z12.5 SCREENING PSA (PROSTATE SPECIFIC ANTIGEN): ICD-10-CM

## 2025-07-23 PROCEDURE — G2211 COMPLEX E/M VISIT ADD ON: HCPCS | Performed by: FAMILY MEDICINE

## 2025-07-23 PROCEDURE — 99214 OFFICE O/P EST MOD 30 MIN: CPT | Performed by: FAMILY MEDICINE

## 2025-07-23 NOTE — PROGRESS NOTES
Pre-operative Clearance  Name: Donovan Burnette      : 1950      MRN: [id number]  Encounter Provider: Carolynn Foster MD  Encounter Date: 2025   Encounter department: [facility]    :  Assessment & Plan  Preop examination  HOLD fast acting insulin night before  HOlD Metformin the night before  HOLD 1 week of Vitamins        Type 2 diabetes mellitus without complication, with long-term current use of insulin ([facility])    Lab Results   Component Value Date    HGBA1C 8.1 (H) 07/10/2025   Currently improving continue to monitor glucose         Primary hypertension         Trigger finger of left hand, unspecified finger    Orders:  •  Ambulatory Referral to Orthopedic Surgery; Future    Elevated PSA  Recommend repeat in 6 months.  Recommend urology establishment given increased urination  Orders:  •  Ambulatory Referral to Urology; Future  •  PSA, Total Screen; Future    Screening PSA (prostate specific antigen)    Orders:  •  PSA, Total Screen; Future      Assessment & Plan        Pre-operative Clearance:     Clearance:  Patient is medically optimized (CLEARED) for proposed surgery without any additional cardiac testing.      Medication Instructions:   - ACE Inhibitors or ARBs: Continue this medication up to the evening before surgery/procedure, but do not take the morning of the day of surgery.  - Antiepileptic meds: Continue to take this medication on your normal schedule.  - Diuretics: Continue taking this medication up to the evening before surgery/procedure, but do not take in the morning of the day of surgery/procedure.  - Hyperlipidemia meds: Continue to take this medication on your normal schedule.  - Opioids: Continue to take this medication on your normal schedule.  - Sleep meds (zolpidem, zaleplon, eszopiclone): Continue taking medication up to the evening before procedure/surgery, but do not take this medication on the morning of procedure/surgery.      Medicine  Instructions for Adults with Diabetes (NO Bowel Prep)    Follow these instructions when a BOWEL PREP is NOT required for your procedure or surgery!    NOTE:  GLP Agonists taken weekly: do not take in the 7 days before your procedure. **Bariatric surgery: do not take 4 weeks prior to your procedure.    SGLT-2 Inhibitors: do not take in the 4 days before your procedure    On the Day Before Surgery/Procedure  If you are having a procedure (e.g., Colonoscopy) or surgery which DOES NOT require a bowel prep, follow the directions below based on the type of medicine you take for your diabetes.  Type of Medicine You Take Examples What to Do   Pre-Mixed Insulin Intermediate  Oxdxzoa70/25, Fflvzgz26/30, Novolog 70/30, Regular Insulin Take 1/2 your regular dose the evening before our procedure.   Rapid/Fast Acting  Insulin and/or Long-Acting Insulin Humalog U200, NovoLog, Apidra,  Lantus, Levemir, Tresiba, Toujeo,  Fias, Basaglar Take your FULL regular dose the day before procedure.   Oral Diabetic Medicines (sulfonylurea) Glipizide/Glimepiride/  Glucotrol Take your regular dose with dinner the evening before your procedure.   Other Oral Diabetic Medicines Metformin, Glucophage, Glucophage  XR, Riomet, Glumetza, Actose,  Avandia, Gl set, Prandin Take your regular dose with dinner the evening before your procedure   GLP Agonists Adlyxin, Byetta, Bydureon,  Ozempic, Soliqua, Tanzeum,  Trulicity, Victoza, Saxenda,  Rybelsus, Wegovy, Mounjaro, Zepbound If taken daily, take as normal  If taken weekly, do not take this medicine for 7 days before your procedure including the day of the procedure (resume taking after the procedure). **Bariatric surgery: do not take 4 weeks prior to procedure   SGLT-2 Inhibitors Jardiance, Invokana, Farxiga, Steglatro, Brenzavvy, Qtern, Segluromet Glyxambi, Synjardy, Synjardy XR, Invokamet, InvokametXR, Trijary XR, Xigduo X Do not take for 4 days before your procedure including the day of the procedure  "(resume taking after the procedure)   This educational material has been approved by the Patient Education Advisory Committee.    On the Day of Surgery/Procedure  Follow the directions below based on the type of medicine you take for your diabetes.  Type of Medicine You Take  Examples What to Do   Long-Acting Insulin Lantus, Levemir, Tresiba,  Toujeo, Basaglar, Semglee If you normally take your Long Acting Insulin in the morning, take the full dose as scheduled.   GLP-I Agonists Adlyxin, Byetta, Bydureon,  Ozempic, Soliqua, Tanzeum,  Trulicity, Victoza, Saxenda,  Rybelsus, Mounjaro Do NOT take this medicine on the day of your procedure (resume taking after the procedure)   Except for the morning Long-Acting Insulin, DO NOT take ANY diabetic medicine on the day of your procedure unless you were instructed by the doctor who manages your diabetes medicines.  Continue to check your blood sugars.  If you have an insulin pump, ask your endocrinologist for instructions at least 3 days before your procedure. NOTE: If you are not able to ask your endocrinologist in advance, on the day of the procedure set your insulin pump to your basal rate only. Bring your insulin pump supplies to the hospital.         History of Present Illness     Pre-Op Examination     Surgery: Catarct   Anticipated Date of Surgery: 8/1;8/15/25   Surgeon: Dr. Rashid Marquez     Both shoulder surgery-> Anesthesia cause \" stopped breathing\" CPR was not performed.  Patient states that he will make the anesthesiologist aware but they are not using general anesthesia for the cataract surgery.  Patient states that has been having trigger fingers of the left hand and wants that addressed.  Denies any other acute concerns     Previous history of bleeding disorders or clots?: No    Previous Anesthesia reaction?: Yes    Prolonged steroid use in the last 6 months?: No      Pre-operative Risk Factors:    - History of cerebrovascular disease: No    - History of " ischemic heart disease: No    - History of congestive heart failure: No    - Pre-operative treatment with insulin: Yes    - Pre-operative creatinine >2 mg/dL: No      Review of Systems   Constitutional:  Negative for activity change, appetite change, chills, fatigue and fever.   HENT:  Negative for congestion.    Respiratory:  Negative for cough, chest tightness and shortness of breath.    Cardiovascular:  Negative for chest pain and leg swelling.   Gastrointestinal:  Negative for abdominal distention, abdominal pain, constipation, diarrhea, nausea and vomiting.   Musculoskeletal:  Positive for arthralgias.   All other systems reviewed and are negative.    Past Medical History   Past Medical History[1]  Past Surgical History[1]  Family History[1]  Social History[1]  Medications[1]  No Known Allergies  Objective   /64 (BP Location: Right arm, Patient Position: Sitting, Cuff Size: Large)   Pulse 94   Temp (!) 97 °F (36.1 °C) (Temporal)   Resp 16   Ht 6' (1.829 m)   Wt 126 kg (277 lb)   SpO2 97%   BMI 37.57 kg/m²     Physical Exam  Vitals reviewed.   Constitutional:       Appearance: He is well-developed.   HENT:      Head: Normocephalic and atraumatic.      Right Ear: Tympanic membrane, ear canal and external ear normal.      Left Ear: Tympanic membrane, ear canal and external ear normal.      Nose: Nose normal.      Mouth/Throat:      Mouth: Mucous membranes are moist.     Eyes:      Conjunctiva/sclera: Conjunctivae normal.      Pupils: Pupils are equal, round, and reactive to light.       Cardiovascular:      Rate and Rhythm: Normal rate and regular rhythm.      Heart sounds: Normal heart sounds.   Pulmonary:      Effort: Pulmonary effort is normal.      Breath sounds: Normal breath sounds. No wheezing.   Abdominal:      General: Bowel sounds are normal. There is no distension.      Palpations: Abdomen is soft. There is no mass.      Tenderness: There is no abdominal tenderness.     Musculoskeletal:          General: No tenderness. Normal range of motion.      Cervical back: Normal range of motion and neck supple.      Comments: Trigger finger on the 2nd, 3rd and 4th digit of the left hand     Skin:     General: Skin is warm.      Capillary Refill: Capillary refill takes less than 2 seconds.     Neurological:      Mental Status: He is alert and oriented to person, place, and time.      Cranial Nerves: No cranial nerve deficit.      Sensory: No sensory deficit.      Motor: No abnormal muscle tone.      Coordination: Coordination normal.      Deep Tendon Reflexes: Reflexes normal.     Psychiatric:         Behavior: Behavior normal.         Thought Content: Thought content normal.         Judgment: Judgment normal.           Carolynn Foster MD         [1]  Past Medical History:  Diagnosis Date   • Arthritis    • Diabetes mellitus (HCC)    • Flu    • Hypertension    • Kidney stone    • Osteoarthritis    [1]  Past Surgical History:  Procedure Laterality Date   • CHOLECYSTECTOMY     • HERNIA REPAIR      had it done twice.   • SHOULDER SURGERY Bilateral    • TONSILLECTOMY     [1]  Family History  Problem Relation Name Age of Onset   • Cancer Mother Ainsley    • No Known Problems Sister     • Diabetes type II Brother candi    • Other (amputation) Brother candi    • Diabetes type II Maternal Aunt Mary    • No Known Problems Son     • No Known Problems Son     • No Known Problems Son     • No Known Problems Daughter     • No Known Problems Daughter     [1]  Social History  Tobacco Use   • Smoking status: Former     Current packs/day: 0.00     Average packs/day: 1 pack/day for 15.0 years (15.0 ttl pk-yrs)     Types: Cigarettes     Start date: 1970     Quit date: 1985     Years since quittin.5   • Smokeless tobacco: Never   Vaping Use   • Vaping status: Never Used   Substance and Sexual Activity   • Alcohol use: Yes     Alcohol/week: 2.0 standard drinks of alcohol     Types: 1 Glasses of wine, 1 Cans of  beer per week     Comment: occasional   • Drug use: Never   • Sexual activity: Not Currently     Partners: Female     Birth control/protection: None   [1]  Current Outpatient Medications on File Prior to Visit   Medication Sig   • Admelog SoloStar 100 units/mL injection pen Inject 18-20 units with meals there times a day and per sliding scale insulin. Maximum 70 units per day   • atorvastatin (LIPITOR) 80 mg tablet Take 40 mg by mouth in the morning.   • BD Pen Needle Bernice 2nd Gen 32G X 4 MM MISC Use 5 times a day to inject insulin   • cholecalciferol (VITAMIN D3) 400 units tablet Take 400 Units by mouth in the morning.   • Continuous Glucose Sensor (Dexcom G7 Sensor) Use 1 Device every 10 days   • gabapentin (NEURONTIN) 300 mg capsule Take 600 mg by mouth in the morning and 600 mg in the evening.   • hydrochlorothiazide (HYDRODIURIL) 25 mg tablet Take 25 mg by mouth in the morning.   • Lantus SoloStar 100 units/mL SOPN Inject Lantus 40 units subcutaneously in the morning, 28 units int he evening   • metFORMIN (GLUCOPHAGE) 1000 MG tablet Take 1 tablet (1,000 mg total) by mouth 2 (two) times a day with meals   • traMADol (ULTRAM) 50 mg tablet Take 1 tablet (50 mg total) by mouth daily as needed for moderate pain   • valsartan (DIOVAN) 320 MG tablet Take 320 mg by mouth in the morning.   • zolpidem (AMBIEN CR) 12.5 MG CR tablet Take 1 tablet (12.5 mg total) by mouth daily at bedtime as needed for sleep   • ketoconazole (NIZORAL) 2 % cream Apply topically daily   • [DISCONTINUED] naproxen (NAPROSYN) 500 mg tablet take 1 tablet by mouth twice a day with meals   • [DISCONTINUED] rOPINIRole (REQUIP) 0.25 mg tablet take 1 tablet by mouth at bedtime (Patient not taking: Reported on 3/11/2025)

## 2025-07-23 NOTE — PROGRESS NOTES
Name: Donovan Burnette      : 1950      MRN: [id number]  Encounter Provider: Carolynn Foster MD  Encounter Date: 2025   Encounter department: [facility]  :  Assessment & Plan  Preop examination         Type 2 diabetes mellitus without complication, with long-term current use of insulin ([facility])    Lab Results   Component Value Date    HGBA1C 8.1 (H) 07/10/2025            Primary hypertension            Preventive health issues were discussed with patient, and age appropriate screening tests were ordered as noted in patient's After Visit Summary. Personalized health advice and appropriate referrals for health education or preventive services given if needed, as noted in patient's After Visit Summary.    History of Present Illness   {?Quick Links Encounters * My Last Note * Last Note in Specialty * Snapshot * Since Last Visit * History :54080}  Pre-op Exam    Pre-operative Risk Factors:    History of cerebrovascular disease: No    History of ischemic heart disease: No  Pre-operative treatment with insulin: Yes  Pre-operative creatinine >2 mg/dL: No    History of congestive heart failure: No     Patient Care Team:  Carolynn Foster MD as PCP - General (Family Medicine)  Jasmin Aguilar RD as Diabetes Educator (Diabetes Services)  Meagan Keen MD (Endocrinology)    Review of Systems  Medical History Reviewed by provider this encounter:  Problems       Annual Wellness Visit Questionnaire   Donovan is here for his Subsequent Wellness visit.     Health Risk Assessment:   Patient rates overall health as good. Patient feels that their physical health rating is same. Patient is satisfied with their life. Eyesight was rated as same. Hearing was rated as same. Patient feels that their emotional and mental health rating is same. Patients states they are never, rarely angry. Patient states they are sometimes unusually tired/fatigued. Pain experienced in the last 7 days has  been a lot. Patient's pain rating has been 7/10. Patient states that he has experienced no weight loss or gain in last 6 months.     Fall Risk Screening:   In the past year, patient has experienced: no history of falling in past year      Home Safety:  Patient does not have trouble with stairs inside or outside of their home. Patient has working smoke alarms and has working carbon monoxide detector. Home safety hazards include: none.     Nutrition:   Current diet is Diabetic and Low Cholesterol.     Medications:   Patient is currently taking over-the-counter supplements. OTC medications include: see medication list. Patient is able to manage medications.     Activities of Daily Living (ADLs)/Instrumental Activities of Daily Living (IADLs):   Walk and transfer into and out of bed and chair?: Yes  Dress and groom yourself?: Yes    Bathe or shower yourself?: Yes    Feed yourself? Yes  Do your laundry/housekeeping?: Yes  Manage your money, pay your bills and track your expenses?: Yes  Make your own meals?: Yes    Do your own shopping?: Yes    Durable Medical Equipment Suppliers  [facility]    Previous Hospitalizations:   Any hospitalizations or ED visits within the last 12 months?: No      Advance Care Planning:   Living will: No    Durable POA for healthcare: No    Advanced directive: No      Preventive Screenings      Cardiovascular Screening:    General: Screening Not Indicated and History Lipid Disorder      Diabetes Screening:     General: Screening Not Indicated and History Diabetes      Colorectal Cancer Screening:     General: Screening Current      Prostate Cancer Screening:    General: Screening Current      Abdominal Aortic Aneurysm (AAA) Screening:    Risk factors include: age between 65-76 yo and tobacco use        Lung Cancer Screening:     General: Screening Not Indicated    Immunizations:  - Immunizations due: Prevnar 20 and Zoster (Shingrix)    Screening, Brief Intervention, and Referral to  Treatment (SBIRT)     Screening  Typical number of drinks in a day: 0  Typical number of drinks in a week: 0  Interpretation: Low risk drinking behavior.    AUDIT-C Screenin) How often did you have a drink containing alcohol in the past year? never  2) How many drinks did you have on a typical day when you were drinking in the past year? 0  3) How often did you have 6 or more drinks on one occasion in the past year? never    AUDIT-C Score: 0  Interpretation: Score 0-3 (male): Negative screen for alcohol misuse    Single Item Drug Screening:  How often have you used an illegal drug (including marijuana) or a prescription medication for non-medical reasons in the past year? never    Single Item Drug Screen Score: 0  Interpretation: Negative screen for possible drug use disorder    Social Drivers of Health     Food Insecurity: No Food Insecurity (2025)    Nursing - Inadequate Food Risk Classification    • Worried About Running Out of Food in the Last Year: Never true    • Ran Out of Food in the Last Year: Never true   Transportation Needs: No Transportation Needs (2025)    PRAPARE - Transportation    • Lack of Transportation (Medical): No    • Lack of Transportation (Non-Medical): No   Housing Stability: Low Risk  (2025)    Housing Stability Vital Sign    • Unable to Pay for Housing in the Last Year: No    • Number of Times Moved in the Last Year: 0    • Homeless in the Last Year: No   Utilities: Not At Risk (2025)    [facility] Utilities    • Threatened with loss of utilities: No     No results found.    Objective {?Quick Links Trend Vitals * Enter New Vitals * Results Review * Timeline (Adult) * Labs * Imaging * Cardiology * Procedures * Lung Cancer Screening * Surgical eConsent :50512}  /64 (BP Location: Right arm, Patient Position: Sitting, Cuff Size: Large)   Pulse 94   Temp (!) 97 °F (36.1 °C) (Temporal)   Resp 16   Ht 6' (1.829 m)   Wt 126 kg (277 lb)   SpO2 97%   BMI 37.57 kg/m²      Physical Exam  {Administrative / Billing Section (Optional):20298}

## 2025-07-24 ENCOUNTER — TELEPHONE (OUTPATIENT)
Dept: ADMINISTRATIVE | Facility: OTHER | Age: 75
End: 2025-07-24

## 2025-07-28 DIAGNOSIS — M65.332 TRIGGER FINGER, LEFT MIDDLE FINGER: Primary | ICD-10-CM

## 2025-07-28 DIAGNOSIS — M65.30 TRIGGER FINGER OF LEFT HAND, UNSPECIFIED FINGER: ICD-10-CM

## 2025-07-28 PROCEDURE — 20550 NJX 1 TENDON SHEATH/LIGAMENT: CPT | Performed by: STUDENT IN AN ORGANIZED HEALTH CARE EDUCATION/TRAINING PROGRAM

## 2025-07-28 PROCEDURE — 99213 OFFICE O/P EST LOW 20 MIN: CPT | Performed by: STUDENT IN AN ORGANIZED HEALTH CARE EDUCATION/TRAINING PROGRAM

## 2025-07-28 RX ORDER — LIDOCAINE HYDROCHLORIDE 10 MG/ML
1 INJECTION, SOLUTION INFILTRATION; PERINEURAL
Status: COMPLETED | OUTPATIENT
Start: 2025-07-28 | End: 2025-07-28

## 2025-07-28 RX ORDER — BETAMETHASONE SODIUM PHOSPHATE AND BETAMETHASONE ACETATE 3; 3 MG/ML; MG/ML
6 INJECTION, SUSPENSION INTRA-ARTICULAR; INTRALESIONAL; INTRAMUSCULAR; SOFT TISSUE
Status: COMPLETED | OUTPATIENT
Start: 2025-07-28 | End: 2025-07-28

## 2025-07-28 RX ADMIN — BETAMETHASONE SODIUM PHOSPHATE AND BETAMETHASONE ACETATE 6 MG: 3; 3 INJECTION, SUSPENSION INTRA-ARTICULAR; INTRALESIONAL; INTRAMUSCULAR; SOFT TISSUE at 12:00

## 2025-07-28 RX ADMIN — LIDOCAINE HYDROCHLORIDE 1 ML: 10 INJECTION, SOLUTION INFILTRATION; PERINEURAL at 12:00

## 2025-07-29 ENCOUNTER — TELEPHONE (OUTPATIENT)
Age: 75
End: 2025-07-29

## 2025-07-29 ENCOUNTER — PROCEDURE VISIT (OUTPATIENT)
Age: 75
End: 2025-07-29
Payer: MEDICARE

## 2025-07-29 VITALS — WEIGHT: 277 LBS | BODY MASS INDEX: 37.52 KG/M2 | HEIGHT: 72 IN | RESPIRATION RATE: 17 BRPM

## 2025-07-29 DIAGNOSIS — M77.42 METATARSALGIA OF BOTH FEET: Primary | ICD-10-CM

## 2025-07-29 DIAGNOSIS — M21.962 ACQUIRED DEFORMITY OF BOTH FEET: ICD-10-CM

## 2025-07-29 DIAGNOSIS — M77.41 METATARSALGIA OF BOTH FEET: Primary | ICD-10-CM

## 2025-07-29 DIAGNOSIS — B35.9 DERMATOPHYTOSIS: ICD-10-CM

## 2025-07-29 DIAGNOSIS — E11.42 DIABETIC POLYNEUROPATHY ASSOCIATED WITH TYPE 2 DIABETES MELLITUS (HCC): ICD-10-CM

## 2025-07-29 DIAGNOSIS — M21.961 ACQUIRED DEFORMITY OF BOTH FEET: ICD-10-CM

## 2025-07-29 DIAGNOSIS — M79.672 PAIN IN BOTH FEET: ICD-10-CM

## 2025-07-29 DIAGNOSIS — M79.671 PAIN IN BOTH FEET: ICD-10-CM

## 2025-07-29 DIAGNOSIS — B35.1 ONYCHOMYCOSIS: ICD-10-CM

## 2025-07-29 PROCEDURE — 99213 OFFICE O/P EST LOW 20 MIN: CPT | Performed by: PODIATRIST

## 2025-07-29 RX ORDER — KETOCONAZOLE 20 MG/G
CREAM TOPICAL DAILY
Qty: 60 G | Refills: 6 | Status: SHIPPED | OUTPATIENT
Start: 2025-07-29 | End: 2025-08-28

## 2025-08-07 ENCOUNTER — OFFICE VISIT (OUTPATIENT)
Dept: AUDIOLOGY | Facility: CLINIC | Age: 75
End: 2025-08-07

## 2025-08-07 ENCOUNTER — OFFICE VISIT (OUTPATIENT)
Dept: AUDIOLOGY | Facility: CLINIC | Age: 75
End: 2025-08-07
Attending: FAMILY MEDICINE
Payer: MEDICARE

## 2025-08-07 DIAGNOSIS — H90.3 SENSORINEURAL HEARING LOSS, BILATERAL: Primary | ICD-10-CM

## 2025-08-07 DIAGNOSIS — H91.93 HEARING DECREASED, BILATERAL: ICD-10-CM

## 2025-08-07 PROCEDURE — 92557 COMPREHENSIVE HEARING TEST: CPT | Performed by: AUDIOLOGIST

## 2025-08-07 PROCEDURE — 92567 TYMPANOMETRY: CPT | Performed by: AUDIOLOGIST

## 2025-08-13 ENCOUNTER — TELEPHONE (OUTPATIENT)
Age: 75
End: 2025-08-13

## 2025-08-13 ENCOUNTER — TELEPHONE (OUTPATIENT)
Dept: OBGYN CLINIC | Facility: CLINIC | Age: 75
End: 2025-08-13

## 2025-08-19 ENCOUNTER — TELEPHONE (OUTPATIENT)
Age: 75
End: 2025-08-19